# Patient Record
Sex: FEMALE | Race: WHITE | Employment: OTHER | URBAN - METROPOLITAN AREA
[De-identification: names, ages, dates, MRNs, and addresses within clinical notes are randomized per-mention and may not be internally consistent; named-entity substitution may affect disease eponyms.]

---

## 2020-09-13 ENCOUNTER — HOSPITAL ENCOUNTER (EMERGENCY)
Age: 70
Discharge: HOME | End: 2020-09-13
Attending: EMERGENCY MEDICINE
Payer: MEDICARE

## 2020-09-13 VITALS
HEART RATE: 79 BPM | WEIGHT: 115.3 LBS | HEIGHT: 69 IN | TEMPERATURE: 98.4 F | SYSTOLIC BLOOD PRESSURE: 126 MMHG | DIASTOLIC BLOOD PRESSURE: 92 MMHG | BODY MASS INDEX: 17.08 KG/M2 | OXYGEN SATURATION: 98 % | RESPIRATION RATE: 14 BRPM

## 2020-09-13 PROCEDURE — 96372 THER/PROPH/DIAG INJ SC/IM: CPT

## 2020-09-13 PROCEDURE — 6360000002 HC RX W HCPCS: Performed by: EMERGENCY MEDICINE

## 2020-09-13 PROCEDURE — 99282 EMERGENCY DEPT VISIT SF MDM: CPT

## 2020-09-13 RX ORDER — ASPIRIN 81 MG/1
81 TABLET, CHEWABLE ORAL DAILY
Status: ON HOLD | COMMUNITY
End: 2022-11-02 | Stop reason: SDUPTHER

## 2020-09-13 RX ORDER — SIMVASTATIN 40 MG
40 TABLET ORAL EVERY MORNING
Status: ON HOLD | COMMUNITY

## 2020-09-13 RX ORDER — METHYLPREDNISOLONE SODIUM SUCCINATE 125 MG/2ML
125 INJECTION, POWDER, LYOPHILIZED, FOR SOLUTION INTRAMUSCULAR; INTRAVENOUS ONCE
Status: COMPLETED | OUTPATIENT
Start: 2020-09-13 | End: 2020-09-13

## 2020-09-13 RX ORDER — ZOLPIDEM TARTRATE 5 MG/1
5 TABLET ORAL NIGHTLY PRN
COMMUNITY
End: 2022-10-31

## 2020-09-13 RX ORDER — LORAZEPAM 2 MG/1
2 TABLET ORAL EVERY 6 HOURS PRN
Status: ON HOLD | COMMUNITY

## 2020-09-13 RX ORDER — METHYLPREDNISOLONE 4 MG/1
TABLET ORAL
Qty: 1 KIT | Refills: 0 | Status: SHIPPED | OUTPATIENT
Start: 2020-09-13 | End: 2020-11-09

## 2020-09-13 RX ADMIN — METHYLPREDNISOLONE SODIUM SUCCINATE 125 MG: 125 INJECTION, POWDER, FOR SOLUTION INTRAMUSCULAR; INTRAVENOUS at 10:24

## 2020-09-13 SDOH — HEALTH STABILITY: MENTAL HEALTH: HOW OFTEN DO YOU HAVE A DRINK CONTAINING ALCOHOL?: NEVER

## 2020-09-13 ASSESSMENT — PAIN - FUNCTIONAL ASSESSMENT
PAIN_FUNCTIONAL_ASSESSMENT: PREVENTS OR INTERFERES WITH MANY ACTIVE NOT PASSIVE ACTIVITIES
PAIN_FUNCTIONAL_ASSESSMENT: 0-10

## 2020-09-13 ASSESSMENT — PAIN DESCRIPTION - PAIN TYPE: TYPE: CHRONIC PAIN

## 2020-09-13 ASSESSMENT — PAIN SCALES - GENERAL
PAINLEVEL_OUTOF10: 7
PAINLEVEL_OUTOF10: 8

## 2020-09-13 ASSESSMENT — PAIN DESCRIPTION - LOCATION: LOCATION: BACK

## 2020-09-13 ASSESSMENT — PAIN DESCRIPTION - FREQUENCY: FREQUENCY: CONTINUOUS

## 2020-09-13 ASSESSMENT — PAIN DESCRIPTION - ORIENTATION: ORIENTATION: LOWER;LEFT

## 2020-09-13 ASSESSMENT — PAIN DESCRIPTION - DESCRIPTORS: DESCRIPTORS: ACHING;CONSTANT

## 2020-09-13 NOTE — ED NOTES
Discharge and education instructions reviewed. Patient verbalized understanding, teach back successful. Patient denied questions at this time. Instructed to follow up with PCP and or return to ED if symptoms worsen.    Ambulatory to exit with a limp, daughter driving pain is subsiding 7/10     David Aggarwal RN  09/13/20 4386

## 2020-09-13 NOTE — ED PROVIDER NOTES
eMERGENCY dEPARTMENT eNCOUnter      Pt Name: Porfirio Jones  MRN: [id number]  [place] 1950  Date of evaluation: 9/13/2020  Provider: Mortimer Snow, MD     [address] [place] [address]       Chief Complaint   Patient presents with    Back Pain     h/o chronic back pain, with several discs surgeries. Now diana left low back pain radiating to left hip and leg, no known injury, denies urinary sx. onset last Friday         HISTORY OF PRESENT ILLNESS   (Location/Symptom, Timing/Onset,Context/Setting, Quality, Duration, Modifying Factors, Severity) Note limiting factors. HPI    Porfirio Jones is a 68 y.o. female who presents to the emergency department with low back pain for about 3 days. There was no injury. Patient states she has chronic back pain and multiple surgery for several discs. Patient states that pain is going down her back and down her legs. Denies any urinary complaints. It has been going on for 3 days getting worse. Patient requesting a steroid injection. Patient does not have a family doctor at this time. Has been no fever. Patient is ambulatory and drove herself here. Nursing Notes were reviewed. REVIEW OFSYSTEMS    (2+ for level 4; 10+ for level 5)   Review of Systems    General: No fevers, chills or night sweats, No weight loss    Head:  No Sore throat,  No Ear Pain    Chest:  Nontender. No Cough, No SOB,  Chest Pain    GI: No abdominal pain or vomiting    : No dysuria or hematuria    Musculoskeletal: No unrelenting pain or night pain    Neurologic: No bowel or bladder incontinence, No saddle anesthesia, No leg weakness    All other systems reviewed and are negative.         PAST MEDICAL HISTORY     Past Medical History:   Diagnosis Date    Arthritis     Hyperlipidemia        SURGICAL HISTORY       Past Surgical History:   Procedure Laterality Date    BACK SURGERY      x 5 disc surgeries    TUBAL LIGATION         CURRENT MEDICATIONS       Previous Medications    ASPIRIN 81 MG CHEWABLE TABLET    Take 81 mg by mouth daily    LORAZEPAM (ATIVAN) 2 MG TABLET    Take 2 mg by mouth every 6 hours as needed for Anxiety. SIMVASTATIN (ZOCOR) 40 MG TABLET    Take 40 mg by mouth nightly    ZOLPIDEM (AMBIEN) 5 MG TABLET    Take 5 mg by mouth nightly as needed for Sleep. ALLERGIES     Patient has no known allergies. FAMILY HISTORY     History reviewed. No pertinent family history. SOCIAL HISTORY       Social History     Socioeconomic History    Marital status:      Spouse name: None    Number of children: None    Years of education: None    Highest education level: None   Occupational History    None   Social Needs    Financial resource strain: None    Food insecurity     Worry: None     Inability: None    Transportation needs     Medical: None     Non-medical: None   Tobacco Use    Smoking status: Current Some Day Smoker     Packs/day: 0.50     Types: Cigarettes    Smokeless tobacco: Never Used   Substance and Sexual Activity    Alcohol use: Never     Frequency: Never    Drug use: Never    Sexual activity: None   Lifestyle    Physical activity     Days per week: None     Minutes per session: None    Stress: None   Relationships    Social connections     Talks on phone: None     Gets together: None     Attends Confucianism service: None     Active member of club or organization: None     Attends meetings of clubs or organizations: None     Relationship status: None    Intimate partner violence     Fear of current or ex partner: None     Emotionally abused: None     Physically abused: None     Forced sexual activity: None   Other Topics Concern    None   Social History Narrative    None       SCREENINGS           PHYSICAL EXAM    (up to 7 for level 4, 8 or more for level 5)     ED Triage Vitals   BP Temp Temp src Pulse Resp SpO2 Height Weight   -- -- -- -- -- -- -- --       Physical Exam    General: Alert and awake ×3. Nontoxic appearance.   Well-developed well-nourished elderly 68-year-old some kyphosis in no acute distress  HEENT: Normocephalic atraumatic. Neck is supple. Airway intact. No adenopathy  Cardiac: Regular rate and rhythm with no murmurs rubs or gallops  Pulmonary: Lungs are clear in all lung fields. No wheezing. No Rales. Abdomen: Soft and nontender. Negative hepatosplenomegaly. Bowel sounds are active  Extremities: Moving all extremities. No calf tenderness. Peripheral pulses all intact. No back tenderness. Midline surgical scar noted. .  Lumbar tenderness. Skin: No skin lesions. No rashes  Neurologic: Cranial nerves II through XII was grossly intact. Nonfocal neurological exam  Psychiatric: Patient is pleasant. Mood is appropriate. DIAGNOSTIC RESULTS     EKG (Per Emergency Physician):       RADIOLOGY (Per Emergency Physician): Interpretation per the Radiologist below, if available at the time of this note:  No results found. ED BEDSIDE ULTRASOUND:   Performed by ED Physician - none    LABS:  Labs Reviewed - No data to display     All other labs were within normal range or not returned as of this dictation. Procedures      EMERGENCY DEPARTMENT COURSE and DIFFERENTIAL DIAGNOSIS/MDM:   Vitals:    Vitals:    09/13/20 1003   BP: (!) 126/92   Pulse: 79   Resp: 14   Temp: 98.4 °F (36.9 °C)   TempSrc: Oral   SpO2: 98%   Weight: 115 lb 4.8 oz (52.3 kg)   Height: 5' 9\" (1.753 m)       Medications   methylPREDNISolone sodium (SOLU-MEDROL) injection 125 mg (has no administration in time range)       MDM. Patient was given Solu-Medrol 125 mg IM. Recommend Medrol Dosepak at home. Referral to a family physician at [facility]. Patient recommend bed rest continue her other medication discharged in good condition. REVAL:         CRITICAL CARE TIME   Total CriticalCare time was 0 minutes, excluding separately reportable procedures.   There was a high probability of clinically significant/life threatening deterioration in the patient's condition which required my urgent intervention. CONSULTS:  None    PROCEDURES:  Unless otherwise noted below, none     [unfilled]    FINAL IMPRESSION      1. Acute exacerbation of chronic low back pain          DISPOSITION/PLAN   DISPOSITION        PATIENT REFERRED TO:  [facility]) Pre-Services  [phone]          DISCHARGE MEDICATIONS:  New Prescriptions    METHYLPREDNISOLONE (MEDROL, MARLENI,) 4 MG TABLET    Take by mouth. (Please note:  Portions of this note were completed with a voice recognition program.Efforts were made to edit the dictations but occasionally words and phrases are mis-transcribed.)  Form v2016. J.5-cn    EMILY HAM MD (electronically signed)  Emergency Medicine Provider        Isacc Mesa MD  09/13/20 1126

## 2020-11-09 ENCOUNTER — HOSPITAL ENCOUNTER (EMERGENCY)
Age: 70
Discharge: HOME | End: 2020-11-09
Attending: EMERGENCY MEDICINE
Payer: MEDICARE

## 2020-11-09 VITALS
OXYGEN SATURATION: 98 % | HEART RATE: 75 BPM | TEMPERATURE: 97.7 F | SYSTOLIC BLOOD PRESSURE: 139 MMHG | HEIGHT: 70 IN | DIASTOLIC BLOOD PRESSURE: 97 MMHG | WEIGHT: 117.5 LBS | BODY MASS INDEX: 16.82 KG/M2 | RESPIRATION RATE: 16 BRPM

## 2020-11-09 PROCEDURE — 99283 EMERGENCY DEPT VISIT LOW MDM: CPT

## 2020-11-09 RX ORDER — METHOCARBAMOL 500 MG/1
500 TABLET, FILM COATED ORAL 2 TIMES DAILY
Qty: 40 TABLET | Refills: 0 | Status: SHIPPED | OUTPATIENT
Start: 2020-11-09 | End: 2020-11-19

## 2020-11-09 ASSESSMENT — PAIN SCALES - GENERAL: PAINLEVEL_OUTOF10: 0

## 2020-11-09 NOTE — ED PROVIDER NOTES
CHIEF COMPLAINT  Chief Complaint   Patient presents with    Medication Refill     pt. here for medication refills on Lorazepam 2mg every 6 hours, Ambien 5mg at night, Robaxin 500mg BID       HISTORY OF PRESENT ILLNESS  Zahra Vasquez is a 65 y.o. female who presents to the ED complaining of having her primary care physician retired and not having anyone to refill her chronic medications of Ativan, Ambien and Robaxin. Patient is not experiencing any current withdrawal symptoms but has complaints of chronic low back pain and restless leg syndrome. She is not having any symptoms of acute exacerbation. No other complaints, modifying factors or associated symptoms. Nursing notes reviewed. Past Medical History:   Diagnosis Date    Arthritis     Chronic back pain     Hyperlipidemia     Restless leg syndrome      Past Surgical History:   Procedure Laterality Date    BACK SURGERY      x 5 disc surgeries    TUBAL LIGATION       History reviewed. No pertinent family history.   Social History     Socioeconomic History    Marital status:      Spouse name: Not on file    Number of children: Not on file    Years of education: Not on file    Highest education level: Not on file   Occupational History    Not on file   Social Needs    Financial resource strain: Not on file    Food insecurity     Worry: Not on file     Inability: Not on file    Transportation needs     Medical: Not on file     Non-medical: Not on file   Tobacco Use    Smoking status: Current Some Day Smoker     Packs/day: 0.50     Types: Cigarettes    Smokeless tobacco: Never Used   Substance and Sexual Activity    Alcohol use: Never     Frequency: Never    Drug use: Never    Sexual activity: Not Currently   Lifestyle    Physical activity     Days per week: Not on file     Minutes per session: Not on file    Stress: Not on file   Relationships    Social connections     Talks on phone: Not on file     Gets together: Not on file     Attends Pentecostalism service: Not on file     Active member of club or organization: Not on file     Attends meetings of clubs or organizations: Not on file     Relationship status: Not on file    Intimate partner violence     Fear of current or ex partner: Not on file     Emotionally abused: Not on file     Physically abused: Not on file     Forced sexual activity: Not on file   Other Topics Concern    Not on file   Social History Narrative    Not on file     No current facility-administered medications for this encounter. Current Outpatient Medications   Medication Sig Dispense Refill    methocarbamol (ROBAXIN) 500 MG tablet Take 1 tablet by mouth 2 times daily for 10 days Use as needed for muscle pain or soreness. May take 2 at bedtime to aid sleep. 40 tablet 0    simvastatin (ZOCOR) 40 MG tablet Take 40 mg by mouth nightly      aspirin 81 MG chewable tablet Take 81 mg by mouth daily      LORazepam (ATIVAN) 2 MG tablet Take 2 mg by mouth every 6 hours as needed for Anxiety.  zolpidem (AMBIEN) 5 MG tablet Take 5 mg by mouth nightly as needed for Sleep. No Known Allergies    REVIEW OF SYSTEMS  Positives and pertinent negatives as per HPI. Six other systems were reviewed and are negative. Nursing notes pertaining to ROS were reviewed. PHYSICAL EXAM   BP (!) 139/97   Pulse 75   Temp 97.7 °F (36.5 °C) (Oral)   Resp 16   Ht 5' 10\" (1.778 m)   Wt 117 lb 8.1 oz (53.3 kg)   SpO2 98%   BMI 16.86 kg/m²   GENERAL APPEARANCE: Awake and alert. Cooperative. No acute distress. HEAD: Normocephalic. Atraumatic. EYES: PERRL. EOM's grossly intact. No scleral icterus, injection or exudate  ENT: Mucous membranes are moist.  NECK: Supple. Normal ROM. CHEST:  Regular rate and rhythm, no murmurs, rubs or gallops  LUNGS: Breathing is unlabored. Speaking comfortably in full sentences. Clear through auscultation bilaterally  ABDOMEN: Nondistended, nontender  EXTREMITIES: MAEE.  No acute deformities. SKIN: Warm and dry. NEUROLOGICAL: Alert and oriented. ED COURSE/MDM  Encounter for medication refill: Patient was counseled that her Ativan and Ambien could not be refilled as they are controlled substances that need to be managed in supervised by a single primary care physician. Patient is in the process of establishing with Dr. Mary Lorenzo but has not seen her yet and patient will be referred to our urgent PCP referral center. Patient will have her Robaxin refilled at this time. Patient is not currently having any symptoms of acute exacerbation of symptoms or withdrawal.    Hypertension:  Patient was hypertensive during the ER visit today. There are no signs of hypertensive emergency or evidence of end organ damage by history or physical exam.  These findings were discussed with the patient and advised to follow up with primary care physician to further assess and treat hypertension in the outpatient setting. The patient's blood pressure was found to be elevated according to CMS/Medicare and the Affordable Care Act/ObamaCare criteria. Elevated blood pressure could occur because of pain or anxiety or other reasons and does not mean that they need to have their blood pressure treated or medications otherwise adjusted. However, this could also be a sign that they will need to have their blood pressure treated or medications changed. The patient was instructed to take a list of recent blood pressure readings to their next visit with their personal physician. Patient was given scripts for the following medications. I counseled patient how to take these medications. New Prescriptions    METHOCARBAMOL (ROBAXIN) 500 MG TABLET    Take 1 tablet by mouth 2 times daily for 10 days Use as needed for muscle pain or soreness. May take 2 at bedtime to aid sleep. CLINICAL IMPRESSION  1.  Encounter for medication refill        Blood pressure (!) 139/97, pulse 75, temperature 97.7 °F (36.5 °C), temperature source Oral, resp. rate 16, height 5' 10\" (1.778 m), weight 117 lb 8.1 oz (53.3 kg), SpO2 98 %.       Follow-up with:  [facility]              Ivan Montoya MD  11/09/20 9136

## 2021-08-17 ENCOUNTER — HOSPITAL ENCOUNTER (OUTPATIENT)
Age: 71
Discharge: HOME | End: 2021-08-17
Payer: MEDICARE

## 2021-08-17 ENCOUNTER — HOSPITAL ENCOUNTER (OUTPATIENT)
Dept: GENERAL RADIOLOGY | Age: 71
Discharge: HOME | End: 2021-08-17
Payer: MEDICARE

## 2021-08-17 DIAGNOSIS — R03.0 ELEVATED BLOOD PRESSURE READING WITHOUT DIAGNOSIS OF HYPERTENSION: ICD-10-CM

## 2021-08-17 DIAGNOSIS — Z87.891 PERSONAL HISTORY OF NICOTINE DEPENDENCE: ICD-10-CM

## 2021-08-17 PROCEDURE — 71046 X-RAY EXAM CHEST 2 VIEWS: CPT

## 2022-10-31 ENCOUNTER — APPOINTMENT (OUTPATIENT)
Dept: GENERAL RADIOLOGY | Age: 72
DRG: 522 | End: 2022-10-31
Payer: MEDICARE

## 2022-10-31 ENCOUNTER — APPOINTMENT (OUTPATIENT)
Dept: CT IMAGING | Age: 72
DRG: 522 | End: 2022-10-31
Payer: MEDICARE

## 2022-10-31 ENCOUNTER — HOSPITAL ENCOUNTER (INPATIENT)
Age: 72
LOS: 2 days | Discharge: NURSING, REHAB OR LONG-TERM CARE | DRG: 522 | End: 2022-11-02
Attending: INTERNAL MEDICINE | Admitting: INTERNAL MEDICINE
Payer: MEDICARE

## 2022-10-31 DIAGNOSIS — S72.001A CLOSED RIGHT HIP FRACTURE, INITIAL ENCOUNTER (HCC): ICD-10-CM
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
## 2022-10-31 DIAGNOSIS — S72.001A CLOSED FRACTURE OF RIGHT HIP, INITIAL ENCOUNTER (HCC): Primary | ICD-10-CM

## 2022-10-31 LAB
A/G RATIO: 1.5 (ref 1.1–2.2)
ABO/RH: NORMAL
ALBUMIN SERPL-MCNC: 3.9 G/DL (ref 3.4–5)
ALP BLD-CCNC: 114 U/L (ref 40–129)
ALT SERPL-CCNC: 21 U/L (ref 10–40)
ANION GAP SERPL CALCULATED.3IONS-SCNC: 12 MMOL/L (ref 3–16)
ANTIBODY SCREEN: NORMAL
AST SERPL-CCNC: 40 U/L (ref 15–37)
BASOPHILS ABSOLUTE: 0 K/UL (ref 0–0.2)
BASOPHILS RELATIVE PERCENT: 0.3 %
BILIRUB SERPL-MCNC: 0.9 MG/DL (ref 0–1)
BUN BLDV-MCNC: 13 MG/DL (ref 7–20)
CALCIUM SERPL-MCNC: 8.8 MG/DL (ref 8.3–10.6)
CHLORIDE BLD-SCNC: 100 MMOL/L (ref 99–110)
CO2: 28 MMOL/L (ref 21–32)
CREAT SERPL-MCNC: 0.5 MG/DL (ref 0.6–1.2)
EOSINOPHILS ABSOLUTE: 0 K/UL (ref 0–0.6)
EOSINOPHILS RELATIVE PERCENT: 0 %
GFR SERPL CREATININE-BSD FRML MDRD: >60 ML/MIN/{1.73_M2}
GLUCOSE BLD-MCNC: 139 MG/DL (ref 70–99)
HCT VFR BLD CALC: 40.5 % (ref 36–48)
HEMOGLOBIN: 13.5 G/DL (ref 12–16)
INR BLD: 1.12 (ref 0.87–1.14)
LYMPHOCYTES ABSOLUTE: 0.5 K/UL (ref 1–5.1)
LYMPHOCYTES RELATIVE PERCENT: 4.7 %
MCH RBC QN AUTO: 32.2 PG (ref 26–34)
MCHC RBC AUTO-ENTMCNC: 33.3 G/DL (ref 31–36)
MCV RBC AUTO: 96.8 FL (ref 80–100)
MONOCYTES ABSOLUTE: 0.9 K/UL (ref 0–1.3)
MONOCYTES RELATIVE PERCENT: 7.9 %
NEUTROPHILS ABSOLUTE: 10 K/UL (ref 1.7–7.7)
NEUTROPHILS RELATIVE PERCENT: 87.1 %
PDW BLD-RTO: 13.6 % (ref 12.4–15.4)
PLATELET # BLD: 189 K/UL (ref 135–450)
PMV BLD AUTO: 9 FL (ref 5–10.5)
POTASSIUM SERPL-SCNC: 3.9 MMOL/L (ref 3.5–5.1)
PROTHROMBIN TIME: 14.3 SEC (ref 11.7–14.5)
RBC # BLD: 4.19 M/UL (ref 4–5.2)
SODIUM BLD-SCNC: 140 MMOL/L (ref 136–145)
TOTAL PROTEIN: 6.5 G/DL (ref 6.4–8.2)
WBC # BLD: 11.5 K/UL (ref 4–11)

## 2022-10-31 PROCEDURE — 86901 BLOOD TYPING SEROLOGIC RH(D): CPT

## 2022-10-31 PROCEDURE — 96375 TX/PRO/DX INJ NEW DRUG ADDON: CPT

## 2022-10-31 PROCEDURE — 86900 BLOOD TYPING SEROLOGIC ABO: CPT

## 2022-10-31 PROCEDURE — 96374 THER/PROPH/DIAG INJ IV PUSH: CPT

## 2022-10-31 PROCEDURE — 85025 COMPLETE CBC W/AUTO DIFF WBC: CPT

## 2022-10-31 PROCEDURE — 51702 INSERT TEMP BLADDER CATH: CPT

## 2022-10-31 PROCEDURE — 86850 RBC ANTIBODY SCREEN: CPT

## 2022-10-31 PROCEDURE — 73502 X-RAY EXAM HIP UNI 2-3 VIEWS: CPT

## 2022-10-31 PROCEDURE — 6360000002 HC RX W HCPCS: Performed by: PHYSICIAN ASSISTANT

## 2022-10-31 PROCEDURE — 2580000003 HC RX 258: Performed by: INTERNAL MEDICINE

## 2022-10-31 PROCEDURE — 80053 COMPREHEN METABOLIC PANEL: CPT

## 2022-10-31 PROCEDURE — 99285 EMERGENCY DEPT VISIT HI MDM: CPT

## 2022-10-31 PROCEDURE — 71045 X-RAY EXAM CHEST 1 VIEW: CPT

## 2022-10-31 PROCEDURE — 6360000002 HC RX W HCPCS: Performed by: INTERNAL MEDICINE

## 2022-10-31 PROCEDURE — 70450 CT HEAD/BRAIN W/O DYE: CPT

## 2022-10-31 PROCEDURE — 6370000000 HC RX 637 (ALT 250 FOR IP): Performed by: INTERNAL MEDICINE

## 2022-10-31 PROCEDURE — 99221 1ST HOSP IP/OBS SF/LOW 40: CPT | Performed by: NURSE PRACTITIONER

## 2022-10-31 PROCEDURE — 1200000000 HC SEMI PRIVATE

## 2022-10-31 PROCEDURE — 85610 PROTHROMBIN TIME: CPT

## 2022-10-31 RX ORDER — ATORVASTATIN CALCIUM 10 MG/1
10 TABLET, FILM COATED ORAL DAILY
Status: DISCONTINUED | OUTPATIENT
Start: 2022-11-01 | End: 2022-11-02 | Stop reason: HOSPADM

## 2022-10-31 RX ORDER — LORAZEPAM 1 MG/1
2 TABLET ORAL EVERY 6 HOURS PRN
Status: DISCONTINUED | OUTPATIENT
Start: 2022-10-31 | End: 2022-11-02 | Stop reason: HOSPADM

## 2022-10-31 RX ORDER — SODIUM CHLORIDE 9 MG/ML
INJECTION, SOLUTION INTRAVENOUS PRN
Status: DISCONTINUED | OUTPATIENT
Start: 2022-10-31 | End: 2022-11-02 | Stop reason: HOSPADM

## 2022-10-31 RX ORDER — POLYETHYLENE GLYCOL 3350 17 G/17G
17 POWDER, FOR SOLUTION ORAL DAILY PRN
Status: DISCONTINUED | OUTPATIENT
Start: 2022-10-31 | End: 2022-11-02 | Stop reason: HOSPADM

## 2022-10-31 RX ORDER — ONDANSETRON 4 MG/1
4 TABLET, ORALLY DISINTEGRATING ORAL EVERY 8 HOURS PRN
Status: DISCONTINUED | OUTPATIENT
Start: 2022-10-31 | End: 2022-11-02 | Stop reason: HOSPADM

## 2022-10-31 RX ORDER — ENOXAPARIN SODIUM 100 MG/ML
40 INJECTION SUBCUTANEOUS DAILY
Status: DISCONTINUED | OUTPATIENT
Start: 2022-11-01 | End: 2022-11-01

## 2022-10-31 RX ORDER — SODIUM CHLORIDE 0.9 % (FLUSH) 0.9 %
5-40 SYRINGE (ML) INJECTION PRN
Status: DISCONTINUED | OUTPATIENT
Start: 2022-10-31 | End: 2022-11-02 | Stop reason: HOSPADM

## 2022-10-31 RX ORDER — ONDANSETRON 2 MG/ML
4 INJECTION INTRAMUSCULAR; INTRAVENOUS EVERY 6 HOURS PRN
Status: DISCONTINUED | OUTPATIENT
Start: 2022-10-31 | End: 2022-11-02 | Stop reason: HOSPADM

## 2022-10-31 RX ORDER — SODIUM CHLORIDE 0.9 % (FLUSH) 0.9 %
5-40 SYRINGE (ML) INJECTION EVERY 12 HOURS SCHEDULED
Status: DISCONTINUED | OUTPATIENT
Start: 2022-10-31 | End: 2022-11-02 | Stop reason: HOSPADM

## 2022-10-31 RX ORDER — ASPIRIN 81 MG/1
81 TABLET, CHEWABLE ORAL DAILY
Status: DISCONTINUED | OUTPATIENT
Start: 2022-11-01 | End: 2022-11-02 | Stop reason: DRUGHIGH

## 2022-10-31 RX ORDER — SODIUM CHLORIDE, SODIUM LACTATE, POTASSIUM CHLORIDE, CALCIUM CHLORIDE 600; 310; 30; 20 MG/100ML; MG/100ML; MG/100ML; MG/100ML
INJECTION, SOLUTION INTRAVENOUS CONTINUOUS
Status: DISCONTINUED | OUTPATIENT
Start: 2022-10-31 | End: 2022-11-02 | Stop reason: HOSPADM

## 2022-10-31 RX ORDER — MORPHINE SULFATE 4 MG/ML
4 INJECTION, SOLUTION INTRAMUSCULAR; INTRAVENOUS ONCE
Status: COMPLETED | OUTPATIENT
Start: 2022-10-31 | End: 2022-10-31

## 2022-10-31 RX ORDER — ONDANSETRON 2 MG/ML
4 INJECTION INTRAMUSCULAR; INTRAVENOUS ONCE
Status: COMPLETED | OUTPATIENT
Start: 2022-10-31 | End: 2022-10-31

## 2022-10-31 RX ORDER — METHOCARBAMOL 500 MG/1
500 TABLET, FILM COATED ORAL 2 TIMES DAILY
Status: ON HOLD | COMMUNITY
End: 2022-11-08 | Stop reason: HOSPADM

## 2022-10-31 RX ADMIN — SODIUM CHLORIDE, POTASSIUM CHLORIDE, SODIUM LACTATE AND CALCIUM CHLORIDE: 600; 310; 30; 20 INJECTION, SOLUTION INTRAVENOUS at 21:45

## 2022-10-31 RX ADMIN — HYDROMORPHONE HYDROCHLORIDE 0.25 MG: 1 INJECTION, SOLUTION INTRAMUSCULAR; INTRAVENOUS; SUBCUTANEOUS at 14:21

## 2022-10-31 RX ADMIN — LORAZEPAM 2 MG: 1 TABLET ORAL at 18:53

## 2022-10-31 RX ADMIN — ONDANSETRON 4 MG: 2 INJECTION INTRAMUSCULAR; INTRAVENOUS at 13:02

## 2022-10-31 RX ADMIN — HYDROMORPHONE HYDROCHLORIDE 0.5 MG: 1 INJECTION, SOLUTION INTRAMUSCULAR; INTRAVENOUS; SUBCUTANEOUS at 17:01

## 2022-10-31 RX ADMIN — HYDROMORPHONE HYDROCHLORIDE 0.5 MG: 1 INJECTION, SOLUTION INTRAMUSCULAR; INTRAVENOUS; SUBCUTANEOUS at 21:40

## 2022-10-31 RX ADMIN — SODIUM CHLORIDE, PRESERVATIVE FREE 10 ML: 5 INJECTION INTRAVENOUS at 21:45

## 2022-10-31 RX ADMIN — MORPHINE SULFATE 4 MG: 4 INJECTION, SOLUTION INTRAMUSCULAR; INTRAVENOUS at 13:02

## 2022-10-31 ASSESSMENT — PAIN - FUNCTIONAL ASSESSMENT
PAIN_FUNCTIONAL_ASSESSMENT: PREVENTS OR INTERFERES WITH ALL ACTIVE AND SOME PASSIVE ACTIVITIES
PAIN_FUNCTIONAL_ASSESSMENT: 0-10
PAIN_FUNCTIONAL_ASSESSMENT: PREVENTS OR INTERFERES WITH ALL ACTIVE AND SOME PASSIVE ACTIVITIES

## 2022-10-31 ASSESSMENT — PAIN SCALES - GENERAL
PAINLEVEL_OUTOF10: 7
PAINLEVEL_OUTOF10: 7
PAINLEVEL_OUTOF10: 10
PAINLEVEL_OUTOF10: 7
PAINLEVEL_OUTOF10: 6

## 2022-10-31 ASSESSMENT — PAIN DESCRIPTION - ORIENTATION
ORIENTATION: RIGHT

## 2022-10-31 ASSESSMENT — PAIN DESCRIPTION - LOCATION
LOCATION: HIP

## 2022-10-31 ASSESSMENT — PAIN DESCRIPTION - DESCRIPTORS
DESCRIPTORS: ACHING
DESCRIPTORS: DISCOMFORT;ACHING

## 2022-10-31 ASSESSMENT — PAIN SCALES - WONG BAKER: WONGBAKER_NUMERICALRESPONSE: 2

## 2022-10-31 ASSESSMENT — ENCOUNTER SYMPTOMS
SHORTNESS OF BREATH: 0
VOMITING: 0
ABDOMINAL PAIN: 0

## 2022-10-31 ASSESSMENT — PAIN DESCRIPTION - PAIN TYPE
TYPE: ACUTE PAIN
TYPE: ACUTE PAIN

## 2022-10-31 NOTE — ED PROVIDER NOTES
[facility]      Pt Name: Sasha Rodríguez  MRN: [id number]  [place] 1950  Date of evaluation: 10/31/2022  Provider: RU Suarez    This patient was not seen and evaluated by the attending physician No att. providers found. CHIEF COMPLAINT       Chief Complaint   Patient presents with    Fall     Patient fell yesterday walking from bathroom. She admits to a few shots of vodka but denies being drunk. Family assisted patient to bed. Woke with continued right hip pain and unable to get up. Rotation is present. Received 100mcg of fentanyl and 4mg zofran both IV by ems. CRITICAL CARE TIME   I performed a total Critical Care time of 31 minutes, excluding separately reportable procedures. There was a high probability of clinically significant/life threatening deterioration in the patient's condition which required my urgent intervention. Not limited to multiple reexaminations, discussions with attending physician and consultants. HISTORY OF PRESENT ILLNESS  (Location/Symptom, Timing/Onset, Context/Setting, Quality, Duration, Modifying Factors, Severity.)   Sasha Rodríguez is a 74 y.o. female who presents to the emergency department after a fall. Patient presents via EMS from home. She tells me that she had some vodka drink last night but that she slipped in the bathroom and went down. Denies loss of consciousness or hitting her head. She had some right hip pain but her son-in-law \"picked her up and got her in bed. \"  She has not been able to get out of bed. She states she is urinated on herself and has not eaten anything since yesterday. Pain in hip is 7 out of 10 despite getting 100 MCG of fentanyl from EMS. She takes a baby aspirin no other blood thinners. History of chronic back pain hyperlipidemia and restless leg syndrome. She takes Ativan nightly. Nursing Notes were reviewed and I agree.     REVIEW OF SYSTEMS    (2-9 systems for level 4, 10 or more for level 5)     Review of Systems   Constitutional:  Negative for fever. Respiratory:  Negative for shortness of breath. Cardiovascular:  Negative for chest pain. Gastrointestinal:  Negative for abdominal pain and vomiting. Musculoskeletal:  Positive for arthralgias and gait problem. Skin:  Negative for wound. Neurological:  Negative for weakness and numbness. Psychiatric/Behavioral:  Negative for agitation, behavioral problems and confusion. Except as noted above the remainder of the review of systems was reviewed and negative. PAST MEDICAL HISTORY         Diagnosis Date    Arthritis     Chronic back pain     Hyperlipidemia     Restless leg syndrome        SURGICAL HISTORY           Procedure Laterality Date    BACK SURGERY      x 5 disc surgeries    TUBAL LIGATION         CURRENT MEDICATIONS       Previous Medications    ASPIRIN 81 MG CHEWABLE TABLET    Take 81 mg by mouth daily    LORAZEPAM (ATIVAN) 2 MG TABLET    Take 2 mg by mouth every 6 hours as needed for Anxiety. SIMVASTATIN (ZOCOR) 40 MG TABLET    Take 40 mg by mouth nightly    ZOLPIDEM (AMBIEN) 5 MG TABLET    Take 5 mg by mouth nightly as needed for Sleep. ALLERGIES     Patient has no known allergies. FAMILY HISTORY     History reviewed. No pertinent family history. No family status information on file. SOCIAL HISTORY      reports that she has been smoking cigarettes. She has been smoking an average of .5 packs per day. She has never used smokeless tobacco. She reports current alcohol use. She reports that she does not use drugs. PHYSICAL EXAM    (up to 7 for level 4, 8 or more for level 5)     ED Triage Vitals [10/31/22 1135]   BP Temp Temp Source Heart Rate Resp SpO2 Height Weight   (!) 150/88 98 °F (36.7 °C) Oral 88 16 94 % -- 138 lb 3.7 oz (62.7 kg)       Physical Exam  Vitals and nursing note reviewed.    Constitutional:       Appearance: Normal appearance. HENT:      Head: Normocephalic and atraumatic. Mouth/Throat:      Mouth: Mucous membranes are moist.   Eyes:      Pupils: Pupils are equal, round, and reactive to light. Cardiovascular:      Rate and Rhythm: Normal rate. Pulses: Normal pulses. Pulmonary:      Effort: Pulmonary effort is normal. No respiratory distress. Abdominal:      Tenderness: There is no abdominal tenderness. Musculoskeletal:      Cervical back: Normal range of motion. Right hip: Deformity and bony tenderness present. Decreased range of motion. Decreased strength. Skin:     General: Skin is warm. Neurological:      General: No focal deficit present. Mental Status: She is alert and oriented to person, place, and time. Psychiatric:         Mood and Affect: Mood normal.         Behavior: Behavior normal.       DIAGNOSTIC RESULTS     RADIOLOGY:   Non-plain film images such as CT, Ultrasound and MRI are read by the radiologist. Plain radiographic images are visualized and preliminarily interpreted by RU Garcia with the below findings:    Reviewed radiologist's interpretation. Interpretation per the Radiologist below, if available at the time of this note:    XR CHEST 1 VIEW   Final Result   No acute cardiopulmonary findings         XR HIP 2-3 VW W PELVIS RIGHT   Final Result   Angulated and displaced right femoral neck fracture         CT HEAD WO CONTRAST   Preliminary Result   No evidence of acute intracranial abnormality.                LABS:  Labs Reviewed   CBC WITH AUTO DIFFERENTIAL - Abnormal; Notable for the following components:       Result Value    WBC 11.5 (*)     Neutrophils Absolute 10.0 (*)     Lymphocytes Absolute 0.5 (*)     All other components within normal limits   COMPREHENSIVE METABOLIC PANEL - Abnormal; Notable for the following components:    Glucose 139 (*)     Creatinine 0.5 (*)     AST 40 (*)     All other components within normal limits   PROTIME-INR   URINALYSIS WITH REFLEX TO CULTURE   TYPE AND SCREEN       All other labs were within normal range or not returned as of this dictation. EMERGENCY DEPARTMENT COURSE and DIFFERENTIAL DIAGNOSIS/MDM:   Vitals:    Vitals:    10/31/22 1135   BP: (!) 150/88   Pulse: 88   Resp: 16   Temp: 98 °F (36.7 °C)   TempSrc: Oral   SpO2: 94%   Weight: 138 lb 3.7 oz (62.7 kg)     Afebrile not tachycardic not hypoxic. Has a right hip fracture. Orthopedics reviewed the case. Recommended surgery tomorrow afternoon. Pulses are intact in the foot. Evaluated by the hospitalist.  Will admit. CONSULTS:  [address] [place] [address] CONSULT TO HOSPITALIST    PROCEDURES:  Procedures      FINAL IMPRESSION      1. Closed fracture of right hip, initial encounter ([facility] [address]    2. Fall from slip, trip, or stumble, initial encounter          DISPOSITION/PLAN   DISPOSITION Decision To Admit 10/31/2022 01:10:14 PM      PATIENT REFERRED TO:  No follow-up provider specified.     DISCHARGE MEDICATIONS:  New Prescriptions    No medications on file       (Please note that portions of this note were completed with a voice recognition program.  Efforts were made to edit the dictations but occasionally words are mis-transcribed.)    Lucrecia Obando [place]  10/31/22 1125

## 2022-10-31 NOTE — PROGRESS NOTES
Medication Reconciliation    List of medications patient is currently taking is complete. Source of information: 1. Conversation with patient                                       2. EPIC records                                       3. Conversation with Tae Michael     Allergies  Patient has no known allergies.      Urszula Donohue [facility], PharmD, BCPS  10/31/2022 2:01 PM

## 2022-10-31 NOTE — H&P
smoking cigarettes. She has been smoking an average of .5 packs per day. She has never used smokeless tobacco.  ETOH:   reports current alcohol use. E-cigarette/Vaping       Questions Responses    E-cigarette/Vaping Use     Start Date     Passive Exposure     Quit Date     Counseling Given     Comments               Family History:       Reviewed and negative in regards to presenting illness/complaint. History reviewed. No pertinent family history. REVIEW OF SYSTEMS COMPLETED:   Pertinent positives as noted in the HPI. All other systems reviewed and negative. PHYSICAL EXAM PERFORMED:    BP (!) 150/88   Pulse 88   Temp 98 °F (36.7 °C) (Oral)   Resp 16   Wt 138 lb 3.7 oz (62.7 kg)   SpO2 94%   BMI 19.83 kg/m²     General appearance:  No apparent distress, appears stated age and cooperative. HEENT:  Normal cephalic, atraumatic without obvious deformity. Pupils equal, round, and reactive to light. Extra ocular muscles intact. Conjunctivae/corneas clear. Neck: Supple, with full range of motion. No jugular venous distention. Trachea midline. Respiratory:  Normal respiratory effort. Clear to auscultation, bilaterally without Rales/Wheezes/Rhonchi. Cardiovascular:  Regular rate and rhythm with normal S1/S2 without murmurs, rubs or gallops. Abdomen: Soft, non-tender, non-distended with normal bowel sounds. Musculoskeletal:  No clubbing, cyanosis or edema bilaterally. Full range of motion without deformity. Skin: Skin color, texture, turgor normal.  No rashes or lesions. Neurologic:  Neurovascularly intact without any focal sensory/motor deficits.  Cranial nerves: II-XII intact, grossly non-focal.  Psychiatric:  Alert and oriented, thought content appropriate, normal insight  Capillary Refill: Brisk,3 seconds, normal  Peripheral Pulses: +2 palpable, equal bilaterally       Labs:     Recent Labs     10/31/22  1202   WBC 11.5*   HGB 13.5   HCT 40.5        Recent Labs     10/31/22  1202    K 3.9      CO2 28   BUN 13   CREATININE 0.5*   CALCIUM 8.8     Recent Labs     10/31/22  1202   AST 40*   ALT 21   BILITOT 0.9   ALKPHOS 114     Recent Labs     10/31/22  1202   INR 1.12     No results for input(s): Carlee Del Rio in the last 72 hours. Urinalysis:    No results found for: Vicki Passer, [address], BACTERIA, RBCUA, BLOODU, [address], [place] [address]    Radiology:         XR CHEST 1 VIEW   Final Result   No acute cardiopulmonary findings         XR HIP 2-3 VW W PELVIS RIGHT   Final Result   Angulated and displaced right femoral neck fracture         CT HEAD WO CONTRAST   Preliminary Result   No evidence of acute intracranial abnormality. Consults:    IP CONSULT TO ORTHOPEDIC SURGERY  IP CONSULT TO HOSPITALIST    ASSESSMENT:    Active Hospital Problems    Diagnosis Date Noted    Closed fracture of right hip ([facility] [address] [S72.001A] 10/31/2022     Priority: Medium    Closed right hip fracture, initial encounter ([facility] [address] [S72.001A] 10/31/2022     Priority: Medium         PLAN:    Angulated right neck femoral fracture  Okay to proceed with surgery; revised cardiac index 1, class II  N.p.o. after midnight  Dilaudid as needed  Orthopedic surgery notified from ED, plan for surgery tomorrow in a.m. Back pain  Chronic benzodiazepine use  Patient takes lorazepam 2 mg every 6 hours, which is concerning due to high incidence of falls and adverse reactions     DVT Prophylaxis: +  Diet: ADULT DIET; Regular  Code Status: No Order    PT/OT Eval Status: +    Dispo - admit       Mitra Sloan MD    Thank you Brady Green MD for the opportunity to be involved in this patient's care. If you have any questions or concerns please feel free to contact me at [phone].

## 2022-10-31 NOTE — ED NOTES
Patient cleaned from urine and stool. purewick placed until urinary catheter can be inserted.       Shay Maloney RN  10/31/22 [id number]

## 2022-10-31 NOTE — CONSULTS
[facility] Orthopedic Surgery  Consult Note    This patient is seen in consultation at the request of A Chantale VENCES    Reason for Consult:  right hip pain    CHIEF COMPLAINT:  right hip pain    History Obtained From:  patient, electronic medical record    HISTORY OF PRESENT ILLNESS:    The patient is a 74 y.o. female who presents with right hip pain after a fall in  her home yesterday. She allowed family to move her from the floor. Pain persisted and she is unable to walk so she came to ER today. Pain is described in right groin and with the intensity of moderate to severe. Pain is described as aching, throbbing. Discomfort is constant. Pain is worse with movement right leg and better at rest. She is alert and oriented. Appears in distress with complaint of pain and requesting pain medication. No other complaints. She states she had not eaten since yesterday    Past Medical History:        Diagnosis Date    Arthritis     Chronic back pain     Hyperlipidemia     Restless leg syndrome        Past Surgical History:        Procedure Laterality Date    BACK SURGERY      x 5 disc surgeries    TUBAL LIGATION         Social History     Tobacco Use    Smoking status: Every Day     Packs/day: 0.50     Types: Cigarettes    Smokeless tobacco: Never   Substance Use Topics    Alcohol use: Yes     Comment: occasional       History reviewed. No pertinent family history. Current Medications:   Current Facility-Administered Medications: morphine (PF) injection 4 mg, 4 mg, IntraVENous, Once  ondansetron (ZOFRAN) injection 4 mg, 4 mg, IntraVENous, Once  Allergies:  ALLERGY@    REVIEW OF SYSTEMS:    CONSTITUTIONAL:  negative for  fevers, chills, and fatigue  MUSCULOSKELETAL:  positive for  myalgias, arthralgias, and pain  All other ROS reviewed in chart or with patient or family and are grossly negative.          PHYSICAL EXAM:    VITALS:  BP (!) 150/88   Pulse 88   Temp 98 °F (36.7 °C) (Oral)   Resp 16   Wt 138 lb 3.7 oz (62.7 kg) SpO2 94%   BMI 19.83 kg/m²     MUSCULOSKELETAL:  right foot NVI. Wiggles toes to command. Able to plantarflex and dorsiflex ankle Pedal pulses are palpable. Right leg externally rotated. Tender right groin. Nontender left groin or bilateral knee or ankle. Nontender bilateral shoulder elbow and wrist. Right wrist and hand with gross bruising, no swelling. Able to flex and extend wrist and hand without pain or limitation. Small bruise base of left thumb, FROM intact without pain or limitations. NEUROLOGIC:   Sensory:    Touch:                     Right Upper Extremity:  normal                   Left Upper Extremity:  normal                  Right Lower Extremity:  normal                  Left Lower Extremity:  normal  Skin warm and dry  Resp deep and easy  Abdomen soft and round  Pulse is with regular rate and rhythm    DATA:    CBC:   Lab Results   Component Value Date/Time    WBC 11.5 10/31/2022 12:02 PM    RBC 4.19 10/31/2022 12:02 PM    HGB 13.5 10/31/2022 12:02 PM    HCT 40.5 10/31/2022 12:02 PM    MCV 96.8 10/31/2022 12:02 PM    MCH 32.2 10/31/2022 12:02 PM    MCHC 33.3 10/31/2022 12:02 PM    RDW 13.6 10/31/2022 12:02 PM     10/31/2022 12:02 PM    MPV 9.0 10/31/2022 12:02 PM     WBC:    Lab Results   Component Value Date/Time    WBC 11.5 10/31/2022 12:02 PM     PT/INR:    Lab Results   Component Value Date/Time    PROTIME 14.3 10/31/2022 12:02 PM    INR 1.12 10/31/2022 12:02 PM     PTT:  No results found for: APTT[APTT  Radiology Review:    Xray pelvis pending      IMPRESSION/RECOMMENDATIONS:  Fall at home  Right hip pain  Right hip xray pending  Will discuss with Dr Gali Herrera. Pt has not eaten today, if surgery required may be able to address today. Keep NPO for now.        Marcia Cadena, JES - JONE  10/31/2022  12:42 PM

## 2022-10-31 NOTE — PROGRESS NOTES
Updated Dr Sree Ken and pt. Plan for right hip surgery tomorrow afternoon. NPO after MN. [place] traction 5# for comfort.

## 2022-11-01 ENCOUNTER — ANESTHESIA (OUTPATIENT)
Dept: OPERATING ROOM | Age: 72
DRG: 522 | End: 2022-11-01
Payer: MEDICARE

## 2022-11-01 ENCOUNTER — APPOINTMENT (OUTPATIENT)
Dept: GENERAL RADIOLOGY | Age: 72
DRG: 522 | End: 2022-11-01
Payer: MEDICARE

## 2022-11-01 ENCOUNTER — ANESTHESIA EVENT (OUTPATIENT)
Dept: OPERATING ROOM | Age: 72
DRG: 522 | End: 2022-11-01
Payer: MEDICARE

## 2022-11-01 LAB
ANION GAP SERPL CALCULATED.3IONS-SCNC: 13 MMOL/L (ref 3–16)
BASOPHILS ABSOLUTE: 0.1 K/UL (ref 0–0.2)
BASOPHILS RELATIVE PERCENT: 0.6 %
BUN BLDV-MCNC: 19 MG/DL (ref 7–20)
CALCIUM SERPL-MCNC: 8.9 MG/DL (ref 8.3–10.6)
CHLORIDE BLD-SCNC: 101 MMOL/L (ref 99–110)
CO2: 27 MMOL/L (ref 21–32)
CREAT SERPL-MCNC: 0.7 MG/DL (ref 0.6–1.2)
EOSINOPHILS ABSOLUTE: 0.1 K/UL (ref 0–0.6)
EOSINOPHILS RELATIVE PERCENT: 0.5 %
GFR SERPL CREATININE-BSD FRML MDRD: >60 ML/MIN/{1.73_M2}
GLUCOSE BLD-MCNC: 110 MG/DL (ref 70–99)
GLUCOSE BLD-MCNC: 116 MG/DL (ref 70–99)
HCT VFR BLD CALC: 38.8 % (ref 36–48)
HEMOGLOBIN: 13.2 G/DL (ref 12–16)
LYMPHOCYTES ABSOLUTE: 1.4 K/UL (ref 1–5.1)
LYMPHOCYTES RELATIVE PERCENT: 13.6 %
MCH RBC QN AUTO: 32.3 PG (ref 26–34)
MCHC RBC AUTO-ENTMCNC: 34 G/DL (ref 31–36)
MCV RBC AUTO: 95 FL (ref 80–100)
MONOCYTES ABSOLUTE: 0.9 K/UL (ref 0–1.3)
MONOCYTES RELATIVE PERCENT: 8.6 %
NEUTROPHILS ABSOLUTE: 7.9 K/UL (ref 1.7–7.7)
NEUTROPHILS RELATIVE PERCENT: 76.7 %
PDW BLD-RTO: 13.8 % (ref 12.4–15.4)
PERFORMED ON: ABNORMAL
PLATELET # BLD: 176 K/UL (ref 135–450)
PMV BLD AUTO: 9.7 FL (ref 5–10.5)
POTASSIUM REFLEX MAGNESIUM: 4 MMOL/L (ref 3.5–5.1)
RBC # BLD: 4.09 M/UL (ref 4–5.2)
SODIUM BLD-SCNC: 141 MMOL/L (ref 136–145)
WBC # BLD: 10.4 K/UL (ref 4–11)

## 2022-11-01 PROCEDURE — 2580000003 HC RX 258: Performed by: INTERNAL MEDICINE

## 2022-11-01 PROCEDURE — 7100000000 HC PACU RECOVERY - FIRST 15 MIN: Performed by: ORTHOPAEDIC SURGERY

## 2022-11-01 PROCEDURE — C1713 ANCHOR/SCREW BN/BN,TIS/BN: HCPCS | Performed by: ORTHOPAEDIC SURGERY

## 2022-11-01 PROCEDURE — 2580000003 HC RX 258: Performed by: ORTHOPAEDIC SURGERY

## 2022-11-01 PROCEDURE — 3600000015 HC SURGERY LEVEL 5 ADDTL 15MIN: Performed by: ORTHOPAEDIC SURGERY

## 2022-11-01 PROCEDURE — 2500000003 HC RX 250 WO HCPCS: Performed by: NURSE ANESTHETIST, CERTIFIED REGISTERED

## 2022-11-01 PROCEDURE — 7100000001 HC PACU RECOVERY - ADDTL 15 MIN: Performed by: ORTHOPAEDIC SURGERY

## 2022-11-01 PROCEDURE — 6360000002 HC RX W HCPCS: Performed by: ORTHOPAEDIC SURGERY

## 2022-11-01 PROCEDURE — 2580000003 HC RX 258: Performed by: NURSE PRACTITIONER

## 2022-11-01 PROCEDURE — 80048 BASIC METABOLIC PNL TOTAL CA: CPT

## 2022-11-01 PROCEDURE — 6370000000 HC RX 637 (ALT 250 FOR IP): Performed by: ORTHOPAEDIC SURGERY

## 2022-11-01 PROCEDURE — 2709999900 HC NON-CHARGEABLE SUPPLY: Performed by: ORTHOPAEDIC SURGERY

## 2022-11-01 PROCEDURE — 73502 X-RAY EXAM HIP UNI 2-3 VIEWS: CPT | Performed by: ORTHOPAEDIC SURGERY

## 2022-11-01 PROCEDURE — 2060000000 HC ICU INTERMEDIATE R&B

## 2022-11-01 PROCEDURE — 0SR90JZ REPLACEMENT OF RIGHT HIP JOINT WITH SYNTHETIC SUBSTITUTE, OPEN APPROACH: ICD-10-PCS | Performed by: ORTHOPAEDIC SURGERY

## 2022-11-01 PROCEDURE — 94760 N-INVAS EAR/PLS OXIMETRY 1: CPT

## 2022-11-01 PROCEDURE — 73502 X-RAY EXAM HIP UNI 2-3 VIEWS: CPT

## 2022-11-01 PROCEDURE — 85025 COMPLETE CBC W/AUTO DIFF WBC: CPT

## 2022-11-01 PROCEDURE — 3209999900 FLUORO FOR SURGICAL PROCEDURES

## 2022-11-01 PROCEDURE — 3600000005 HC SURGERY LEVEL 5 BASE: Performed by: ORTHOPAEDIC SURGERY

## 2022-11-01 PROCEDURE — C1776 JOINT DEVICE (IMPLANTABLE): HCPCS | Performed by: ORTHOPAEDIC SURGERY

## 2022-11-01 PROCEDURE — 6360000002 HC RX W HCPCS: Performed by: INTERNAL MEDICINE

## 2022-11-01 PROCEDURE — 3700000001 HC ADD 15 MINUTES (ANESTHESIA): Performed by: ORTHOPAEDIC SURGERY

## 2022-11-01 PROCEDURE — 36415 COLL VENOUS BLD VENIPUNCTURE: CPT

## 2022-11-01 PROCEDURE — 6360000002 HC RX W HCPCS: Performed by: NURSE ANESTHETIST, CERTIFIED REGISTERED

## 2022-11-01 PROCEDURE — A4217 STERILE WATER/SALINE, 500 ML: HCPCS | Performed by: ORTHOPAEDIC SURGERY

## 2022-11-01 PROCEDURE — 3700000000 HC ANESTHESIA ATTENDED CARE: Performed by: ORTHOPAEDIC SURGERY

## 2022-11-01 PROCEDURE — 27130 TOTAL HIP ARTHROPLASTY: CPT | Performed by: ORTHOPAEDIC SURGERY

## 2022-11-01 PROCEDURE — 2720000010 HC SURG SUPPLY STERILE: Performed by: ORTHOPAEDIC SURGERY

## 2022-11-01 PROCEDURE — 6360000002 HC RX W HCPCS: Performed by: HOSPITALIST

## 2022-11-01 PROCEDURE — 6360000002 HC RX W HCPCS: Performed by: NURSE PRACTITIONER

## 2022-11-01 DEVICE — BONE SCREW 6.5X40 SELF-TAP: Type: IMPLANTABLE DEVICE | Site: HIP | Status: FUNCTIONAL

## 2022-11-01 DEVICE — VIVACIT-E DM BEARING 28X40MM: Type: IMPLANTABLE DEVICE | Site: HIP | Status: FUNCTIONAL

## 2022-11-01 DEVICE — AVENIR COMPLETE STANDARD COLLARED SIZE 5: Type: IMPLANTABLE DEVICE | Site: HIP | Status: FUNCTIONAL

## 2022-11-01 DEVICE — BIOLOX® DELTA, CERAMIC FEMORAL HEAD, L, Ø 28/+3.5, TAPER 12/14
Type: IMPLANTABLE DEVICE | Site: HIP | Status: FUNCTIONAL
Brand: BIOLOX® DELTA

## 2022-11-01 DEVICE — BONE SCREW 6.5X35 SELF-TAP: Type: IMPLANTABLE DEVICE | Site: HIP | Status: FUNCTIONAL

## 2022-11-01 DEVICE — G7 DUAL MOBILITY LINER 40MM D: Type: IMPLANTABLE DEVICE | Site: HIP | Status: FUNCTIONAL

## 2022-11-01 DEVICE — SHELL ACET SZ D DIA50MM 3 H OSSEOTI LIMIT H 2 MOBILITY G7: Type: IMPLANTABLE DEVICE | Site: HIP | Status: FUNCTIONAL

## 2022-11-01 RX ORDER — ONDANSETRON 2 MG/ML
INJECTION INTRAMUSCULAR; INTRAVENOUS PRN
Status: DISCONTINUED | OUTPATIENT
Start: 2022-11-01 | End: 2022-11-01 | Stop reason: SDUPTHER

## 2022-11-01 RX ORDER — SODIUM CHLORIDE 0.9 % (FLUSH) 0.9 %
5-40 SYRINGE (ML) INJECTION EVERY 12 HOURS SCHEDULED
Status: DISCONTINUED | OUTPATIENT
Start: 2022-11-01 | End: 2022-11-01 | Stop reason: HOSPADM

## 2022-11-01 RX ORDER — DEXAMETHASONE SODIUM PHOSPHATE 4 MG/ML
INJECTION, SOLUTION INTRA-ARTICULAR; INTRALESIONAL; INTRAMUSCULAR; INTRAVENOUS; SOFT TISSUE PRN
Status: DISCONTINUED | OUTPATIENT
Start: 2022-11-01 | End: 2022-11-01 | Stop reason: SDUPTHER

## 2022-11-01 RX ORDER — PROPOFOL 10 MG/ML
INJECTION, EMULSION INTRAVENOUS PRN
Status: DISCONTINUED | OUTPATIENT
Start: 2022-11-01 | End: 2022-11-01 | Stop reason: SDUPTHER

## 2022-11-01 RX ORDER — FENTANYL CITRATE 50 UG/ML
INJECTION, SOLUTION INTRAMUSCULAR; INTRAVENOUS PRN
Status: DISCONTINUED | OUTPATIENT
Start: 2022-11-01 | End: 2022-11-01 | Stop reason: SDUPTHER

## 2022-11-01 RX ORDER — ONDANSETRON 2 MG/ML
4 INJECTION INTRAMUSCULAR; INTRAVENOUS
Status: DISCONTINUED | OUTPATIENT
Start: 2022-11-01 | End: 2022-11-01 | Stop reason: HOSPADM

## 2022-11-01 RX ORDER — SODIUM CHLORIDE 9 MG/ML
INJECTION, SOLUTION INTRAVENOUS PRN
Status: DISCONTINUED | OUTPATIENT
Start: 2022-11-01 | End: 2022-11-01 | Stop reason: HOSPADM

## 2022-11-01 RX ORDER — SODIUM CHLORIDE 0.9 % (FLUSH) 0.9 %
5-40 SYRINGE (ML) INJECTION PRN
Status: DISCONTINUED | OUTPATIENT
Start: 2022-11-01 | End: 2022-11-01 | Stop reason: HOSPADM

## 2022-11-01 RX ORDER — SUCCINYLCHOLINE/SOD CL,ISO/PF 200MG/10ML
SYRINGE (ML) INTRAVENOUS PRN
Status: DISCONTINUED | OUTPATIENT
Start: 2022-11-01 | End: 2022-11-01 | Stop reason: SDUPTHER

## 2022-11-01 RX ORDER — GLYCOPYRROLATE 0.2 MG/ML
INJECTION INTRAMUSCULAR; INTRAVENOUS PRN
Status: DISCONTINUED | OUTPATIENT
Start: 2022-11-01 | End: 2022-11-01 | Stop reason: SDUPTHER

## 2022-11-01 RX ORDER — MAGNESIUM HYDROXIDE 1200 MG/15ML
LIQUID ORAL CONTINUOUS PRN
Status: DISCONTINUED | OUTPATIENT
Start: 2022-11-01 | End: 2022-11-01 | Stop reason: HOSPADM

## 2022-11-01 RX ORDER — VANCOMYCIN HYDROCHLORIDE 1 G/20ML
INJECTION, POWDER, LYOPHILIZED, FOR SOLUTION INTRAVENOUS
Status: COMPLETED | OUTPATIENT
Start: 2022-11-01 | End: 2022-11-01

## 2022-11-01 RX ORDER — LIDOCAINE HYDROCHLORIDE 20 MG/ML
INJECTION, SOLUTION EPIDURAL; INFILTRATION; INTRACAUDAL; PERINEURAL PRN
Status: DISCONTINUED | OUTPATIENT
Start: 2022-11-01 | End: 2022-11-01 | Stop reason: SDUPTHER

## 2022-11-01 RX ORDER — ROCURONIUM BROMIDE 10 MG/ML
INJECTION, SOLUTION INTRAVENOUS PRN
Status: DISCONTINUED | OUTPATIENT
Start: 2022-11-01 | End: 2022-11-01 | Stop reason: SDUPTHER

## 2022-11-01 RX ORDER — FENTANYL CITRATE 50 UG/ML
25 INJECTION, SOLUTION INTRAMUSCULAR; INTRAVENOUS EVERY 5 MIN PRN
Status: DISCONTINUED | OUTPATIENT
Start: 2022-11-01 | End: 2022-11-01 | Stop reason: HOSPADM

## 2022-11-01 RX ORDER — EPHEDRINE SULFATE/0.9% NACL/PF 50 MG/5 ML
SYRINGE (ML) INTRAVENOUS PRN
Status: DISCONTINUED | OUTPATIENT
Start: 2022-11-01 | End: 2022-11-01 | Stop reason: SDUPTHER

## 2022-11-01 RX ORDER — PHENYLEPHRINE HCL IN 0.9% NACL 1 MG/10 ML
SYRINGE (ML) INTRAVENOUS PRN
Status: DISCONTINUED | OUTPATIENT
Start: 2022-11-01 | End: 2022-11-01 | Stop reason: SDUPTHER

## 2022-11-01 RX ORDER — OXYCODONE HYDROCHLORIDE 5 MG/1
5 TABLET ORAL
Status: DISCONTINUED | OUTPATIENT
Start: 2022-11-01 | End: 2022-11-01 | Stop reason: HOSPADM

## 2022-11-01 RX ORDER — MIDAZOLAM HYDROCHLORIDE 1 MG/ML
INJECTION INTRAMUSCULAR; INTRAVENOUS PRN
Status: DISCONTINUED | OUTPATIENT
Start: 2022-11-01 | End: 2022-11-01 | Stop reason: SDUPTHER

## 2022-11-01 RX ADMIN — HYDROMORPHONE HYDROCHLORIDE 0.25 MG: 1 INJECTION, SOLUTION INTRAMUSCULAR; INTRAVENOUS; SUBCUTANEOUS at 04:01

## 2022-11-01 RX ADMIN — CEFAZOLIN 2000 MG: 2 INJECTION, POWDER, FOR SOLUTION INTRAMUSCULAR; INTRAVENOUS at 15:07

## 2022-11-01 RX ADMIN — CEFAZOLIN 2000 MG: 2 INJECTION, POWDER, FOR SOLUTION INTRAMUSCULAR; INTRAVENOUS at 20:09

## 2022-11-01 RX ADMIN — DEXAMETHASONE SODIUM PHOSPHATE 8 MG: 4 INJECTION, SOLUTION INTRAMUSCULAR; INTRAVENOUS at 15:19

## 2022-11-01 RX ADMIN — LORAZEPAM 2 MG: 1 TABLET ORAL at 20:06

## 2022-11-01 RX ADMIN — HYDROMORPHONE HYDROCHLORIDE 0.5 MG: 1 INJECTION, SOLUTION INTRAMUSCULAR; INTRAVENOUS; SUBCUTANEOUS at 16:21

## 2022-11-01 RX ADMIN — PROPOFOL 100 MG: 10 INJECTION, EMULSION INTRAVENOUS at 15:09

## 2022-11-01 RX ADMIN — Medication 100 MCG: at 16:46

## 2022-11-01 RX ADMIN — Medication 100 MCG: at 16:27

## 2022-11-01 RX ADMIN — ROCURONIUM BROMIDE 20 MG: 10 INJECTION INTRAVENOUS at 15:29

## 2022-11-01 RX ADMIN — SODIUM CHLORIDE: 9 INJECTION, SOLUTION INTRAVENOUS at 15:06

## 2022-11-01 RX ADMIN — HYDROMORPHONE HYDROCHLORIDE 0.25 MG: 1 INJECTION, SOLUTION INTRAMUSCULAR; INTRAVENOUS; SUBCUTANEOUS at 15:43

## 2022-11-01 RX ADMIN — SODIUM CHLORIDE, PRESERVATIVE FREE 10 ML: 5 INJECTION INTRAVENOUS at 20:10

## 2022-11-01 RX ADMIN — HYDROMORPHONE HYDROCHLORIDE 1 MG: 1 INJECTION, SOLUTION INTRAMUSCULAR; INTRAVENOUS; SUBCUTANEOUS at 18:32

## 2022-11-01 RX ADMIN — LIDOCAINE HYDROCHLORIDE 80 MG: 20 INJECTION, SOLUTION EPIDURAL; INFILTRATION; INTRACAUDAL; PERINEURAL at 15:09

## 2022-11-01 RX ADMIN — Medication 100 MCG: at 16:04

## 2022-11-01 RX ADMIN — MIDAZOLAM 2 MG: 1 INJECTION INTRAMUSCULAR; INTRAVENOUS at 15:06

## 2022-11-01 RX ADMIN — ONDANSETRON 4 MG: 2 INJECTION INTRAMUSCULAR; INTRAVENOUS at 18:32

## 2022-11-01 RX ADMIN — ROCURONIUM BROMIDE 5 MG: 10 INJECTION INTRAVENOUS at 15:09

## 2022-11-01 RX ADMIN — Medication 1000 MG: at 15:19

## 2022-11-01 RX ADMIN — HYDROMORPHONE HYDROCHLORIDE 1 MG: 1 INJECTION, SOLUTION INTRAMUSCULAR; INTRAVENOUS; SUBCUTANEOUS at 11:04

## 2022-11-01 RX ADMIN — HYDROMORPHONE HYDROCHLORIDE 1 MG: 1 INJECTION, SOLUTION INTRAMUSCULAR; INTRAVENOUS; SUBCUTANEOUS at 22:20

## 2022-11-01 RX ADMIN — GLYCOPYRROLATE 0.2 MG: 0.2 INJECTION, SOLUTION INTRAMUSCULAR; INTRAVENOUS at 17:05

## 2022-11-01 RX ADMIN — ONDANSETRON 4 MG: 2 INJECTION INTRAMUSCULAR; INTRAVENOUS at 15:19

## 2022-11-01 RX ADMIN — Medication 100 MG: at 15:09

## 2022-11-01 RX ADMIN — SUGAMMADEX 200 MG: 100 INJECTION, SOLUTION INTRAVENOUS at 17:19

## 2022-11-01 RX ADMIN — SODIUM CHLORIDE, POTASSIUM CHLORIDE, SODIUM LACTATE AND CALCIUM CHLORIDE: 600; 310; 30; 20 INJECTION, SOLUTION INTRAVENOUS at 11:11

## 2022-11-01 RX ADMIN — Medication 100 MCG: at 16:05

## 2022-11-01 RX ADMIN — ROCURONIUM BROMIDE 25 MG: 10 INJECTION INTRAVENOUS at 15:19

## 2022-11-01 RX ADMIN — Medication 20 MG: at 17:10

## 2022-11-01 RX ADMIN — HYDROMORPHONE HYDROCHLORIDE 0.25 MG: 1 INJECTION, SOLUTION INTRAMUSCULAR; INTRAVENOUS; SUBCUTANEOUS at 00:40

## 2022-11-01 RX ADMIN — Medication 200 MCG: at 16:12

## 2022-11-01 RX ADMIN — FENTANYL CITRATE 50 MCG: 50 INJECTION INTRAMUSCULAR; INTRAVENOUS at 15:09

## 2022-11-01 RX ADMIN — HYDROMORPHONE HYDROCHLORIDE 0.25 MG: 1 INJECTION, SOLUTION INTRAMUSCULAR; INTRAVENOUS; SUBCUTANEOUS at 15:55

## 2022-11-01 RX ADMIN — HYDROMORPHONE HYDROCHLORIDE 0.5 MG: 1 INJECTION, SOLUTION INTRAMUSCULAR; INTRAVENOUS; SUBCUTANEOUS at 07:48

## 2022-11-01 RX ADMIN — Medication 100 MCG: at 16:58

## 2022-11-01 RX ADMIN — FENTANYL CITRATE 50 MCG: 50 INJECTION INTRAMUSCULAR; INTRAVENOUS at 15:29

## 2022-11-01 RX ADMIN — SODIUM CHLORIDE: 9 INJECTION, SOLUTION INTRAVENOUS at 16:51

## 2022-11-01 ASSESSMENT — PAIN SCALES - WONG BAKER
WONGBAKER_NUMERICALRESPONSE: 2
WONGBAKER_NUMERICALRESPONSE: 2
WONGBAKER_NUMERICALRESPONSE: 0
WONGBAKER_NUMERICALRESPONSE: 2
WONGBAKER_NUMERICALRESPONSE: 0
WONGBAKER_NUMERICALRESPONSE: 2

## 2022-11-01 ASSESSMENT — PAIN DESCRIPTION - ONSET
ONSET: ON-GOING

## 2022-11-01 ASSESSMENT — PAIN SCALES - GENERAL
PAINLEVEL_OUTOF10: 10
PAINLEVEL_OUTOF10: 0
PAINLEVEL_OUTOF10: 5
PAINLEVEL_OUTOF10: 10
PAINLEVEL_OUTOF10: 7
PAINLEVEL_OUTOF10: 7
PAINLEVEL_OUTOF10: 9
PAINLEVEL_OUTOF10: 7
PAINLEVEL_OUTOF10: 9

## 2022-11-01 ASSESSMENT — PAIN - FUNCTIONAL ASSESSMENT
PAIN_FUNCTIONAL_ASSESSMENT: PREVENTS OR INTERFERES SOME ACTIVE ACTIVITIES AND ADLS
PAIN_FUNCTIONAL_ASSESSMENT: PREVENTS OR INTERFERES WITH ALL ACTIVE AND SOME PASSIVE ACTIVITIES
PAIN_FUNCTIONAL_ASSESSMENT: PREVENTS OR INTERFERES SOME ACTIVE ACTIVITIES AND ADLS

## 2022-11-01 ASSESSMENT — PAIN DESCRIPTION - FREQUENCY
FREQUENCY: CONTINUOUS

## 2022-11-01 ASSESSMENT — PAIN DESCRIPTION - DESCRIPTORS
DESCRIPTORS: ACHING
DESCRIPTORS: ACHING;SHARP
DESCRIPTORS: STABBING
DESCRIPTORS: ACHING;SHARP
DESCRIPTORS: ACHING;DISCOMFORT
DESCRIPTORS: ACHING;SHARP
DESCRIPTORS: ACHING

## 2022-11-01 ASSESSMENT — PAIN DESCRIPTION - ORIENTATION
ORIENTATION: RIGHT

## 2022-11-01 ASSESSMENT — PAIN DESCRIPTION - LOCATION
LOCATION: HIP

## 2022-11-01 ASSESSMENT — PAIN DESCRIPTION - PAIN TYPE
TYPE: ACUTE PAIN
TYPE: SURGICAL PAIN
TYPE: ACUTE PAIN
TYPE: SURGICAL PAIN

## 2022-11-01 NOTE — INTERVAL H&P NOTE
Update History & Physical    The patient's History and Physical of November 31, 2022 was reviewed with the patient and I examined the patient. There was no change. The surgical site was confirmed by the patient and me. Plan: The risks, benefits, expected outcome, and alternative to the recommended procedure have been discussed with the patient. Patient understands and wants to proceed with the procedure.      Electronically signed by Sascha Hernandez MD on 11/1/2022 at 3:03 PM

## 2022-11-01 NOTE — PROGRESS NOTES
[id number] [facility] Orthopedic Surgery   Progress Note      S/P :  SUBJECTIVE  in bed. Alert and oriented. , Feeling nervous. States she does this before surgery. Pain is   described in right hip and with the intensity of moderate. Pain is described as aching. OBJECTIVE              Physical                      VITALS:  /76   Pulse 81   Temp 97.3 °F (36.3 °C) (Oral)   Resp 18   Wt 138 lb 3.7 oz (62.7 kg)   SpO2 92%   BMI 19.83 kg/m²                     MUSCULOSKELETAL:  right foot NVI. Wiggles toes to command. Able to plantarflex and dorsiflex ankle Pedal pulses are palpable.                     NEUROLOGIC:                                  Sensory:  Touch:  Right Lower Extremity:  normal              Data       CBC:   Lab Results   Component Value Date/Time    WBC 10.4 11/01/2022 04:52 AM    RBC 4.09 11/01/2022 04:52 AM    HGB 13.2 11/01/2022 04:52 AM    HCT 38.8 11/01/2022 04:52 AM    MCV 95.0 11/01/2022 04:52 AM    MCH 32.3 11/01/2022 04:52 AM    MCHC 34.0 11/01/2022 04:52 AM    RDW 13.8 11/01/2022 04:52 AM     11/01/2022 04:52 AM    MPV 9.7 11/01/2022 04:52 AM        WBC:    Lab Results   Component Value Date/Time    WBC 10.4 11/01/2022 04:52 AM        Hemoglobin/Hematocrit:    Lab Results   Component Value Date/Time    HGB 13.2 11/01/2022 04:52 AM    HCT 38.8 11/01/2022 04:52 AM        PT/INR:    Lab Results   Component Value Date/Time    PROTIME 14.3 10/31/2022 12:02 PM    INR 1.12 10/31/2022 12:02 PM              Current Inpatient Medications             Current Facility-Administered Medications: HYDROmorphone (DILAUDID) injection 0.5 mg, 0.5 mg, IntraVENous, Q3H PRN **OR** HYDROmorphone (DILAUDID) injection 1 mg, 1 mg, IntraVENous, Q3H PRN  aspirin chewable tablet 81 mg, 81 mg, Oral, Daily  LORazepam (ATIVAN) tablet 2 mg, 2 mg, Oral, Q6H PRN  atorvastatin (LIPITOR) tablet 10 mg, 10 mg, Oral, Daily  sodium chloride flush 0.9 % injection 5-40 mL, 5-40 mL, IntraVENous, 2 times per day  sodium chloride flush 0.9 % injection 5-40 mL, 5-40 mL, IntraVENous, PRN  0.9 % sodium chloride infusion, , IntraVENous, PRN  ondansetron (ZOFRAN-ODT) disintegrating tablet 4 mg, 4 mg, Oral, Q8H PRN **OR** ondansetron (ZOFRAN) injection 4 mg, 4 mg, IntraVENous, Q6H PRN  polyethylene glycol (GLYCOLAX) packet 17 g, 17 g, Oral, Daily PRN  lactated ringers infusion, , IntraVENous, Continuous  enoxaparin (LOVENOX) injection 40 mg, 40 mg, SubCUTAneous, Daily    ASSESSMENT AND PLAN    Fall at home  Right hip pain  Right hip xray pending    Keep NPO for surgery for right hip surgery today    I saw and evaluated the patient and reviewed the APPs note. I agree with the APPs findings and plan. 69-year-old female with a displaced right femoral neck fracture. Of note she is a community ambulator lives at home with family does not use assistive devices. Denies any antecedent hip pain. Given her history I do believe she would be a candidate for a total hip arthroplasty, posterior hemiarthroplasty I did discuss this with her and the patient. We discussed the diagnosis and treatment options in detail with the patient. I explained the nature of her right hip fracture and poor outcomes with nonoperative treatment and fixation  We discussed the surgical procedure, recovery period, and protocol for pain management, expected post-operative course in detail. We discussed the potential benefits of the surgery including pain relief and improved range of motion as well as the inherent risks including but are not limited to incomplete resolution of symptoms, infection, dislocation, leg length inequality requiring shoe lift, fracture, implant loosening, hardware failure, nerve or vascular injury, need for further surgery, deep vein thrombus, pulmonary embolism. The patient has verbalized understanding of these risks and wishes to proceed.     Mary Montilla MD  Orthopedic Surgery, Adult Reconstruction

## 2022-11-01 NOTE — ANESTHESIA PRE PROCEDURE
[facility] Department of Anesthesiology  Pre-Anesthesia Evaluation/Consultation       Name:  Rachell Cook  : 1950  Age:  74 y.o. MRN:  [id number]  Date: 2022           Surgeon: Surgeon(s):  Shameka Gallardo MD    Procedure: Procedure(s):  RIGHT ANTERIOR TOTAL HIP REPLACEMENT     No Known Allergies  Patient Active Problem List   Diagnosis    Closed fracture of right hip ([address]    Closed right hip fracture, initial encounter [facility])     Past Medical History:   Diagnosis Date    Arthritis     Chronic back pain     Hyperlipidemia     Restless leg syndrome      Past Surgical History:   Procedure Laterality Date    BACK SURGERY      x 5 disc surgeries    TUBAL LIGATION       Social History     Tobacco Use    Smoking status: Every Day     Packs/day: 0.50     Types: Cigarettes    Smokeless tobacco: Never   Substance Use Topics    Alcohol use: Yes     Comment: occasional    Drug use: Never     Medications  No current facility-administered medications on file prior to encounter. Current Outpatient Medications on File Prior to Encounter   Medication Sig Dispense Refill    methocarbamol (ROBAXIN) 500 MG tablet Take 500 mg by mouth in the morning and at bedtime      simvastatin (ZOCOR) 40 MG tablet Take 40 mg by mouth every morning      aspirin 81 MG chewable tablet Take 81 mg by mouth daily      LORazepam (ATIVAN) 2 MG tablet Take 2 mg by mouth every 6 hours as needed for Anxiety.        Current Facility-Administered Medications   Medication Dose Route Frequency Provider Last Rate Last Admin    HYDROmorphone (DILAUDID) injection 0.5 mg  0.5 mg IntraVENous Q3H PRN Mark Christian MD        Or    HYDROmorphone (DILAUDID) injection 1 mg  1 mg IntraVENous Q3H PRN Mark Christian MD   1 mg at 22 1104    aspirin chewable tablet 81 mg  81 mg Oral Daily Baron Jamshid MD        LORazepam (ATIVAN) tablet 2 mg  2 mg Oral Q6H PRN Atif Murrieta MD   2 mg at 10/31/22 1853    atorvastatin (LIPITOR) tablet 10 mg  10 mg Oral Daily Atif Murrieta MD        sodium chloride flush 0.9 % injection 5-40 mL  5-40 mL IntraVENous 2 times per day Atif Murrieta MD   10 mL at 10/31/22 2145    sodium chloride flush 0.9 % injection 5-40 mL  5-40 mL IntraVENous PRN Atif Murrieta MD        0.9 % sodium chloride infusion   IntraVENous PRN Atif Murrieta MD        ondansetron (ZOFRAN-ODT) disintegrating tablet 4 mg  4 mg Oral Q8H PRN Atif Murrieta MD        Or    [facility]dansetron [facility]) injection 4 mg  4 mg IntraVENous Q6H PRN Atif Murrieta MD        polyethylene glycol [facility]) packet 17 g  17 g Oral Daily PRN Atif Murrieta MD        lactated ringers infusion   IntraVENous Continuous Atif Murrieta MD 75 mL/hr at 22 1111 New Bag at 22 1111    enoxaparin (LOVENOX) injection 40 mg  40 mg SubCUTAneous Daily Atif Murrieta MD         Vital Signs (Current)   Vitals:    22 1401   BP: 113/71   Pulse: 78   Resp: 20   Temp: 99 °F (37.2 °C)   SpO2: 93%     Vital Signs Statistics (for past 48 hrs)     Temp  Av °F (36.7 °C)  Min: 97.3 °F (36.3 °C)   Min taken time: 22 0708  Max: 99 °F (37.2 °C)   Max taken time: 22 1401  Pulse  Av.4  Min: 66   Min taken time: 22 1401  Max: 101   Max taken time: 10/31/22 1430  Resp  Av.4  Min: 13   Min taken time: 10/31/22 1415  Max: 20   Max taken time: 22 1401  BP  Min: 113/71   Min taken time: 22 1401  Max: 150/84   Max taken time: 10/31/22 1415  MAP (mmHg)  Av  Min: 80   Min taken time: 22 0708  Max: 106   Max taken time: 10/31/22 1914  SpO2  Av.8 %  Min: 90 %   Min taken time: 10/31/22 1914  Max: 94 %   Max taken time: 10/31/22 1135    BP Readings from Last 3 Encounters:   22 113/71   20 (!) 139/97   20 (!) 126/92     BMI  Body mass index is 19.83 kg/m². Estimated body mass index is 19.83 kg/m² as calculated from the following:    Height as of 11/9/20: 5' 10\" (1.778 m). Weight as of this encounter: 138 lb 3.7 oz (62.7 kg).     CBC   Lab Results   Component Value Date/Time    WBC 10.4 11/01/2022 04:52 AM    RBC 4.09 11/01/2022 04:52 AM    HGB 13.2 11/01/2022 04:52 AM    HCT 38.8 11/01/2022 04:52 AM    MCV 95.0 11/01/2022 04:52 AM    RDW 13.8 11/01/2022 04:52 AM     11/01/2022 04:52 AM     CMP    Lab Results   Component Value Date/Time     11/01/2022 04:52 AM    K 4.0 11/01/2022 04:52 AM     11/01/2022 04:52 AM    CO2 27 11/01/2022 04:52 AM    BUN 19 11/01/2022 04:52 AM    CREATININE 0.7 11/01/2022 04:52 AM    AGRATIO 1.5 10/31/2022 12:02 PM    LABGLOM >60 11/01/2022 04:52 AM    GLUCOSE 110 11/01/2022 04:52 AM    PROT 6.5 10/31/2022 12:02 PM    CALCIUM 8.9 11/01/2022 04:52 AM    BILITOT 0.9 10/31/2022 12:02 PM    ALKPHOS 114 10/31/2022 12:02 PM    AST 40 10/31/2022 12:02 PM    ALT 21 10/31/2022 12:02 PM     BMP    Lab Results   Component Value Date/Time     11/01/2022 04:52 AM    K 4.0 11/01/2022 04:52 AM     11/01/2022 04:52 AM    CO2 27 11/01/2022 04:52 AM    BUN 19 11/01/2022 04:52 AM    CREATININE 0.7 11/01/2022 04:52 AM    CALCIUM 8.9 11/01/2022 04:52 AM    LABGLOM >60 11/01/2022 04:52 AM    GLUCOSE 110 11/01/2022 04:52 AM     POCGlucose  Recent Labs     10/31/22  1202 11/01/22  0452   GLUCOSE 139* 110*      Coags    Lab Results   Component Value Date/Time    PROTIME 14.3 10/31/2022 12:02 PM    INR 1.12 10/31/2022 12:02 PM     HCG (If Applicable) No results found for: PREGTESTUR, PREGSERUM, HCG, HCGQUANT   ABGs No results found for: PHART, PO2ART, LPQ2FUF, UEB5LFY, BEART, M0GDPTNA   Type & Screen (If Applicable)  No results found for: LABABO, LABRH                         BMI: Wt Readings from Last 3 Encounters:       NPO Status:   Date of last liquid consumption: 10/31/22       Date of last solid food consumption: 10/31/22              Anesthesia Evaluation  Patient summary reviewed no history of anesthetic complications:   Airway: Mallampati: III  TM distance: >3 FB   Neck ROM: full  Mouth opening: > = 3 FB   Dental:    (+) partials      Pulmonary:Negative Pulmonary ROS and normal exam                               Cardiovascular:  Exercise tolerance: good (>4 METS),   (+) hyperlipidemia        Rhythm: regular  Rate: normal           Beta Blocker:  Not on Beta Blocker         Neuro/Psych:   (+) neuromuscular disease (Chronic LBP):,             GI/Hepatic/Renal: Neg GI/Hepatic/Renal ROS       (-) GERD       Endo/Other:                      ROS comment: Chronic benzo use Abdominal:             Vascular: negative vascular ROS. Other Findings:           Anesthesia Plan      general     ASA 3       Induction: intravenous. MIPS: Postoperative opioids intended and Prophylactic antiemetics administered. Anesthetic plan and risks discussed with patient. Use of blood products discussed with patient whom consented to blood products. Plan discussed with CRNA. This pre-anesthesia assessment may be used as a history and physical.    DOS STAFF ADDENDUM:    Pt seen and examined, chart reviewed (including anesthesia, drug and allergy history). No interval changes to history and physical examination. Anesthetic plan, risks, benefits, alternatives, and personnel involved discussed with patient. Questions and concerns addressed. Patient(family) verbalized an understanding and agrees to proceed.       Robyn Tavares MD  November 1, 2022  2:09 PM

## 2022-11-01 NOTE — PROGRESS NOTES
Pt returned to room 3119 from PACU. She c/o R hip pain rated 7/10, but denied having any numbness or tingling. Pulses palpable, skin warm to touch. She has strong flexion in BLE. Dressing to R hip is CDI. Fall precautions in place, belongings within reach. Will continue to monitor and assess.

## 2022-11-01 NOTE — INTERVAL H&P NOTE
Update History & Physical    The patient's History and Physical of October 31, 2022 was reviewed with the patient and I examined the patient. There was no change. The surgical site was confirmed by the patient and me. Plan: The risks, benefits, expected outcome, and alternative to the recommended procedure have been discussed with the patient. Patient understands and wants to proceed with the procedure.      Electronically signed by Mohan Byrne MD on 11/1/2022 at 3:04 PM

## 2022-11-01 NOTE — PROGRESS NOTES
Pt resting in bed at beginning of shift. She c/o severe pain in R hip, rated 10/10. Pulses palpable, skin cool to touch. She denies having any numbness or tingling. Pt with weak flexion in LLE. [place] in place draining clear yellow urine. She stated she has not spoken yet with MD about surgery and wants to wait to sign consent until she is able to speak with him. Fall precautions in place, belongings within reach. Will continue to monitor and assess.

## 2022-11-01 NOTE — INTERVAL H&P NOTE
Update History & Physical    The patient's History and Physical of November 1, 2022 was reviewed with the patient and I examined the patient. There was no change. The surgical site was confirmed by the patient and me. Plan: The risks, benefits, expected outcome, and alternative to the recommended procedure have been discussed with the patient. Patient understands and wants to proceed with the procedure.      Electronically signed by Darrell Park MD on 11/1/2022 at 2:58 PM

## 2022-11-01 NOTE — PROGRESS NOTES
[address] to PACU. VSS. On 4L O2. Patent IV. Dressing clean, dry and intact. [address] PACU recovery complete. On room air. IV patent. Tolerating PO. Dressing clean, dry and intact.

## 2022-11-01 NOTE — PLAN OF CARE
Problem: Discharge Planning  Goal: Discharge to home or other facility with appropriate resources  Outcome: Progressing     Problem: Pain  Goal: Verbalizes/displays adequate comfort level or baseline comfort level  Outcome: Progressing  Flowsheets (Taken 11/1/2022 1303)  Verbalizes/displays adequate comfort level or baseline comfort level:   Encourage patient to monitor pain and request assistance   Administer analgesics based on type and severity of pain and evaluate response   Assess pain using appropriate pain scale   Implement non-pharmacological measures as appropriate and evaluate response     Problem: Musculoskeletal - Adult  Goal: Return mobility to safest level of function  Outcome: Progressing  Flowsheets (Taken 11/1/2022 1303)  Return Mobility to Safest Level of Function:   Assess patient stability and activity tolerance for standing, transferring and ambulating with or without assistive devices   Assist with transfers and ambulation using safe patient handling equipment as needed   Ensure adequate protection for wounds/incisions during mobilization   Obtain physical therapy/occupational therapy consults as needed   Instruct patient/family in ordered activity level  Goal: Maintain proper alignment of affected body part  Outcome: Progressing  Flowsheets (Taken 11/1/2022 1303)  Maintain proper alignment of affected body part:   Support and protect limb and body alignment per provider's orders   Instruct and reinforce with patient and family use of appropriate assistive device and precautions (e.g. spinal or hip dislocation precautions)  Goal: Return ADL status to a safe level of function  Outcome: Progressing  Flowsheets (Taken 11/1/2022 1303)  Return ADL Status to a Safe Level of Function:   Administer medication as ordered   Obtain physical therapy/occupational therapy consults as needed   Assess activities of daily living deficits and provide assistive devices as needed   Assist and instruct patient to increase activity and self care as tolerated     Problem: Genitourinary - Adult  Goal: Urinary catheter remains patent  Outcome: Progressing  Flowsheets (Taken 11/1/2022 1303)  Urinary catheter remains patent: Assess patency of urinary catheter     Problem: Infection - Adult  Goal: Absence of infection during hospitalization  Outcome: Progressing  Flowsheets (Taken 11/1/2022 1303)  Absence of infection during hospitalization:   Assess and monitor for signs and symptoms of infection   Monitor lab/diagnostic results   Administer medications as ordered

## 2022-11-01 NOTE — BRIEF OP NOTE
Brief Postoperative Note      Patient: Jc Casey  YOB: 1950  MRN: [id number]    Date of Procedure: 11/1/2022    Pre-Op Diagnosis: RIGHT HIP FRACTURE    Post-Op Diagnosis: Same       Procedure(s):  RIGHT ANTERIOR TOTAL HIP REPLACEMENT    Surgeon(s):  Mohan Byrne MD    Assistant:  Surgical Assistant: Jodi Yanes    Anesthesia: General    Estimated Blood Loss (mL): 497     Complications: None    Specimens:   * No specimens in log *    Implants:  Implant Name Type Inv.  Item Serial No.  Lot No. LRB No. Used Action   SHELL ACET SZ D [id number] 3 H OSSEOTI LIMIT H 2 MOBILITY G7 - [id number]  SHELL ACET SZ D [id number] 3 H OSSEOTI LIMIT H 2 MOBILITY G7  [employer]- [id number] Right 1 Implanted   BONE SCREW 6.5X40 SELF-TAP - [id number]  BONE SCREW 6.5X40 SELF-TAP  [employer]-WD [id number] Right 1 Implanted   BONE SCREW 6.5X35 SELF-TAP - [id number]  BONE SCREW 6.5X35 SELF-TAP  [employer]WD [id number] Right 1 Implanted   G7 DUAL MOBILITY LINER 40MM D - [id number]  G7 DUAL MOBILITY LINER 40MM D  [employer]-WD [id number] Right 1 Implanted   AVENIR COMPLETE STANDARD COLLARED SIZE 5 - [id number]  Avenir Complete Standard Collared Size 5  [employer]-WD [id number] Right 1 Implanted   HEAD FEM 3.5+ MM 12/14 28 MM HIP TAPR BIOLOX DELT - [id number]  HEAD FEM 3.5+ MM 12/14 28 MM HIP TAPR BIOLOX DELT  [employer]-WD [id number] Right 1 Implanted   VIVACIT-E DM BEARING [id number] - [id number]  VIVACIT-E DM BEARING [id number]  [employer]- [id number] Right 1 Implanted         Drains:   Urinary Catheter 10/31/22 [place]vall (Active)   Catheter Indications Prolonged immobilization (e.g. unstable thoracic or lumbar spine, multiple traumatic injuries such as pelvic fractures) 11/01/22 0837   Site Assessment No urethral drainage 11/01/22 0837   Urine Color Yellow 11/01/22 0837   Urine Appearance Clear 11/01/22 0837   Collection Container Standard 11/01/22 2100   Securement Method Securing device (Describe) 10/31/22 1448   Catheter Best Practices  Drainage tube clipped to bed; Bag below bladder;Bag not on floor; Lack of dependent loop in tubing;Drainage bag less than half full 11/01/22 0837   Status Patent 11/01/22 0837       Findings: see operative report    Plan of care:   Weight bearing status- as tolerated on operative extremity   Precautions- none   Pain control- Tylenol 1000mg TID, toradol x 48 hrs, oxycodone PRN   Duvall- remove POD 1  Drain- none   Wound care- monocryl, compressive  Antibiotics- perioperative  ancef, will continue 7 days keflex/duricef due to smoking status  DVT ppx- ASA BID when mobile, hold ppx until 24 hours postop   Dispo- home pending ptot   Follow up- 2 weeks for wound check

## 2022-11-01 NOTE — OP NOTE
Patient: Jamie Quick                    : 1950     MRN: [id number]     Date: 22     SURGEON: Mohan Byrne MD           PREOPERATIVE DIAGNOSIS: RIGHT hip femoral neck fracture with displacement        POSTOPERATIVE DIAGNOSIS: same    PROCEDURE: RIGHT total hip arthroplasty. CPT: MACI 83701, Intraoperative fluoro with interpretation- 40691-86    ANESTHESIA: General    COMPLICATIONS:  None. ESTIMATED BLOOD LOSS: 200 mL. SPECIMENS:  none    DRAINS:  None     IMPLANTS:   Implant Name Type Inv. Item Serial No.  Lot No. LRB No. Used Action   SHELL ACET SZ D [id number] 3 H OSSEOTI LIMIT H 2 MOBILITY G7 - [id number]  SHELL ACET SZ D [id number] 3 H OSSEOTI LIMIT H 2 MOBILITY G7  [employer]WD [id number] Right 1 Implanted   BONE SCREW 6.5X40 SELF-TAP - [id number]  BONE SCREW 6.5X40 SELF-TAP  [employer]WD [id number] Right 1 Implanted   BONE SCREW 6.5X35 SELF-TAP - [id number]  BONE SCREW 6.5X35 SELF-TAP  [employer]WD [id number] Right 1 Implanted   G7 DUAL MOBILITY LINER 40MM D - [id number]  G7 DUAL MOBILITY LINER 40MM D  [employer]WD [id number] Right 1 Implanted   AVENIR COMPLETE STANDARD COLLARED SIZE 5 - [id number]  Avenir Complete Standard Collared Size 5  [employer]WD [id number] Right 1 Implanted   HEAD FEM 3.5+ MM 12/14 28 MM HIP TAPR BIOLOX DELT - [id number]  HEAD FEM 3.5+ MM 12/14 28 MM HIP TAPR BIOLOX DELT  [employer]WD [id number] Right 1 Implanted   VIVACIT-E DM BEARING [id number] - [id number]  VIVACIT-E DM BEARING [id number]  [employer]-WD [id number] Right 1 Implanted        INDICATIONS FOR OPERATION: Jc Casey is a 74 y.o. female who fell sustaining a displaced femoral neck fracture. Due to her ambulatory status and functional level a decision was made to proceed with total hip arthroplasty.  In the hospital, the risks and benefits of surgery were discussed with the patient, as well as the alternatives to surgery and the expected post-operative course. Informed consent was obtained. DESCRIPTION OF OPERATION: She was met in the preoperative holding area where the informed consent was reviewed and the operative site was marked. Subjective and clinical leg lengths were assessed. She was then taken back to the Operating Room. After induction of spinal anesthesia, she was then placed into the supine position with a pelvic bump. All bony prominences were well padded. The operative extremity was prepped and draped in the usual sterile fashion. A safety timeout was performed where the correct patient, planned operative procedure, and correct operative site was reviewed and agreed upon by all Operating Room staff. It was also confirmed that the patient had received a dose of intravenous prophylactic antibiotics. The planned incision was marked along the anterior aspect of the hip, slightly lateral and distal to the ASIS. After the incision, the subcutaneous adipose tissue was dissected with electrocautery down to the level of the fascia. A Ennis elevator was used to better visualize the fascial layer directly over the TFL. A scalpel was used to incise the fascia in line with the overlying incision. The fascia was elevated medially identifying the interval between the TFL and rectus femoris. A ascending circumflex vessels were identified and carefully ligated with electrocautery. Next, the deep TFL fascia was incised exposing the capsule overlying the femoral neck. Retractors were placed and the capsule was opened in line with the femoral neck. The superior and inferior capsular leaflets were tagged with suture for later repair. The femoral neck was then cut in a napkin ring  technique and removed piecewise. The femoral head showed significant signs of degenerative disease. The acetabulum likewise had evidence of degenerative changes.  Next, using electrocautery and a kocher, the labrum and periacetabular tissue was removed to further expose the acetabulum. Retractors were then positioned to expose the acetabulum. After  adequate visualization of the acetabulum was obtained, the acetabulum was sequentially reamed in 1mm increments up to 1 mm under-reaming . The acetabular shell was placed into the acetabulum with rim-fit, paying close attention to its orientation. The shell was impacted into place until it was well seated. Two bone screws were then placed through the holes in the acetabular shell into the ilium. The acetabular shell was thoroughly irrigated and a dual mobility  liner was placed into the shell. The liner was impacted and it was confirmed that the liner was securely in place. An osteotome and rongeur was used to remove osteophytes from the rim of the acetabulum to help minimize the risk of impingement. Attention was then addressed to the femur. The pirformis fossa was cleaned of any remaining soft tissue and a rongeur was used to remove the remnant bony at the posterior lateral corner of the femoral neck. A box osteotome was then used to create a bony canal at the lateral aspect of the base of the femoral neck. The canal finder was placed down the femoral canal.  The femoral canal was then prepared with a series of sequential broaches paying close attention to maintaining consistent and appropriate anteversion of the stem. A trial neck and femoral head was placed atop the broach and the hip was reduced. Leg lengths were checked and range of motion and stability were tested. With the hip in full extension there was no anterior dislocation with  90 degrees of external rotation. The hip was then dislocated and the trial implants were removed from the femur. The final stem was then placed into the prepared canal and gentle impacted until it was fully seated.   A trial femoral head was placed atop the femoral stem and after reduction of the hip, ROM and stability were again verified. The hip was dislocated the trial head was removed. The meeks taper was thoroughly cleaned and a dual mobility femoral head was impacted onto the taper. It was confirmed that the head was securely fit onto the stem. The wound was thoroughly irrigated and the hip was reduced. Hip range of motion and stability was again checked in the positions described above and found to be satisfactory. The wound was infiltrated with a multimodal anesthetic. The fascial layer was closed with a #1 Vicryl in a running fashion. The subcutaneous adipose tissue was reapproximated with 0 Vicryl in a running fashion. The subcuticular layer was closed with a 2-0 Vicryl in a running fashion. The skin was closed with monocryl with dermabond. A sterile dressing was applied. The drapes were taken down and the patient was transferred to the hospital bed. The patient was transported to the Recovery Room in comfortable and stable condition. All counts were correct at the completion of the case. ATTESTATION: Dr. Kwabena Crain was present and scrubbed for the entire procedure.

## 2022-11-01 NOTE — DISCHARGE INSTRUCTIONS
If you use a cpap for sleep apnea, please wear when sleeping while on narcotics. Do not drink alcohol while taking your blood thinner or narcotic pain medication. Do not take any sleep aids while on narcotics. Wear adilia hoses (compression stockings) for 2 weeks and to only remove when showering or at bedtime. Please wear Teds to follow up appointment with orthopedic surgeon. Use your Incentive Spirometer 4 times a day for 1 week after surgery to prevent pneumonia. Use ice packs as needed for swelling and pain. DO NOT apply directly to your skin. Use a clean towel or pillow case as a barrier between your skin and the ice pack. Pain pills and activity changes may lead to constipation. You may need to use a laxative such as Dulcolax, Senokot, Miralax, or Milk of Magnesia. If you do not have a bowel movement daily then we recommend to take a laxative that same evening. When to clean your hands: For patients  In the hospital or in your home, you can come in contact with many harmful germs. To help prevent infection, wash your hands with soap and water often, especially:  Before and After touching or changing a dressing or bandage  After using the bathroom  Before and after eating  After coughing or sneezing  After using a tissue  After touching any object or surface that may be contaminated  After touching an animal during a pet therapy session (hospital)  After touching an animal, cleaning up after a pet, or preparing food for pets (home)    Please contact the Orthopedic office with any questions or concerns after your discharge. If you have any issues or concerns after hours please call the Orthopedic Office to reach the Surgeon on call first at  [phone]. If you need a pain medication refill please contact your surgeon's office.      [facility]:    Monday-Friday 8am- 5pm      [phone]    After Hours Clinic Walk in [address] [place] [address] [address]    Monday-Friday 5pm-9pm  &  Saturday 9am-1pm          [phone]      If you have a medical emergency please call 911 or seek immediate medical help. [address]   [phone]    Call 911 anytime you think you may need emergency care. For example, call if:  You passed out (lost consciousness). You have severe trouble breathing. You have sudden chest pain and shortness of breath, or you cough up blood. Call your doctor now or seek immediate medical care if:    Your leg or foot turns cold or changes color. You have symptoms of a blood clot in your leg (called a deep vein thrombosis), such as:  Pain in your calf, back of the knee, thigh, or groin. Redness and swelling in your leg or groin. You have symptoms of infection, such as: Increased pain, swelling, warmth, or redness. Red streaks leading from the wound. Pus draining from the wound. A fever. easy bruising, unusual bleeding (nose, mouth, vagina, or rectum), bleeding from wounds or needle injections, any bleeding that will not stop;  heavy menstrual periods;  urine that looks red, pink, or brown; or  black or bloody stools, coughing up blood or vomit that looks like coffee grounds.

## 2022-11-01 NOTE — PROGRESS NOTES
Hospitalist Progress Note  11/1/2022 10:42 AM    PCP: Petros Yu MD    [id number]     Date of Admission: 10/31/2022                                                                                                                     HOSPITAL COURSE    Patient demographics:  The patient  Tram Fink is a 74 y.o. female     Significant past medical history:   Patient Active Problem List   Diagnosis    Closed fracture of right hip ([facility] [address]    Closed right hip fracture, initial encounter [facility])         Presenting symptoms:      Diagnostic workup:      CONSULTS DURING ADMISSION :   IP CONSULT TO HOSPITALIST      Patient was diagnosed with:        Treatment while inpatient:  74years old female with medical history significant for chronic back pain multiple lower back surgeries who presented to the emergency room after a fall. Patient was diagnosed with right hip fracture. Orthopedic surgery was consulted and patient underwent ORIF on 11/1.                                                                                       ----------------------------------------------------------      SUBJECTIVE COMPLAINTS-right hip pain    Diet: Diet NPO Exceptions are: Sips of Water with Meds      OBJECTIVE:   Patient Active Problem List   Diagnosis    Closed fracture of right hip ([facility] [address]    Closed right hip fracture, initial encounter ([facility]       Allergies  Patient has no known allergies.     Medications    Scheduled Meds:   aspirin  81 mg Oral Daily    atorvastatin  10 mg Oral Daily    sodium chloride flush  5-40 mL IntraVENous 2 times per day    enoxaparin  40 mg SubCUTAneous Daily     Continuous Infusions:   sodium chloride      lactated ringers 75 mL/hr at 10/31/22 2145     PRN Meds:  LORazepam, sodium chloride flush, sodium chloride, ondansetron **OR** ondansetron, polyethylene glycol, HYDROmorphone **OR** HYDROmorphone    Vitals   Vitals /wt Patient Vitals for the past 8 hrs:   BP Temp Temp src Pulse Resp SpO2 11/01/22 0748 -- -- -- -- 18 --   11/01/22 0740 -- -- -- -- -- 92 %   11/01/22 0708 125/76 97.3 °F (36.3 °C) Oral 81 16 92 %   11/01/22 0401 -- -- -- -- 17 --        72HR INTAKE/OUTPUT:  No intake or output data in the 24 hours ending 11/01/22 1042    Exam:    Gen:   Alert and oriented ×3    Eyes: PERRL. No sclera icterus. No conjunctival injection. ENT: No discharge. Pharynx clear. External appearance of ears and nose normal.  Neck: Trachea midline. No obvious mass. Resp: No accessory muscle use. No crackles. No wheezes. No rhonchi. CV: Regular rate. Regular rhythm. No murmur or rub. No edema. GI: Non-tender. Non-distended. No hernia. Skin: Warm, dry, normal texture and turgor. Lymph: No cervical LAD. No supraclavicular LAD. M/S: / Ext. Right lower extremity in external rotation  Neuro: CN 2-12 are intact,  no neurologic deficits noted. PT/INR:   Recent Labs     10/31/22  1202   PROTIME 14.3   INR 1.12     APTT: No results for input(s): APTT in the last 72 hours. CBC:   Recent Labs     10/31/22  1202 11/01/22  0452   WBC 11.5* 10.4   HGB 13.5 13.2   HCT 40.5 38.8   MCV 96.8 95.0    176       BMP:   Recent Labs     10/31/22  1202 11/01/22  0452    141   K 3.9 4.0    101   CO2 28 27   BUN 13 19   CREATININE 0.5* 0.7       LIVER PROFILE:   Recent Labs     10/31/22  1202   ALKPHOS 114   AST 40*   ALT 21   BILITOT 0.9     No results for input(s): AMYLASE in the last 72 hours. No results for input(s): LIPASE in the last 72 hours. UA:No results for input(s): NITRITE, LABCAST, WBCUA, RBCUA, MUCUS in the last 72 hours. TROPONIN: No results for input(s): Ally Smack in the last 72 hours. No results found for: URRFLXCULT    No results for input(s): TSHREFLEX in the last 72 hours. No components found for: QSM2107  POC GLUCOSE:  No results for input(s): POCGLU in the last 72 hours. No results for input(s): LABA1C in the last 72 hours.  No results found for: LABA1C      ASSESSMENT AND PLAN    Angulated right neck femoral fracture  revised cardiac index 1, class II  Orthopedic surgery is consulted and plan is for surgery today      Pain management  R52  Continue with the IV and oral pain medication     Back pain  Chronic benzodiazepine use  Patient takes lorazepam 2 mg every 6 hours,   which is concerning due to high incidence of falls and adverse reactions         Code Status: Full Code        Dispo - cc      The patient and / or the family were informed of the results of any tests, a time was given to answer questions, a plan was proposed and they agreed with plan. Leonela Jennings MD    This note was transcribed using [id number] [employer]. Please disregard any translational errors.

## 2022-11-02 ENCOUNTER — HOSPITAL ENCOUNTER (INPATIENT)
Age: 72
LOS: 7 days | Discharge: HOME HEALTH | DRG: 561 | End: 2022-11-09
Attending: PHYSICAL MEDICINE & REHABILITATION | Admitting: PHYSICAL MEDICINE & REHABILITATION
Payer: MEDICARE

## 2022-11-02 VITALS
SYSTOLIC BLOOD PRESSURE: 115 MMHG | TEMPERATURE: 97.7 F | HEART RATE: 74 BPM | DIASTOLIC BLOOD PRESSURE: 66 MMHG | OXYGEN SATURATION: 93 % | RESPIRATION RATE: 17 BRPM | BODY MASS INDEX: 19.83 KG/M2 | WEIGHT: 138.23 LBS

## 2022-11-02 DIAGNOSIS — S72.001S CLOSED RIGHT HIP FRACTURE, SEQUELA: Primary | ICD-10-CM

## 2022-11-02 DIAGNOSIS — S72.001A CLOSED RIGHT HIP FRACTURE, INITIAL ENCOUNTER (HCC): ICD-10-CM

## 2022-11-02 LAB
ANION GAP SERPL CALCULATED.3IONS-SCNC: 13 MMOL/L (ref 3–16)
BASOPHILS ABSOLUTE: 0 K/UL (ref 0–0.2)
BASOPHILS RELATIVE PERCENT: 0.1 %
BUN BLDV-MCNC: 19 MG/DL (ref 7–20)
CALCIUM SERPL-MCNC: 8.1 MG/DL (ref 8.3–10.6)
CHLORIDE BLD-SCNC: 106 MMOL/L (ref 99–110)
CO2: 24 MMOL/L (ref 21–32)
CREAT SERPL-MCNC: 0.6 MG/DL (ref 0.6–1.2)
EOSINOPHILS ABSOLUTE: 0 K/UL (ref 0–0.6)
EOSINOPHILS RELATIVE PERCENT: 0 %
GFR SERPL CREATININE-BSD FRML MDRD: >60 ML/MIN/{1.73_M2}
GLUCOSE BLD-MCNC: 140 MG/DL (ref 70–99)
HCT VFR BLD CALC: 33.5 % (ref 36–48)
HEMOGLOBIN: 11.4 G/DL (ref 12–16)
LYMPHOCYTES ABSOLUTE: 0.4 K/UL (ref 1–5.1)
LYMPHOCYTES RELATIVE PERCENT: 3.6 %
MCH RBC QN AUTO: 32.6 PG (ref 26–34)
MCHC RBC AUTO-ENTMCNC: 34.1 G/DL (ref 31–36)
MCV RBC AUTO: 95.7 FL (ref 80–100)
MONOCYTES ABSOLUTE: 0.5 K/UL (ref 0–1.3)
MONOCYTES RELATIVE PERCENT: 3.9 %
NEUTROPHILS ABSOLUTE: 10.8 K/UL (ref 1.7–7.7)
NEUTROPHILS RELATIVE PERCENT: 92.4 %
PDW BLD-RTO: 13.6 % (ref 12.4–15.4)
PLATELET # BLD: 151 K/UL (ref 135–450)
PMV BLD AUTO: 9.4 FL (ref 5–10.5)
POTASSIUM REFLEX MAGNESIUM: 4.4 MMOL/L (ref 3.5–5.1)
RBC # BLD: 3.5 M/UL (ref 4–5.2)
SODIUM BLD-SCNC: 143 MMOL/L (ref 136–145)
WBC # BLD: 11.6 K/UL (ref 4–11)

## 2022-11-02 PROCEDURE — 6370000000 HC RX 637 (ALT 250 FOR IP): Performed by: PHYSICAL MEDICINE & REHABILITATION

## 2022-11-02 PROCEDURE — 6360000002 HC RX W HCPCS: Performed by: ORTHOPAEDIC SURGERY

## 2022-11-02 PROCEDURE — 6370000000 HC RX 637 (ALT 250 FOR IP): Performed by: NURSE PRACTITIONER

## 2022-11-02 PROCEDURE — 80048 BASIC METABOLIC PNL TOTAL CA: CPT

## 2022-11-02 PROCEDURE — 97116 GAIT TRAINING THERAPY: CPT

## 2022-11-02 PROCEDURE — 97162 PT EVAL MOD COMPLEX 30 MIN: CPT

## 2022-11-02 PROCEDURE — 6360000002 HC RX W HCPCS: Performed by: PHYSICAL MEDICINE & REHABILITATION

## 2022-11-02 PROCEDURE — 2580000003 HC RX 258: Performed by: ORTHOPAEDIC SURGERY

## 2022-11-02 PROCEDURE — 97530 THERAPEUTIC ACTIVITIES: CPT

## 2022-11-02 PROCEDURE — 97166 OT EVAL MOD COMPLEX 45 MIN: CPT

## 2022-11-02 PROCEDURE — 6370000000 HC RX 637 (ALT 250 FOR IP): Performed by: ORTHOPAEDIC SURGERY

## 2022-11-02 PROCEDURE — 36415 COLL VENOUS BLD VENIPUNCTURE: CPT

## 2022-11-02 PROCEDURE — 85025 COMPLETE CBC W/AUTO DIFF WBC: CPT

## 2022-11-02 PROCEDURE — 1280000000 HC REHAB R&B

## 2022-11-02 PROCEDURE — 94760 N-INVAS EAR/PLS OXIMETRY 1: CPT

## 2022-11-02 PROCEDURE — 97535 SELF CARE MNGMENT TRAINING: CPT

## 2022-11-02 RX ORDER — ACETAMINOPHEN 325 MG/1
650 TABLET ORAL EVERY 4 HOURS PRN
Status: DISCONTINUED | OUTPATIENT
Start: 2022-11-02 | End: 2022-11-05

## 2022-11-02 RX ORDER — ONDANSETRON 4 MG/1
4 TABLET, ORALLY DISINTEGRATING ORAL EVERY 8 HOURS PRN
Status: DISCONTINUED | OUTPATIENT
Start: 2022-11-02 | End: 2022-11-09 | Stop reason: HOSPADM

## 2022-11-02 RX ORDER — BISACODYL 10 MG
10 SUPPOSITORY, RECTAL RECTAL DAILY PRN
Status: CANCELLED | OUTPATIENT
Start: 2022-11-02

## 2022-11-02 RX ORDER — SENNA AND DOCUSATE SODIUM 50; 8.6 MG/1; MG/1
2 TABLET, FILM COATED ORAL 2 TIMES DAILY
Status: CANCELLED | OUTPATIENT
Start: 2022-11-02

## 2022-11-02 RX ORDER — ENOXAPARIN SODIUM 100 MG/ML
40 INJECTION SUBCUTANEOUS DAILY
Status: CANCELLED | OUTPATIENT
Start: 2022-11-02

## 2022-11-02 RX ORDER — OXYCODONE HYDROCHLORIDE 5 MG/1
5 TABLET ORAL EVERY 4 HOURS PRN
Status: DISCONTINUED | OUTPATIENT
Start: 2022-11-02 | End: 2022-11-02 | Stop reason: HOSPADM

## 2022-11-02 RX ORDER — ATORVASTATIN CALCIUM 10 MG/1
10 TABLET, FILM COATED ORAL DAILY
Status: DISCONTINUED | OUTPATIENT
Start: 2022-11-03 | End: 2022-11-09 | Stop reason: HOSPADM

## 2022-11-02 RX ORDER — ASPIRIN 81 MG/1
81 TABLET, CHEWABLE ORAL 2 TIMES DAILY
Qty: 60 TABLET | Refills: 0 | Status: SHIPPED | OUTPATIENT
Start: 2022-11-02 | End: 2022-11-02 | Stop reason: SDUPTHER

## 2022-11-02 RX ORDER — OXYCODONE HYDROCHLORIDE 5 MG/1
5 TABLET ORAL EVERY 4 HOURS PRN
Status: DISCONTINUED | OUTPATIENT
Start: 2022-11-02 | End: 2022-11-09 | Stop reason: HOSPADM

## 2022-11-02 RX ORDER — LORAZEPAM 1 MG/1
2 TABLET ORAL EVERY 6 HOURS PRN
Status: DISCONTINUED | OUTPATIENT
Start: 2022-11-02 | End: 2022-11-09 | Stop reason: HOSPADM

## 2022-11-02 RX ORDER — BISACODYL 10 MG
10 SUPPOSITORY, RECTAL RECTAL DAILY PRN
Status: DISCONTINUED | OUTPATIENT
Start: 2022-11-02 | End: 2022-11-09 | Stop reason: HOSPADM

## 2022-11-02 RX ORDER — ONDANSETRON 4 MG/1
4 TABLET, ORALLY DISINTEGRATING ORAL EVERY 8 HOURS PRN
Status: CANCELLED | OUTPATIENT
Start: 2022-11-02

## 2022-11-02 RX ORDER — POLYETHYLENE GLYCOL 3350 17 G/17G
17 POWDER, FOR SOLUTION ORAL DAILY
Status: DISCONTINUED | OUTPATIENT
Start: 2022-11-02 | End: 2022-11-09 | Stop reason: HOSPADM

## 2022-11-02 RX ORDER — OXYCODONE HYDROCHLORIDE 5 MG/1
5 TABLET ORAL EVERY 4 HOURS PRN
Status: CANCELLED | OUTPATIENT
Start: 2022-11-02

## 2022-11-02 RX ORDER — OXYCODONE HYDROCHLORIDE 5 MG/1
5 TABLET ORAL EVERY 4 HOURS PRN
Qty: 30 TABLET | Refills: 0 | Status: ON HOLD | OUTPATIENT
Start: 2022-11-02 | End: 2022-11-08 | Stop reason: HOSPADM

## 2022-11-02 RX ORDER — ASPIRIN 81 MG/1
81 TABLET, CHEWABLE ORAL 2 TIMES DAILY
Status: DISCONTINUED | OUTPATIENT
Start: 2022-11-02 | End: 2022-11-09 | Stop reason: HOSPADM

## 2022-11-02 RX ORDER — LORAZEPAM 1 MG/1
2 TABLET ORAL EVERY 6 HOURS PRN
Status: CANCELLED | OUTPATIENT
Start: 2022-11-02

## 2022-11-02 RX ORDER — SODIUM CHLORIDE 0.9 % (FLUSH) 0.9 %
5-40 SYRINGE (ML) INJECTION PRN
Status: CANCELLED | OUTPATIENT
Start: 2022-11-02

## 2022-11-02 RX ORDER — ENOXAPARIN SODIUM 100 MG/ML
40 INJECTION SUBCUTANEOUS EVERY EVENING
Status: DISCONTINUED | OUTPATIENT
Start: 2022-11-02 | End: 2022-11-09 | Stop reason: HOSPADM

## 2022-11-02 RX ORDER — ACETAMINOPHEN 325 MG/1
650 TABLET ORAL EVERY 4 HOURS PRN
Status: CANCELLED | OUTPATIENT
Start: 2022-11-02

## 2022-11-02 RX ORDER — ONDANSETRON 2 MG/ML
4 INJECTION INTRAMUSCULAR; INTRAVENOUS EVERY 6 HOURS PRN
Status: DISCONTINUED | OUTPATIENT
Start: 2022-11-02 | End: 2022-11-09 | Stop reason: HOSPADM

## 2022-11-02 RX ORDER — ASPIRIN 81 MG/1
81 TABLET, CHEWABLE ORAL 2 TIMES DAILY
Qty: 60 TABLET | Refills: 0 | Status: SHIPPED | OUTPATIENT
Start: 2022-11-02 | End: 2022-12-02

## 2022-11-02 RX ORDER — SODIUM CHLORIDE 0.9 % (FLUSH) 0.9 %
5-40 SYRINGE (ML) INJECTION PRN
Status: DISCONTINUED | OUTPATIENT
Start: 2022-11-02 | End: 2022-11-09 | Stop reason: HOSPADM

## 2022-11-02 RX ORDER — SODIUM CHLORIDE 0.9 % (FLUSH) 0.9 %
5-40 SYRINGE (ML) INJECTION EVERY 12 HOURS SCHEDULED
Status: DISCONTINUED | OUTPATIENT
Start: 2022-11-02 | End: 2022-11-05

## 2022-11-02 RX ORDER — OXYCODONE HYDROCHLORIDE 10 MG/1
10 TABLET ORAL EVERY 4 HOURS PRN
Status: DISCONTINUED | OUTPATIENT
Start: 2022-11-02 | End: 2022-11-09 | Stop reason: HOSPADM

## 2022-11-02 RX ORDER — SODIUM CHLORIDE 0.9 % (FLUSH) 0.9 %
5-40 SYRINGE (ML) INJECTION EVERY 12 HOURS SCHEDULED
Status: CANCELLED | OUTPATIENT
Start: 2022-11-02

## 2022-11-02 RX ORDER — OXYCODONE HYDROCHLORIDE 10 MG/1
10 TABLET ORAL EVERY 4 HOURS PRN
Status: CANCELLED | OUTPATIENT
Start: 2022-11-02

## 2022-11-02 RX ORDER — ASPIRIN 81 MG/1
81 TABLET, CHEWABLE ORAL 2 TIMES DAILY
Status: CANCELLED | OUTPATIENT
Start: 2022-11-02

## 2022-11-02 RX ORDER — ONDANSETRON 2 MG/ML
4 INJECTION INTRAMUSCULAR; INTRAVENOUS EVERY 6 HOURS PRN
Status: CANCELLED | OUTPATIENT
Start: 2022-11-02

## 2022-11-02 RX ORDER — ATORVASTATIN CALCIUM 10 MG/1
10 TABLET, FILM COATED ORAL DAILY
Status: CANCELLED | OUTPATIENT
Start: 2022-11-03

## 2022-11-02 RX ORDER — POLYETHYLENE GLYCOL 3350 17 G/17G
17 POWDER, FOR SOLUTION ORAL DAILY
Status: CANCELLED | OUTPATIENT
Start: 2022-11-02

## 2022-11-02 RX ORDER — ASPIRIN 81 MG/1
81 TABLET, CHEWABLE ORAL 2 TIMES DAILY
Status: DISCONTINUED | OUTPATIENT
Start: 2022-11-02 | End: 2022-11-02 | Stop reason: HOSPADM

## 2022-11-02 RX ORDER — SENNA AND DOCUSATE SODIUM 50; 8.6 MG/1; MG/1
2 TABLET, FILM COATED ORAL 2 TIMES DAILY
Status: DISCONTINUED | OUTPATIENT
Start: 2022-11-02 | End: 2022-11-09 | Stop reason: HOSPADM

## 2022-11-02 RX ADMIN — OXYCODONE 5 MG: 5 TABLET ORAL at 07:54

## 2022-11-02 RX ADMIN — LORAZEPAM 2 MG: 1 TABLET ORAL at 17:14

## 2022-11-02 RX ADMIN — OXYCODONE 5 MG: 5 TABLET ORAL at 11:42

## 2022-11-02 RX ADMIN — ENOXAPARIN SODIUM 40 MG: 100 INJECTION SUBCUTANEOUS at 17:15

## 2022-11-02 RX ADMIN — ASPIRIN 81 MG 81 MG: 81 TABLET ORAL at 07:54

## 2022-11-02 RX ADMIN — LORAZEPAM 2 MG: 1 TABLET ORAL at 10:12

## 2022-11-02 RX ADMIN — OXYCODONE HYDROCHLORIDE 10 MG: 10 TABLET ORAL at 21:22

## 2022-11-02 RX ADMIN — HYDROMORPHONE HYDROCHLORIDE 1 MG: 1 INJECTION, SOLUTION INTRAMUSCULAR; INTRAVENOUS; SUBCUTANEOUS at 05:37

## 2022-11-02 RX ADMIN — CEFAZOLIN 2000 MG: 2 INJECTION, POWDER, FOR SOLUTION INTRAMUSCULAR; INTRAVENOUS at 05:39

## 2022-11-02 RX ADMIN — OXYCODONE HYDROCHLORIDE 10 MG: 10 TABLET ORAL at 17:15

## 2022-11-02 RX ADMIN — HYDROMORPHONE HYDROCHLORIDE 1 MG: 1 INJECTION, SOLUTION INTRAMUSCULAR; INTRAVENOUS; SUBCUTANEOUS at 14:59

## 2022-11-02 RX ADMIN — ATORVASTATIN CALCIUM 10 MG: 10 TABLET, FILM COATED ORAL at 07:54

## 2022-11-02 RX ADMIN — HYDROMORPHONE HYDROCHLORIDE 1 MG: 1 INJECTION, SOLUTION INTRAMUSCULAR; INTRAVENOUS; SUBCUTANEOUS at 01:20

## 2022-11-02 RX ADMIN — ASPIRIN 81 MG 81 MG: 81 TABLET ORAL at 21:22

## 2022-11-02 ASSESSMENT — PAIN DESCRIPTION - PAIN TYPE
TYPE: SURGICAL PAIN

## 2022-11-02 ASSESSMENT — PAIN DESCRIPTION - DESCRIPTORS
DESCRIPTORS: ACHING
DESCRIPTORS: ACHING;DISCOMFORT;SORE
DESCRIPTORS: ACHING;DISCOMFORT
DESCRIPTORS: ACHING
DESCRIPTORS: THROBBING
DESCRIPTORS: ACHING
DESCRIPTORS: ACHING

## 2022-11-02 ASSESSMENT — PAIN DESCRIPTION - FREQUENCY
FREQUENCY: CONTINUOUS

## 2022-11-02 ASSESSMENT — PAIN DESCRIPTION - ONSET
ONSET: ON-GOING

## 2022-11-02 ASSESSMENT — PAIN - FUNCTIONAL ASSESSMENT
PAIN_FUNCTIONAL_ASSESSMENT: PREVENTS OR INTERFERES SOME ACTIVE ACTIVITIES AND ADLS
PAIN_FUNCTIONAL_ASSESSMENT: ACTIVITIES ARE NOT PREVENTED
PAIN_FUNCTIONAL_ASSESSMENT: ACTIVITIES ARE NOT PREVENTED
PAIN_FUNCTIONAL_ASSESSMENT: PREVENTS OR INTERFERES SOME ACTIVE ACTIVITIES AND ADLS

## 2022-11-02 ASSESSMENT — PAIN DESCRIPTION - ORIENTATION
ORIENTATION: RIGHT

## 2022-11-02 ASSESSMENT — PAIN SCALES - GENERAL
PAINLEVEL_OUTOF10: 6
PAINLEVEL_OUTOF10: 7
PAINLEVEL_OUTOF10: 4
PAINLEVEL_OUTOF10: 6
PAINLEVEL_OUTOF10: 6
PAINLEVEL_OUTOF10: 7
PAINLEVEL_OUTOF10: 7
PAINLEVEL_OUTOF10: 5
PAINLEVEL_OUTOF10: 9
PAINLEVEL_OUTOF10: 8
PAINLEVEL_OUTOF10: 6
PAINLEVEL_OUTOF10: 6
PAINLEVEL_OUTOF10: 7
PAINLEVEL_OUTOF10: 5

## 2022-11-02 ASSESSMENT — PAIN DESCRIPTION - LOCATION
LOCATION: HIP

## 2022-11-02 NOTE — CONSULTS
Department of [facility] Progress Note  11/2/2022  11:41 AM    Patient Name:   Hellen Pratt  YOB: 1950    Diagnosis: Closed right hip fracture, initial encounter [facility])        Subjective:  Rehab consult received. Chart reviewed. Patient seen. Patient discussed with our rehab unit admission nurse. 75 yo female admitted for a fall resulting in RIGHT hip femoral neck fracture with displacement. Patient is now  s/p 11/1 R MACI now WBAT. Per Dr. Dat Flores no precautions. PMH: Chronic back pain, Restless leg syndrome. Patient lives with family and fully IND no use of AD. Patient started therapies, but is limited by pain and is agreeable to rehab unit treatment before discharge to home. Patient has regular Medicare insurance.     /66   Pulse 74   Temp 97.7 °F (36.5 °C) (Oral)   Resp 17   Wt 138 lb 3.7 oz (62.7 kg)   SpO2 93%   BMI 19.83 kg/m²     Last 24 hour lab  Recent Results (from the past 24 hour(s))   POCT Glucose    Collection Time: 11/01/22  5:26 PM   Result Value Ref Range    POC Glucose 116 (H) 70 - 99 mg/dl    Performed on ACCU-CHEK    Basic Metabolic Panel w/ Reflex to MG    Collection Time: 11/02/22  4:30 AM   Result Value Ref Range    Sodium 143 136 - 145 mmol/L    Potassium reflex Magnesium 4.4 3.5 - 5.1 mmol/L    Chloride 106 99 - 110 mmol/L    CO2 24 21 - 32 mmol/L    Anion Gap 13 3 - 16    Glucose 140 (H) 70 - 99 mg/dL    BUN 19 7 - 20 mg/dL    Creatinine 0.6 0.6 - 1.2 mg/dL    Est, Glom Filt Rate >60 >60    Calcium 8.1 (L) 8.3 - 10.6 mg/dL   CBC with Auto Differential    Collection Time: 11/02/22  4:30 AM   Result Value Ref Range    WBC 11.6 (H) 4.0 - 11.0 K/uL    RBC 3.50 (L) 4.00 - 5.20 M/uL    Hemoglobin 11.4 (L) 12.0 - 16.0 g/dL    Hematocrit 33.5 (L) 36.0 - 48.0 %    MCV 95.7 80.0 - 100.0 fL    MCH 32.6 26.0 - 34.0 pg    MCHC 34.1 31.0 - 36.0 g/dL    RDW 13.6 12.4 - 15.4 %    Platelets 482 148 - 609 K/uL    MPV 9.4 5.0 - 10.5 fL Neutrophils % 92.4 %    Lymphocytes % 3.6 %    Monocytes % 3.9 %    Eosinophils % 0.0 %    Basophils % 0.1 %    Neutrophils Absolute 10.8 (H) 1.7 - 7.7 K/uL    Lymphocytes Absolute 0.4 (L) 1.0 - 5.1 K/uL    Monocytes Absolute 0.5 0.0 - 1.3 K/uL    Eosinophils Absolute 0.0 0.0 - 0.6 K/uL    Basophils Absolute 0.0 0.0 - 0.2 K/uL         Plan: We will plan to admit to our rehab unit today.       Dr. Ivette Norman

## 2022-11-02 NOTE — PROGRESS NOTES
Occupational Therapy  Facility/Department: [address] ORTHOPEDICS  Occupational Therapy Initial Assessment    Name: Vickie Ward  : 1950  MRN: [id number]  Date of Service: 2022    Discharge Recommendations:  5-7 sessions per week, Patient would benefit from continued therapy after discharge        Vickie Ward scored a 16/24 on the AM-PAC ADL Inpatient form. Current research shows that an AM-PAC score of 17 or less is typically not associated with a discharge to the patient's home setting. Based on the patient's AM-PAC score and their current ADL deficits, it is recommended that the patient have 5-7 sessions per week of Occupational Therapy at d/c to increase the patient's independence. At this time, this patient demonstrates complex nursing, medical, and rehabilitative needs, and would benefit from intensive rehabilitation services upon discharge from the Inpatient setting. This patient demonstrates the ability to participate in and benefit from an intensive therapy program. Please see assessment section for further patient specific details. If patient discharges prior to next session this note will serve as a discharge summary. Please see below for the latest assessment towards goals. Patient Diagnosis(es): The primary encounter diagnosis was Closed fracture of right hip, initial encounter ([facility] [address] Diagnoses of Fall from slip, trip, or stumble, initial encounter and Closed right hip fracture, initial encounter [facility]) were also pertinent to this visit. Past Medical History:  has a past medical history of Arthritis, Chronic back pain, Hyperlipidemia, and Restless leg syndrome. Past Surgical History:  has a past surgical history that includes back surgery and Tubal ligation. Treatment Diagnosis: impaired ADL/fxl mobility    Assessment   Performance deficits / Impairments: Decreased functional mobility ; Decreased endurance;Decreased ADL status; Decreased safe awareness;Decreased high-level IADLs;Decreased balance;Decreased cognition  Assessment: 73 yo female admitted for a fall resulting in RIGHT hip femoral neck fracture with displacement. s/p 11/1 R MACI now WBAT. Per Dr. Isacc Maxwell no precautions. PMH: Chronic back pain, Restless leg syndrome. PTA, pt lives with family and fully IND no use of AD. Today, pt functioning below baseline limited by R hip pain and decreased endurance and safety awareness. Pt anxious. Pt required Min A for bed mobility, fxl tx, and short fxl mobility with RW with mod cues to slow pace and RW management. Distance limited d/t R hip pain. Pt required Total A LB dressing and set up grooming. Anticipate Max A LB and SBA UB ADL based on balance, endurance, cognition, pain and PLOF. Cont acute OT to address above deficits. Pt is a fall risk with low AMPAC score demonstrating need for further OT at mod-high pace 5-7x/week. Treatment Diagnosis: impaired ADL/fxl mobility  Prognosis: Fair  Decision Making: Medium Complexity  REQUIRES OT FOLLOW-UP: Yes  Activity Tolerance  Activity Tolerance: Patient limited by pain        Plan   Occupational Therapy Plan  Times Per Week: 5  Current Treatment Recommendations: Strengthening, ROM, Balance training, Functional mobility training, Endurance training, Pain management, Safety education & training, Self-Care / ADL, Home management training, Modalities, Positioning, Patient/Caregiver education & training     Restrictions  Restrictions/Precautions  Restrictions/Precautions: Weight Bearing, Fall Risk  Lower Extremity Weight Bearing Restrictions  Right Lower Extremity Weight Bearing: Weight Bearing As Tolerated  Position Activity Restriction  Other position/activity restrictions: WBAT no precautions per Dr Hidalgo Amen: Yes  Patient assessed for rehabilitation services?: Yes  Additional Pertinent Hx: 73 yo female admitted for a fall resulting in RIGHT hip femoral neck fracture with displacement.  s/p 11/1 R MACI now WBAT. Per Dr. Orin Vazquez no precautions. PMH: Chronic back pain, Restless leg syndrome  Family / Caregiver Present: No  Referring Practitioner: Orin Vazquez  Diagnosis: Fall  Subjective  Subjective: Pt resting in bed upon arrival and agreeable to OT/PT eval. Pt with mild R hip pain at rest, significant with weight bearing. Pt anxious at times  General Comment  Comments: RN ok to see       Social/Functional History  Social/Functional History  Lives With: Daughter (son in law and grand children. family works during the day)  Type of Home: [place]: Two level, Bed/Bath upstairs  Bathroom Shower/Tub: Tub/Shower unit  Bathroom Toilet: Standard  Has the patient had two or more falls in the past year or any fall with injury in the past year?:  (fall brought in here)  ADL Assistance: Independent  Homemaking Assistance: Independent (shares)  Ambulation Assistance: Independent  Transfer Assistance: Independent  Active : Yes       Objective         Observation/Palpation  Observation: R lateral hip dressing - dry and intact  Safety Devices  Type of Devices: Call light within reach; Chair alarm in place;Gait belt;Patient at risk for falls;Nurse notified; Left in chair Brian Smith          AROM: Within functional limits  PROM: Within functional limits  Strength: Within functional limits  Coordination: Within functional limits  Tone: Normal    ADL  Grooming: Setup  Grooming Skilled Clinical Factors: seated face washing  LE Dressing: Dependent/Total  LE Dressing Skilled Clinical Factors: doffing/donning socks. donning adilia hose  Additional Comments: Anticipate Max A LB and SBA UB ADL based on balance, endurance, cognition, pain and PLOF       Activity Tolerance  Activity Tolerance: Patient tolerated treatment well;Patient limited by pain    Bed mobility  Supine to Sit: Minimal assistance  Scooting: Minimal assistance  Bed Mobility Comments: HOB elevated. in time.  to the Left                   Sitting balance: brief at EOB in prep for tx with SBA    Transfers  Sit to stand: Minimal assistance  Stand to sit: Minimal assistance  Transfer Comments: RW. cues hand placement                 Standing balance: Min A with BUE on RW PRN tx                     Fxl mobility: Min A with RW EOB>recliner short distance limited d/t significant R hip pain. cues for slowing pace and RW management    Vision  Vision: Impaired  Vision Exceptions: Wears glasses for reading  Hearing  Hearing: Within functional limits    Cognition  Overall Cognitive Status: Exceptions  Following Commands:  Follows multistep commands with increased time  Attention Span: Attends with cues to redirect  Safety Judgement: Decreased awareness of need for safety;Decreased awareness of need for assistance  Insights: Decreased awareness of deficits  Initiation: Requires cues for some  Sequencing: Requires cues for some  Orientation  Overall Orientation Status: Within Functional Limits                    Education Given To: Patient  Education Provided: Role of Therapy;Plan of Care;Transfer Training;Precautions  Education Method: Demonstration;Verbal  Barriers to Learning: None  Education Outcome: Demonstrated understanding;Verbalized understanding;Continued education needed                      AM-PAC Score        AM-[facility] Inpatient Daily Activity Raw Score: 16 (11/02/22 0841)  AM-PAC Inpatient ADL T-Scale Score : 35.96 (11/02/22 0841)  ADL Inpatient CMS 0-100% Score: 53.32 (11/02/22 0841)  ADL Inpatient CMS G-Code Modifier : CK (11/02/22 0841)    Goals  Short Term Goals  Time Frame for Short Term Goals: prior to d/c  Short Term Goal 1: toileting Min A  Short Term Goal 2: LB dressing with AE as needed Min A  Short Term Goal 3: UB bathing/dressing seated set up  Short Term Goal 4: tolerate 3 min fxl standing task CGA  Short Term Goal 5: fxl tx and mobility with RW household distances CGA  Patient Goals   Patient goals : improve hip pain and return home       Therapy Time   Individual Concurrent Group Co-treatment   Time In 0750         Time Out 0830         Minutes 40         Timed Code Treatment Minutes: 25 Minutes (15 eval. 10 ADL 15 TA)       KATHY Ahumada, OTR/L

## 2022-11-02 NOTE — DISCHARGE INSTR - COC
[facility]) Continuity of Care Form    Patient Name:  Morgan Lizama  : 1950    MRN:  [id number]    Admit date:  10/31/2022  Discharge date:  ***    Code Status Order: Full Code  Advance Directives: {YES OR NO:}    Admitting Physician: Elida Cortes MD  PCP: Mitch Brito MD    Discharging Nurse: [facility] Unit/Room#: M1A-4500/3119-01  Discharging Unit Phone Number: ***    Emergency Contact:        Past Surgical History:  Past Surgical History:   Procedure Laterality Date    BACK SURGERY      x 5 disc surgeries    TUBAL LIGATION         Immunization History:   Immunization History   Administered Date(s) Administered    COVID-19, PFIZER PURPLE top, DILUTE for use, (age 15 y+), 30mcg/0.3mL 2022       Active Problems:  Principal Problem:    Closed right hip fracture, initial encounter ([facility] [address]  Active Problems:    Closed fracture of right hip ([facility] [address]  Resolved Problems:    * No resolved hospital problems. *      Isolation/Infection:       Nurse Assessment:  Last Vital Signs:/69   Pulse 76   Temp 97.8 °F (36.6 °C) (Oral)   Resp 18   Wt 138 lb 3.7 oz (62.7 kg)   SpO2 94%   BMI 19.83 kg/m²   Last documented pain score (0-10 scale): Pain Level: 8  Last Weight:   Wt Readings from Last 1 Encounters:   10/31/22 138 lb 3.7 oz (62.7 kg)     Mental Status:  {IP PT MENTAL STATUS:}     IV Access:  { NARINDER IV ACCESS:949656676}    Nursing Mobility/ADLs:  Walking   {CHP DME GQRX:602403328}  Transfer  {CHP DME LVVI:569056639}  Bathing  {CHP DME QXUB:351065224}  Dressing  {CHP DME OVRQ:864668217}  Toileting  {CHP DME VZZW:689045749}  Feeding  {CHP DME SUMANTH:212627058}  Med Admin  {CHP DME BPPX:661862790}  Med Delivery   { NARINDER MED Delivery:545631329}    Wound Care Documentation and Therapy:  Keep glued Prineo dressing intact. DO NOT remove. This is waterproof for showering.  Doctor will evaluate wound at office visit 2 weeks after surgery to discuss removal. Elimination:  Urinary Catheter: {Urinary Catheter:601195081}   Colostomy/Ileostomy: {YES / KF:79139}  Continence: Bowel: {YES / SI:93639}  Bladder: {YES / VK:53165}  Date of Last BM: ***    Intake/Output Summary (Last 24 hours)     Intake/Output Summary (Last 24 hours) at 11/2/2022 0651  Last data filed at 11/2/2022 0631  Gross per 24 hour   Intake 1640 ml   Output 1000 ml   Net 640 ml     Safety Concerns:     [address] NARINDER Safety Concerns:436941984}    Impairments/Disabilities:      [address] NARINDER Impairments/Disabilities:998011431}    Nutrition Therapy:  Current Nutrition Therapy: ADULT DIET;  Regular  Routes of Feeding: {[facility] Other Feedings:985167909}  Liquids: {Slp liquid thickness:73938}  Daily Fluid Restriction: {CHP DME Yes amt example:110920791}  Last Modified Barium Swallow with Video (Video Swallowing Test): {Done Not Done CBJP:970246777}    Treatments at the Time of Hospital Discharge:   Respiratory Treatments: ***  Oxygen Therapy:  {Therapy; copd oxygen:37029}  Ventilator:    { CC Vent PAEC:978669257}    Lab orders for discharge:        Rehab Therapies: Physical Therapy, Occupational Therapy and nursing care  Weight Bearing Status/Restrictions: No weight bearing restrictions, anterior hip precautions, NO straight leg raises  Other Medical Equipment (for information only, NOT a DME order):  Rolling walker  Other Treatments: ASA 81mg twice at day for 30 days for DVT prophylaxis , bilateral knee high JOSIE hose for 2 weeks after surgery    Patient's personal belongings (please select all that are sent with patient):  {[facility] DME Belongings:867973638}    RN SIGNATURE:  {Esignature:068219090}    PHYSICIAN SECTION    Prognosis: Good    Condition at Discharge: Stable    Rehab Potential (if transferring to Rehab): Good    Physician Certification: I certify the above orders, information, and transfer of Vickie Ward is necessary for the continuing treatment of the diagnosis listed and that he requires Skilled Nursing Facility for less 30 days. Update Admission H&P: No change in H&P    PHYSICIAN SIGNATURE:  Electronically signed by JES Parker CNP on 11/2/22 at 6:52 AM EDT/Dr Maria D Calvert in office 2 weeks after surgery   [facility] and Sports Medicine, [address] [place] [address], [address] ,   [phone]    CASE MANAGEMENT/SOCIAL WORK SECTION    Inpatient Status Date: ***    [facility] Readmission Risk Assessment Score:    Discharging to Facility/ Agency   Name:   Address:  Phone:  Fax:    Dialysis Facility (if applicable)   Name:  Address:  Dialysis Schedule:  Phone:  Fax:    / signature: {Esignature:877935758}  Activity:  Elevate your leg if swelling occurs in your ankle. Use elastic wraps/hose until swelling decreases. Continue the exercise program as prescribed by physical therapists. Take frequent walks. Use walker, crutches, or cane with weight bearing instructions as indicated by the physical therapists. Take rest periods often. Elevate leg during rest period. Avoid sitting in low chairs or toilets without raised seats. Keep knees apart. Sleep with a pillow between your legs. Do not cross your legs, especially when putting on shoes and socks. Wound Care:  Cover the wound with a sterile gauze dressing and change daily as long as there is drainage. Do not scrub wound. Pat it dry with a soft towel. Dont apply any lotions or creams to your wound. Check the incision every day for redness, swelling, or increase in drainage. Diet:  You can resume your normal diet. There are no limits on your diet due to your surgery. Pain pills and activity changes may lead to constipation. To prevent this, use prune juice or bran cereals liberally. You may need to use a laxative such as Dulcolax, Senokot, or Milk of Magnesia. Drink plenty of fluids. Medications: Take pain pills if needed to maintain comfort.   Never drive while taking pain medicine. Avoid over the counter medications until checking with your doctor. Resume previous medications as instructed by your doctor. You will be on Aspirin or Eliquis  for 30 days only. Stay off other anti-inflammatory medications (except Celebrex) while on blood thinners  Call Your Doctor If:  You have increased pain not controlled by medications. Excessive swelling in your ankle. You develop numbness, tingling, or decreased movement. You have a fever greater than 100 degrees for a day or over 101 degrees at any one time. Your wound becomes more reddened, starts draining, or opens. If you fall. You have any questions about your recovery. Inform your family doctor/dentist or any other doctor who cares for you in the future that you have a joint replacement. You may need antibiotics for dental or surgical procedures if there is any evidence of infection present. If you have required the use of insulin to control your blood sugar after surgery, follow up with your family doctor. Call your surgeons office to schedule your appointment to be seen after surgery. Make your appointment to continue physical therapy per doctors orders. Smoking cessation assistance can be obtained from your family doctor or by [address] @ [phone]    _______________________________   _____   _______________________  ____                Patient Signature              Date      Witness                               Date          Anterior Approach  The main positions and movements to avoid after an anterior approach include bending the hip back, turning your hip and leg out, or spreading your leg outward. Don't stretch your hip back. Walk with short steps. Taking a longer step when leading with your nonoperated hip stretches the surgical hip back. Don't kneel only on one knee. Kneeling only on the surgical hip stretches the hip back. Use both knees when you must kneel down. Don't turn your foot out. Place a pillow next to your hip and leg to keep your leg from turning or rolling out while lying on your back in bed. Don't twist your body away from your operated hip. This means don't stand with your toes pointed out. Keep the toes of your affected leg pointed forward when you stand, sit, or walk. If you turn your body away from your surgical hip without pivoting your foot, your hip will be placed in an unsafe position. Remember to lift and turn your foot as you turn. Don't swing your leg outward away from your body. This means scooting to the side in bed by supporting your surgical leg. Don't put your leg in a straddling position, as though you are mounting a horse. This means preventing your leg from bending up and out when getting in or out of the bathtub.  Instead, hold your leg, and lift it straight up and over the edge of the tub

## 2022-11-02 NOTE — PROGRESS NOTES
4 Eyes Skin Assessment     NAME:  Jaclyn Velarde  YOB: 1950  MEDICAL RECORD NUMBER:  [id number]    The patient is being assess for  Admission    I agree that 2 RN's have performed a thorough Head to Toe Skin Assessment on the patient. ALL assessment sites listed below have been assessed. Areas assessed by both nurses:    Head, Face, Ears, Shoulders, Back, Chest, Arms, Elbows, Hands, Sacrum. Buttock, Coccyx, Ischium, and Legs. Feet and Heels        Does the Patient have a Wound? Yes wound(s) were present on assessment.  LDA wound assessment was Initiated and completed   Generalized bruising noted throughout and surgical incision to right hip       Cash Prevention initiated:  Yes   Wound Care Orders initiated:  Yes    Pressure Injury (Stage 3,4, Unstageable, DTI, NWPT, and Complex wounds) if present place referral/consult order under [de-identified] NA    New and Established Ostomies if present place consult order under : NA      Nurse 1 eSignature: Electronically signed by Matthew Cardenas RN on 11/2/22 at 5:37 PM EDT    **SHARE this note so that the co-signing nurse is able to place an eSignature**    Nurse 2 eSignature: Electronically signed by Alva Sprague RN on 11/2/22 at 5:46 PM EDT

## 2022-11-02 NOTE — PROGRESS NOTES
Ethnicity  \"Are you of , /a, or [place] citizen origin? \"  Check all that apply:  [x] A. No, not of , /a, or [place] Origin  [] B.  Yes, [place], [place] American, Chicano/a  [] C.  Yes, [address] [place] [address]  [] D.  Yes, [place]  [] E.  Yes, another , , or [place] citizen origin  [] X. Patient unable to respond    Race  \"What is your race? \"  Check all that apply:  [x] A. White  [] B. Black or   [] C. American [place] [facility] or [place] Native  [] D.  [place]  [] E. [place]  [] F. Albanian  [] G. [place]  [] Breann Forester  [] I. [place]  [] J.  Other   [] K.   [] L. Sudanese or [place]  [] M. Norwegian  [] N. Other [place]  [] X. Patient unable to respond    High Risk Drug Classes:  Use and Indication    Is taking: Check if the pt is taking any medications by pharmacological classification, not how it is used, in the following classes  Indication noted: If column 1 is checked, check if there is an indication noted for all meds in the drug class Is taking  (check all that apply) Indication noted (check all that apply)   Antipsychotic [] []   Anticoagulant [x] [x]   Antibiotic [] []   Opioid [x] [x]   Antiplatelet [] []   Hypoglycemic (including insulin) [] []   None of the above []     Special Treatments, Procedures, and Programs    Check all of the following treatments, procedures, and programs that apply on admission. On admission (check all that apply)   Cancer Treatments   A1. Chemotherapy []           A2. IV []           A3. Oral []           A10. Other []   B1. Radiation []   Respiratory Therapies   C1. Oxygen Therapy []           C2. Continuous []           C3. Intermittent []           C4. High-concentration []   D1. Suctioning []           D2. Scheduled []           D3. As needed []   E1. Tracheostomy Care []   F1.  Invasive Mechanical Ventilator (ventilator or respirator) []   G1. Non-invasive Mechanical Ventilator []           G2. BiPAP [] G3. CPAP []   Other   H1. IV Medications []           H2. Vasoactive medications []           H3. Antibiotics []           H4. Anticoagulation []           H10. Other []   I1. Transfusions []   J1. Dialysis []           J2. Hemodialysis []           J3. Peritoneal dialysis []   O1. IV access []           O2. Peripheral [x]           O3. Midline []           O4.  Central (PICC, tunneled, port) []      None of the above (select if no Cancer, Respiratory, or Other boxes are checked) []

## 2022-11-02 NOTE — ACP (ADVANCE CARE PLANNING)
Advance Care Planning     Advance Care Planning Activator (Inpatient)  Conversation Note      Date of ACP Conversation: 11/2/2022     Conversation Conducted with: Patient with Decision Making Capacity    ACP Activator: [address] Decision Maker:     Current Designated Health Care Decision Maker:     Primary Decision Maker: Kolby Fink Child - [phone]    Secondary Decision Maker: Semaj Jay Child - [phone]  Click here to complete Healthcare Decision Makers including section of the Healthcare Decision Maker Relationship (ie \"Primary\")      Care Preferences    Ventilation: \"If you were in your present state of health and suddenly became very ill and were unable to breathe on your own, what would your preference be about the use of a ventilator (breathing machine) if it were available to you? \"      Would the patient desire the use of ventilator (breathing machine)?: yes    \"If your health worsens and it becomes clear that your chance of recovery is unlikely, what would your preference be about the use of a ventilator (breathing machine) if it were available to you? \"     Would the patient desire the use of ventilator (breathing machine)?: No      Resuscitation  \"CPR works best to restart the heart when there is a sudden event, like a heart attack, in someone who is otherwise healthy. Unfortunately, CPR does not typically restart the heart for people who have serious health conditions or who are very sick. \"    \"In the event your heart stopped as a result of an underlying serious health condition, would you want attempts to be made to restart your heart (answer \"yes\" for attempt to resuscitate) or would you prefer a natural death (answer \"no\" for do not attempt to resuscitate)? \" yes       [] Yes   [] No   Educated Patient / Donnguyễn Pina regarding differences between Advance Directives and portable DNR orders.     Length of ACP Conversation in minutes:      Marino Garcia Outcomes:   A[x]CP discussion completed  [] Existing advance directive reviewed with patient; no changes to patient's previously recorded wishes  [] New Advance Directive completed  [] Portable Do Not Rescitate prepared for Provider review and signature  [] POLST/POST/MOLST/MOST prepared for Provider review and signature      Follow-up plan:    [] Schedule follow-up conversation to continue planning  [] Referred individual to Provider for additional questions/concerns   [] Advised patient/agent/surrogate to review completed ACP document and update if needed with changes in condition, patient preferences or care setting    [x] This note routed to one or more involved healthcare providers        Electronically signed by Derick Mishra on 11/2/2022 at 11:14 AM  #[phone]

## 2022-11-02 NOTE — PROGRESS NOTES
Discharge:    Discharge information, medications, and follow-up instructions reviewed with VENANCIO Davies at [facility]. Pt transferred to room 3273.

## 2022-11-02 NOTE — PROGRESS NOTES
[facility] Orthopedic Surgery   Progress Note      S/P :  SUBJECTIVE  in bed. Alert and oriented. Pain is   described in right hip and with the intensity of moderate. Pain is described as aching, tender. Pain in right anterior thigh as well, \"like a spasm\"      OBJECTIVE              Physical                      VITALS:  /69   Pulse 73   Temp 97.9 °F (36.6 °C) (Oral)   Resp 18   Wt 138 lb 3.7 oz (62.7 kg)   SpO2 95%   BMI 19.83 kg/m²                     MUSCULOSKELETAL:  right foot NVI. Wiggles toes to command. Able to plantarflex and dorsiflex ankle Pedal pulses are palpable. NEUROLOGIC:                                  Sensory:  Touch:  Right Lower Extremity:  normal                                                 Surgical wound appears clean and dry right hip with Prineo dressing Ice pack on .      Data       CBC:   Lab Results   Component Value Date/Time    WBC 11.6 11/02/2022 04:30 AM    RBC 3.50 11/02/2022 04:30 AM    HGB 11.4 11/02/2022 04:30 AM    HCT 33.5 11/02/2022 04:30 AM    MCV 95.7 11/02/2022 04:30 AM    MCH 32.6 11/02/2022 04:30 AM    MCHC 34.1 11/02/2022 04:30 AM    RDW 13.6 11/02/2022 04:30 AM     11/02/2022 04:30 AM    MPV 9.4 11/02/2022 04:30 AM        WBC:    Lab Results   Component Value Date/Time    WBC 11.6 11/02/2022 04:30 AM        Hemoglobin/Hematocrit:    Lab Results   Component Value Date/Time    HGB 11.4 11/02/2022 04:30 AM    HCT 33.5 11/02/2022 04:30 AM        PT/INR:    Lab Results   Component Value Date/Time    PROTIME 14.3 10/31/2022 12:02 PM    INR 1.12 10/31/2022 12:02 PM              Current Inpatient Medications             Current Facility-Administered Medications: aspirin chewable tablet 81 mg, 81 mg, Oral, BID  oxyCODONE (ROXICODONE) immediate release tablet 5 mg, 5 mg, Oral, Q4H PRN  HYDROmorphone (DILAUDID) injection 0.5 mg, 0.5 mg, IntraVENous, Q3H PRN **OR** HYDROmorphone (DILAUDID) injection 1 mg, 1 mg, IntraVENous, Q3H PRN  LORazepam (ATIVAN) tablet 2 mg, 2 mg, Oral, Q6H PRN  atorvastatin (LIPITOR) tablet 10 mg, 10 mg, Oral, Daily  sodium chloride flush 0.9 % injection 5-40 mL, 5-40 mL, IntraVENous, 2 times per day  sodium chloride flush 0.9 % injection 5-40 mL, 5-40 mL, IntraVENous, PRN  0.9 % sodium chloride infusion, , IntraVENous, PRN  ondansetron (ZOFRAN-ODT) disintegrating tablet 4 mg, 4 mg, Oral, Q8H PRN **OR** ondansetron (ZOFRAN) injection 4 mg, 4 mg, IntraVENous, Q6H PRN  polyethylene glycol (GLYCOLAX) packet 17 g, 17 g, Oral, Daily PRN  lactated ringers infusion, , IntraVENous, Continuous    ASSESSMENT AND PLAN      Post right MACI for femoral neck fx per DR Dozier Sides yesterday, Stable exam  Pt with right leg spasms. Asked RN to give pt her Ativan po. Pt reported to me on admission she takes her allowed daily ativan all at bedtime, 8mg. Currently med is ordered as directed in Epic , 2mg q6h. Will observe. DVT prophylaxis ordered, ASA 81mg twice at day for 30 days for DVT prophylaxis   PT OT for ADL's and ambulation as tolerated, WBAT NO hip precautions per Dr Mario report to me today  SS for DC planning, ? 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      JES Sanchez - CNP  11/2/2022  10:08 AM

## 2022-11-02 NOTE — ANESTHESIA POSTPROCEDURE EVALUATION
[facility] Department of Anesthesiology  Post-Anesthesia Note       Name:  Sosa Edmondson                                  Age:  74 y.o. MRN:  [id number]     Last Vitals & Oxygen Saturation: /77   Pulse 91   Temp 97.3 °F (36.3 °C) (Oral)   Resp 22   Wt 138 lb 3.7 oz (62.7 kg)   SpO2 90%   BMI 19.83 kg/m²   Patient Vitals for the past 4 hrs:   BP Temp Temp src Pulse Resp SpO2   11/01/22 1918 119/77 97.3 °F (36.3 °C) Oral 91 22 90 %   11/01/22 1825 125/66 97.4 °F (36.3 °C) Oral 78 23 93 %   11/01/22 1759 117/83 98.1 °F (36.7 °C) Temporal (!) 105 19 92 %   11/01/22 1745 (!) 148/80 -- -- 89 10 93 %   11/01/22 1740 -- -- -- 99 -- --   11/01/22 1735 (!) 129/107 -- -- (!) 104 19 --   11/01/22 1730 131/75 -- -- (!) 102 14 --   11/01/22 1725 (!) 140/74 -- -- 97 10 100 %   11/01/22 1724 138/73 98 °F (36.7 °C) Temporal (!) 103 -- 100 %       Level of consciousness:  Awake, alert to baseline    Respiratory: Respirations easy, no distress. Stable. Cardiovascular: Hemodynamically stable. Hydration: Adequate. PONV: Adequately managed. Post-op pain: Adequately controlled. Post-op assessment: Tolerated anesthetic well without complication. Complications:  None.     Luis F Hartman MD  November 1, 2022   8:26 PM

## 2022-11-02 NOTE — PLAN OF CARE
Problem: Discharge Planning  Goal: Discharge to home or other facility with appropriate resources  Outcome: Progressing  Flowsheets (Taken 11/2/2022 1100)  Discharge to home or other facility with appropriate resources:   Identify barriers to discharge with patient and caregiver   Identify discharge learning needs (meds, wound care, etc)   Refer to discharge planning if patient needs post-hospital services based on physician order or complex needs related to functional status, cognitive ability or social support system     Problem: Pain  Goal: Verbalizes/displays adequate comfort level or baseline comfort level  Outcome: Progressing  Flowsheets (Taken 11/2/2022 1100)  Verbalizes/displays adequate comfort level or baseline comfort level:   Encourage patient to monitor pain and request assistance   Administer analgesics based on type and severity of pain and evaluate response   Assess pain using appropriate pain scale   Implement non-pharmacological measures as appropriate and evaluate response     Problem: Musculoskeletal - Adult  Goal: Return mobility to safest level of function  Outcome: Progressing  Flowsheets (Taken 11/2/2022 1100)  Return Mobility to Safest Level of Function:   Assess patient stability and activity tolerance for standing, transferring and ambulating with or without assistive devices   Assist with transfers and ambulation using safe patient handling equipment as needed   Ensure adequate protection for wounds/incisions during mobilization   Obtain physical therapy/occupational therapy consults as needed   Instruct patient/family in ordered activity level  Goal: Maintain proper alignment of affected body part  Outcome: Progressing  Flowsheets (Taken 11/2/2022 1100)  Maintain proper alignment of affected body part:   Support and protect limb and body alignment per provider's orders   Instruct and reinforce with patient and family use of appropriate assistive device and precautions (e.g. spinal or hip dislocation precautions)  Goal: Return ADL status to a safe level of function  Outcome: Progressing  Flowsheets (Taken 11/2/2022 1100)  Return ADL Status to a Safe Level of Function:   Administer medication as ordered   Obtain physical therapy/occupational therapy consults as needed   Assess activities of daily living deficits and provide assistive devices as needed   Assist and instruct patient to increase activity and self care as tolerated     Problem: Infection - Adult  Goal: Absence of infection during hospitalization  Outcome: Progressing  Flowsheets (Taken 11/2/2022 1100)  Absence of infection during hospitalization:   Assess and monitor for signs and symptoms of infection   Monitor lab/diagnostic results   Administer medications as ordered  Goal: Absence of infection at discharge  Outcome: Progressing  Flowsheets (Taken 11/2/2022 1100)  Absence of infection at discharge:   Assess and monitor for signs and symptoms of infection   Monitor lab/diagnostic results   Administer medications as ordered     Problem: Safety - Adult  Goal: Free from fall injury  Outcome: Progressing  Flowsheets (Taken 11/2/2022 1100)  Free From Fall Injury: Instruct family/caregiver on patient safety     Problem: Chronic Conditions and Co-morbidities  Goal: Patient's chronic conditions and co-morbidity symptoms are monitored and maintained or improved  Outcome: Progressing  Flowsheets (Taken 11/2/2022 1100)  Care Plan - Patient's Chronic Conditions and Co-Morbidity Symptoms are Monitored and Maintained or Improved:   Monitor and assess patient's chronic conditions and comorbid symptoms for stability, deterioration, or improvement   Collaborate with multidisciplinary team to address chronic and comorbid conditions and prevent exacerbation or deterioration   Update acute care plan with appropriate goals if chronic or comorbid symptoms are exacerbated and prevent overall improvement and discharge     Problem: Neurosensory - Adult  Goal: Achieves stable or improved neurological status  Outcome: Progressing  Flowsheets (Taken 11/2/2022 1100)  Achieves stable or improved neurological status:   Assess for and report changes in neurological status   Monitor temperature, glucose, and sodium.  Initiate appropriate interventions as ordered     Problem: Cardiovascular - Adult  Goal: Maintains optimal cardiac output and hemodynamic stability  Outcome: Progressing  Flowsheets (Taken 11/2/2022 1100)  Maintains optimal cardiac output and hemodynamic stability:   Monitor blood pressure and heart rate   Monitor urine output and notify Licensed Independent Practitioner for values outside of normal range   Assess for signs of decreased cardiac output     Problem: Skin/Tissue Integrity - Adult  Goal: Skin integrity remains intact  Outcome: Progressing  Flowsheets (Taken 11/2/2022 1100)  Skin Integrity Remains Intact: Monitor for areas of redness and/or skin breakdown  Goal: Incisions, wounds, or drain sites healing without S/S of infection  Outcome: Progressing  Flowsheets (Taken 11/2/2022 1100)  Incisions, Wounds, or Drain Sites Healing Without Sign and Symptoms of Infection:   TWICE DAILY: Assess and document skin integrity   TWICE DAILY: Assess and document dressing/incision, wound bed, drain sites and surrounding tissue     Problem: Metabolic/Fluid and Electrolytes - Adult  Goal: Glucose maintained within prescribed range  Outcome: Progressing  Flowsheets (Taken 11/2/2022 1100)  Glucose maintained within prescribed range:   Monitor blood glucose as ordered   Administer ordered medications to maintain glucose within target range   Assess for signs and symptoms of hyperglycemia and hypoglycemia

## 2022-11-02 NOTE — PROGRESS NOTES
Physical Therapy  Facility/Department: [address] ORTHOPEDICS  Physical Therapy Initial Assessment    Name: Nancy Amos  : 1950  MRN: [id number]  Date of Service: 2022    Assessment / Discharge Recommendations:  -right Hip fracture >>>THR   -good start with initial efforts mobilizing from bed to ambulate a few steps to recliner  -anticipate need for continued PTOT and nursing care in a skilled setting prior to home with family assist   -anticipate able to tolerate and benefit from a high frequency of sessions  Nancy Amos scored a  on the AM-PAC short mobility form. Current research shows that an AM-PAC score of 17 or less is typically not associated with a discharge to the patient's home setting. Based on the patient's AM-PAC score and their current functional mobility deficits, it is recommended that the patient have 5-7 sessions per week of Physical Therapy at d/c to increase the patient's independence. At this time, this patient demonstrates complex nursing, medical, and rehabilitative needs, and would benefit from intensive rehabilitation services upon discharge from the Inpatient setting. Patient Diagnosis(es): The primary encounter diagnosis was Closed fracture of right hip, initial encounter ([facility] [address] Diagnoses of Fall from slip, trip, or stumble, initial encounter and Closed right hip fracture, initial encounter [facility]) were also pertinent to this visit. Past Medical History:  has a past medical history of Arthritis, Chronic back pain, Hyperlipidemia, and Restless leg syndrome. Past Surgical History:  has a past surgical history that includes back surgery and Tubal ligation. Body Structures, Functions, Activity Limitations Requiring Skilled Therapeutic Intervention: Decreased functional mobility ; Decreased ADL status; Increased pain;Decreased balance  Therapy Prognosis: Good  Decision Making: Medium Complexity  Requires PT Follow-Up: Yes  Activity Tolerance  Activity Tolerance: Patient tolerated treatment well;Patient limited by pain     Plan   Physcial Therapy Plan  Current Treatment Recommendations: Strengthening, ROM, Functional mobility training, Transfer training, ADL/Self-care training, Gait training, Positioning, Modalities, Therapeutic activities, Patient/Caregiver education & training  Additional Comments: qdx1-2 then 3-5 while on acute floor  Safety Devices  Type of Devices: Call light within reach, Chair alarm in place, Gait belt, Patient at risk for falls, Nurse notified, Left in chair Martine Cones)     Restrictions  Restrictions/Precautions  Restrictions/Precautions: Weight Bearing, Fall Risk  Lower Extremity Weight Bearing Restrictions  Right Lower Extremity Weight Bearing: Weight Bearing As Tolerated  Position Activity Restriction  Other position/activity restrictions: WBAT no precautions per Dr Buckner Genre: Yes  Patient assessed for rehabilitation services?: Yes  Additional Pertinent Hx: here due to fall in bathroom at home - found to have right hip fracture  Response To Previous Treatment: Not applicable  Family / Caregiver Present: No  Follows Commands: Within Functional Limits  Subjective  Subjective: arrived to room along with OT to patient resting in bed - nursing in to complete morning medications and remove Duvall catheter  - she is alert oriented and agreeable to PT OT assessments as long as she gets breakfast  - pain at rest is minimal  Social/Functional History  Social/Functional History  Lives With: Daughter (son in law and grand children.  family works during the day)  Type of Home: [employer] Layout: Two level, Bed/Bath upstairs  Bathroom Shower/Tub: Tub/Shower unit  Bathroom Toilet: Standard  Has the patient had two or more falls in the past year or any fall with injury in the past year?:  (fall brought in here)  ADL Assistance: Independent  Homemaking Assistance: Independent (shares)  Ambulation Assistance: Independent  Transfer Assistance: Independent  Active : Yes  Vision/Hearing  Vision  Vision: Impaired  Vision Exceptions: Wears glasses for reading  Hearing  Hearing: Within functional limits    Cognition   Orientation  Overall Orientation Status: Within Functional Limits     Objective   Observation/Palpation  Observation: R lateral hip dressing - dry and intact  Gross Assessment  Tone: Normal  Sensation:  (appears grossly nl)   Strength Other  Other: grossly wfl in non-surgical limbs  Bed mobility  Supine to Sit: Minimal assistance  Scooting: Minimal assistance  Transfers  Sit to Stand: Minimal Assistance  Stand to Sit: Minimal Assistance  Ambulation  Surface: Level tile  Device: Rolling Walker  Assistance: Minimal assistance  Quality of Gait: short step to pattern leading with the right LE  - good heel contact  Distance: 5 feet (bed to recliner)  More Ambulation?: No  Stairs/Curb  Stairs?: No  Balance  Comments: midline in sitting and static stance on the walker with min assist for safety   -dynamic is fair on rolling walker with min assist (limited observation)     AM-PAC Score  AM-PAC Inpatient Mobility Raw Score : 11 (11/02/22 0826)  AM-PAC Inpatient T-Scale Score : 33.86 (11/02/22 0826)  Mobility Inpatient CMS 0-100% Score: 72.57 (11/02/22 0826)  Mobility Inpatient CMS G-Code Modifier : CL (11/02/22 5387)    Goals  Short Term Goals  Time Frame for Short Term Goals: 1-2 days  Short Term Goal 1: bed mobility at min assist  Short Term Goal 2: transfers at min assist  Short Term Goal 3: ambulation at min assist wbat rolling walker for room distances  Short Term Goal 4: exercises at min assist  Patient Goals   Patient Goals : pain relief and get home       Education  Patient Education  Education Given To: Patient  Education Provided: Role of Therapy;Plan of Care  Education Method: Verbal  Barriers to Learning: None  Education Outcome: Verbalized understanding;Continued education needed      Therapy Time Individual Concurrent Group Co-treatment   Time In 0750         Time Out 0830         Minutes [address] [facility] Deneen Willoughby, PT

## 2022-11-02 NOTE — PROGRESS NOTES
Pt resting in bed at beginning of shift. She c/o R hip pain rated 7/10 with movement. PRN Oxy administered. She denies having any nausea, numbness, or tingling. Pulses palpable, skin cool to touch. She has strong flexion in BLE. Duvall catheter removed at 0800, per order. Pt tolerated well. PT/OT in room to work with pt. Will continue to monitor and assess.

## 2022-11-02 NOTE — PROGRESS NOTES
Patient admitted to room 3273 per recliner. Patient was oriented to the Call Light, Phone, TV, Thermostat, Bed Controls, Bathroom and Emergency Cord. Patient verbalized and demonstrated understanding of all. Patient was also given an over view of Unit Routines for Acute Rehab, including what to wear for therapy. The patient's role in goal setting was reviewed along with an explanation of the Interdisciplinary Team meeting, the 's role in coordinating services and the Discharge Planning/Continuum of Care process. Patient Rights and Responsibilities were reviewed. Meal times were explained, including how to order food. The white board (used for communication) was pointed out emphasizing  the 3 hours/day Therapy Schedule (posted most evenings), the number (and process) for reporting grievances, and the Doctor's, Nurse's, and PCA's names. It was recommended that any family that will be care givers or any care givers the patient has, take part in therapy. There are no set visiting hours, and it was suggested that non-caregiver friends and family visitors come after therapy (at 4 PM or later) to allow patient to rest in between sessions.  Electronically signed by Wilton Dsouza RN, [address] [place] [address] on 11/2/22 at 5:00 PM EDT

## 2022-11-02 NOTE — PROGRESS NOTES
A complete drug regimen review was completed for this patient.      [x]  No clinically significant medication issue was identified    []   Yes, a clinically significant medication issue was identified     []  Adverse Drug Event:      []  Allergy:      []  Side Effect:      []  Ineffective Therapy:      []  Drug Interaction:     []  Duplicated Therapy:     []  Untreated Indication:      []  Non-adherence:     []  Other:       Electronically signed by Rm Vizcaino RN, CRRN on 11/2/22 at 5:00 PM EDT

## 2022-11-03 LAB
ANION GAP SERPL CALCULATED.3IONS-SCNC: 8 MMOL/L (ref 3–16)
BASOPHILS ABSOLUTE: 0 K/UL (ref 0–0.2)
BASOPHILS RELATIVE PERCENT: 0.2 %
BUN BLDV-MCNC: 19 MG/DL (ref 7–20)
CALCIUM SERPL-MCNC: 8.2 MG/DL (ref 8.3–10.6)
CHLORIDE BLD-SCNC: 102 MMOL/L (ref 99–110)
CO2: 29 MMOL/L (ref 21–32)
CREAT SERPL-MCNC: <0.5 MG/DL (ref 0.6–1.2)
EOSINOPHILS ABSOLUTE: 0 K/UL (ref 0–0.6)
EOSINOPHILS RELATIVE PERCENT: 0.3 %
GFR SERPL CREATININE-BSD FRML MDRD: >60 ML/MIN/{1.73_M2}
GLUCOSE BLD-MCNC: 100 MG/DL (ref 70–99)
HCT VFR BLD CALC: 30.5 % (ref 36–48)
HEMOGLOBIN: 10.3 G/DL (ref 12–16)
LYMPHOCYTES ABSOLUTE: 1.6 K/UL (ref 1–5.1)
LYMPHOCYTES RELATIVE PERCENT: 15 %
MAGNESIUM: 1.8 MG/DL (ref 1.8–2.4)
MCH RBC QN AUTO: 32.9 PG (ref 26–34)
MCHC RBC AUTO-ENTMCNC: 33.9 G/DL (ref 31–36)
MCV RBC AUTO: 96.9 FL (ref 80–100)
MONOCYTES ABSOLUTE: 0.7 K/UL (ref 0–1.3)
MONOCYTES RELATIVE PERCENT: 6.6 %
NEUTROPHILS ABSOLUTE: 8.1 K/UL (ref 1.7–7.7)
NEUTROPHILS RELATIVE PERCENT: 77.9 %
PDW BLD-RTO: 13.6 % (ref 12.4–15.4)
PLATELET # BLD: 142 K/UL (ref 135–450)
PMV BLD AUTO: 9.2 FL (ref 5–10.5)
POTASSIUM REFLEX MAGNESIUM: 4 MMOL/L (ref 3.5–5.1)
RBC # BLD: 3.14 M/UL (ref 4–5.2)
SODIUM BLD-SCNC: 139 MMOL/L (ref 136–145)
WBC # BLD: 10.3 K/UL (ref 4–11)

## 2022-11-03 PROCEDURE — 97162 PT EVAL MOD COMPLEX 30 MIN: CPT

## 2022-11-03 PROCEDURE — 36415 COLL VENOUS BLD VENIPUNCTURE: CPT

## 2022-11-03 PROCEDURE — 97535 SELF CARE MNGMENT TRAINING: CPT

## 2022-11-03 PROCEDURE — 97110 THERAPEUTIC EXERCISES: CPT

## 2022-11-03 PROCEDURE — 97166 OT EVAL MOD COMPLEX 45 MIN: CPT

## 2022-11-03 PROCEDURE — 94760 N-INVAS EAR/PLS OXIMETRY 1: CPT

## 2022-11-03 PROCEDURE — 85025 COMPLETE CBC W/AUTO DIFF WBC: CPT

## 2022-11-03 PROCEDURE — 97530 THERAPEUTIC ACTIVITIES: CPT

## 2022-11-03 PROCEDURE — 6360000002 HC RX W HCPCS: Performed by: PHYSICAL MEDICINE & REHABILITATION

## 2022-11-03 PROCEDURE — 80048 BASIC METABOLIC PNL TOTAL CA: CPT

## 2022-11-03 PROCEDURE — 83735 ASSAY OF MAGNESIUM: CPT

## 2022-11-03 PROCEDURE — 97116 GAIT TRAINING THERAPY: CPT

## 2022-11-03 PROCEDURE — 6370000000 HC RX 637 (ALT 250 FOR IP): Performed by: PHYSICAL MEDICINE & REHABILITATION

## 2022-11-03 PROCEDURE — 1280000000 HC REHAB R&B

## 2022-11-03 RX ADMIN — LORAZEPAM 2 MG: 1 TABLET ORAL at 15:17

## 2022-11-03 RX ADMIN — OXYCODONE HYDROCHLORIDE 10 MG: 10 TABLET ORAL at 23:43

## 2022-11-03 RX ADMIN — LORAZEPAM 2 MG: 1 TABLET ORAL at 21:38

## 2022-11-03 RX ADMIN — OXYCODONE HYDROCHLORIDE 10 MG: 10 TABLET ORAL at 19:42

## 2022-11-03 RX ADMIN — ASPIRIN 81 MG 81 MG: 81 TABLET ORAL at 06:51

## 2022-11-03 RX ADMIN — OXYCODONE HYDROCHLORIDE 10 MG: 10 TABLET ORAL at 11:13

## 2022-11-03 RX ADMIN — ENOXAPARIN SODIUM 40 MG: 100 INJECTION SUBCUTANEOUS at 17:29

## 2022-11-03 RX ADMIN — OXYCODONE HYDROCHLORIDE 10 MG: 10 TABLET ORAL at 02:31

## 2022-11-03 RX ADMIN — LORAZEPAM 2 MG: 1 TABLET ORAL at 06:52

## 2022-11-03 RX ADMIN — POLYETHYLENE GLYCOL 3350 17 G: 17 POWDER, FOR SOLUTION ORAL at 08:57

## 2022-11-03 RX ADMIN — OXYCODONE HYDROCHLORIDE 10 MG: 10 TABLET ORAL at 06:51

## 2022-11-03 RX ADMIN — OXYCODONE HYDROCHLORIDE 10 MG: 10 TABLET ORAL at 15:16

## 2022-11-03 RX ADMIN — ASPIRIN 81 MG 81 MG: 81 TABLET ORAL at 19:42

## 2022-11-03 RX ADMIN — ATORVASTATIN CALCIUM 10 MG: 10 TABLET, FILM COATED ORAL at 08:57

## 2022-11-03 RX ADMIN — SENNOSIDES AND DOCUSATE SODIUM 2 TABLET: 50; 8.6 TABLET ORAL at 19:42

## 2022-11-03 ASSESSMENT — PAIN - FUNCTIONAL ASSESSMENT
PAIN_FUNCTIONAL_ASSESSMENT: ACTIVITIES ARE NOT PREVENTED

## 2022-11-03 ASSESSMENT — PAIN DESCRIPTION - LOCATION
LOCATION: HIP
LOCATION: LEG
LOCATION: HIP

## 2022-11-03 ASSESSMENT — PAIN DESCRIPTION - ORIENTATION
ORIENTATION: RIGHT
ORIENTATION: LEFT
ORIENTATION: RIGHT

## 2022-11-03 ASSESSMENT — PAIN SCALES - GENERAL
PAINLEVEL_OUTOF10: 7
PAINLEVEL_OUTOF10: 4
PAINLEVEL_OUTOF10: 5
PAINLEVEL_OUTOF10: 5
PAINLEVEL_OUTOF10: 7
PAINLEVEL_OUTOF10: 7
PAINLEVEL_OUTOF10: 6
PAINLEVEL_OUTOF10: 7
PAINLEVEL_OUTOF10: 7
PAINLEVEL_OUTOF10: 5

## 2022-11-03 ASSESSMENT — PAIN DESCRIPTION - PAIN TYPE
TYPE: SURGICAL PAIN

## 2022-11-03 ASSESSMENT — PAIN DESCRIPTION - ONSET
ONSET: ON-GOING

## 2022-11-03 ASSESSMENT — PAIN DESCRIPTION - FREQUENCY
FREQUENCY: CONTINUOUS

## 2022-11-03 ASSESSMENT — PAIN DESCRIPTION - DESCRIPTORS
DESCRIPTORS: ACHING;SHOOTING;SHARP
DESCRIPTORS: ACHING
DESCRIPTORS: ACHING;DISCOMFORT;SORE
DESCRIPTORS: ACHING;DISCOMFORT
DESCRIPTORS: ACHING;DISCOMFORT;SORE

## 2022-11-03 NOTE — CONSULTS
Nutrition Assessment     Type and Reason for Visit: Initial, Consult    Nutrition Recommendations/Plan:   Continue regular diet  Monitor intakes for possible need of ONS     Malnutrition Assessment:  Malnutrition Status: No malnutrition    Nutrition Assessment:  RD consult for new admission to rehab s/p fall with rt hip femoral neck fracture. No wt loss per EMR. On regular diet with intakes >50% over the past two days. Will monitor intakes while admitted for possible need of ONS. Nutrition Related Findings:   +BM 10/31. Trace RLE edema. Wound Type: None    Current Nutrition Therapies:    ADULT DIET; Regular    Anthropometric Measures:  Height: 5' 10\" (177.8 cm)  Current Body Wt: 147 lb (66.7 kg)   BMI: 21.1    Nutrition Diagnosis:   No nutrition diagnosis at this time    Nutrition Interventions:   Food and/or Nutrient Delivery: Continue Current Diet  Nutrition Education/Counseling: Education not indicated  Coordination of Nutrition Care: Continue to monitor while inpatient     Goals:  Goals: PO intake 50% or greater     Nutrition Monitoring and Evaluation:   Behavioral-Environmental Outcomes: None Identified  Food/Nutrient Intake Outcomes: Food and Nutrient Intake  Physical Signs/Symptoms Outcomes: Weight, GI Status, Fluid Status or Edema    Discharge Planning:     Too soon to determine     Ryan Harp, [address] [place] [address],   Contact: [phone]

## 2022-11-03 NOTE — CARE COORDINATION
Chart Reviewed. Attempted visit. She denied visit at this time and requested I not wake her up for visit. Offered to call a family member and she denied. Will stop another time.   [facility]     Case Management   [phone]    11/3/2022  5:07 PM

## 2022-11-03 NOTE — PLAN OF CARE
Problem: Discharge Planning  Goal: Discharge to home or other facility with appropriate resources  Outcome: Progressing  Flowsheets (Taken 11/3/2022 0900)  Discharge to home or other facility with appropriate resources: Identify barriers to discharge with patient and caregiver     Problem: Safety - Adult  Goal: Free from fall injury  Outcome: Progressing  Flowsheets (Taken 11/3/2022 1242)  Free From Fall Injury: Instruct family/caregiver on patient safety     Problem: ABCDS Injury Assessment  Goal: Absence of physical injury  Outcome: Progressing     Problem: Pain  Goal: Verbalizes/displays adequate comfort level or baseline comfort level  Outcome: Progressing     Problem: Discharge Planning  Goal: Discharge to home or other facility with appropriate resources  Outcome: Progressing  Flowsheets (Taken 11/3/2022 0900)  Discharge to home or other facility with appropriate resources: Identify barriers to discharge with patient and caregiver     Problem: Safety - Adult  Goal: Free from fall injury  Outcome: Progressing  Flowsheets (Taken 11/3/2022 1242)  Free From Fall Injury: Instruct family/caregiver on patient safety     Problem: ABCDS Injury Assessment  Goal: Absence of physical injury  Outcome: Progressing     Problem: Pain  Goal: Verbalizes/displays adequate comfort level or baseline comfort level  Outcome: Progressing     Problem: Discharge Planning  Goal: Discharge to home or other facility with appropriate resources  Outcome: Progressing  Flowsheets (Taken 11/3/2022 0900)  Discharge to home or other facility with appropriate resources: Identify barriers to discharge with patient and caregiver     Problem: Safety - Adult  Goal: Free from fall injury  Outcome: Progressing  Flowsheets (Taken 11/3/2022 1242)  Free From Fall Injury: Instruct family/caregiver on patient safety     Problem: ABCDS Injury Assessment  Goal: Absence of physical injury  Outcome: Progressing     Problem: Pain  Goal: Verbalizes/displays adequate comfort level or baseline comfort level  Outcome: Progressing     Problem: Discharge Planning  Goal: Discharge to home or other facility with appropriate resources  Outcome: Progressing  Flowsheets (Taken 11/3/2022 0900)  Discharge to home or other facility with appropriate resources: Identify barriers to discharge with patient and caregiver     Problem: Safety - Adult  Goal: Free from fall injury  Outcome: Progressing  Flowsheets (Taken 11/3/2022 1242)  Free From Fall Injury: Instruct family/caregiver on patient safety     Problem: ABCDS Injury Assessment  Goal: Absence of physical injury  Outcome: Progressing     Problem: Pain  Goal: Verbalizes/displays adequate comfort level or baseline comfort level  Outcome: Progressing

## 2022-11-03 NOTE — PLAN OF CARE
[address] [address]   [facility] 65  [phone]    Pawan Mcghee    : 1950  [id number]  MRN: [id number]   PHYSICIAN:  Bebeto Pearl MD  Primary Problem    Patient Active Problem List   Diagnosis    Closed fracture of right hip [facility])    Closed right hip fracture, initial encounter [facility])    Closed right hip fracture, sequela       Rehabilitation Diagnosis:     Closed right hip fracture, sequela [S72.001S]       ADMIT DATE:2022    Patient Goals: \"get rid of this pain so I can sleep, walk & function properly\"    Admitting Impairments: Angulated right neck femoral fracture-  S/P RIGHT total hip arthroplasty, , Dr. Reyes Maris, oxycodone    History of lumbar spine surgery, chronic low back pain-  lorazepam, Tylenol     HLD- Lipitor, ASA  Barriers: fatigue, pain, comorbidities,  Participation: good     CARE PLAN     NURSING:  Pawan Mcghee while on this unit will:     [] Be continent of bowel and bladder     [x] Have an adequate number of bowel movements  [x] Urinate with no urinary retention >300ml in bladder  [] Complete bladder protocol with phillips removal  [x] Maintain O2 SATs at _92%  [x] Have pain managed while on ARU       [] Be pain free by discharge   [x] Have no skin breakdown while on ARU  [] Have improved skin integrity via wound measurements  [x] Have no signs/symptoms of infection at the wound site  [x] Be free from injury during hospitalization   [x] Complete education with patient/family with understanding demonstrated for:hip fracture, smoking cessation  [x] Adjustment   [x] Other:smoking cessation,   Nursing interventions may include bowel/bladder training, education for medical assistive devices, medication education, O2 saturation management, energy conservation, stress management techniques, fall prevention, alarms protocol, seating and positioning, skin/wound care, pressure relief instruction,dressing changes,  infection protection, DVT prophylaxis, and/or assistance with in room safety with transfers to bed, toilet, wheelchair, shower as well as bathroom activities and hygiene. Patient/caregiver education for:   [x] Disease/sustained injury/management      [x] Medication Use   [] Surgical intervention   [x] Safety   [x] Body mechanics and or joint protection   [x] Health maintenance         PHYSICAL THERAPY:  Goals:                  Short Term Goals  Time Frame for Short Term Goals: 7-10 days  Short Term Goal 1: bed mobility modif I  Short Term Goal 2: all transfers modif I  Short Term Goal 3: amb >80' modif I with wh walker  Short Term Goal 4: 12 steps R rail modif I  Short Term Goal 5: HEP I               These goals were reviewed with this patient at the time of assessment and Kayleigh Thornton is in agreement. Plan of Care: Pt to be seen 90 mins per day for 5-6 day/week 7-10 days.                    Current Treatment Recommendations: Strengthening, ROM, Functional mobility training, Transfer training, ADL/Self-care training, Gait training, Positioning, Modalities, Therapeutic activities, Patient/Caregiver education & training      OCCUPATIONAL THERAPY:  Goals:             Short Term Goals  Time Frame for Short Term Goals: by 5 days pt will complete  Short Term Goal 1: toilet tasks w/ MI  Short Term Goal 2: household transfers w/ MI using RW  Short Term Goal 3: shower level bathing w/ set up using shower chair + GB  Short Term Goal 4: tolerate standing x 8-10 minutes during kitchen/IADL tasks w/ MI  Short Term Goal 5: UB & LB ADLs w/ MI using A/E prn  Additional Goals?: Yes :  Long Term Goals  Time Frame for Long Term Goals : by 5 days pt will complete  Long Term Goal 1: complete caregiver education & address DME needs prior to DC  Long Term Goal 2: provide UE HEP for strengthening to improve IND w/ sit<>stand & overall strength to 4+/5 :    These goals were reviewed with this dietician/nutritionist may monitor calorie count as well as intake and collaboratively work with SLP on dietary upgrades. Neuropsychology/Psychology may evaluate and provide necessary support. Medical issues being managed closely and that require 24 hour availability of a physician:   [] Swallowing Precautions  [] Bowel/Bladder Fx  [] Weight bearing precautions   [x] Wound Care    [x] Pain Mgmt   [x] Infection Protection   [x] DVT Prophylaxis   [x] Fall Precautions  [x] Fluid/Electrolyte/Nutrition Balance   [] Voice Protection   [] Respiratory  [] Other:    Medical Prognosis: [] Good  [x] Fair    [] Guarded   Total expected IRF days 10 days  Anticipated discharge destination:    [] Home Independently   [x] Home Modified Independent  [] Home with supervision    []SNF     [] Other                                           Physician anticipated functional outcomes:  Dion for functional mobility and ADLs     IPOC brief synthesis: 75 yo female admitted for a fall resulting in RIGHT hip femoral neck fracture with displacement. Patient is now  s/p 11/1 R MACI now WBAT. Per Dr. Sherryle Greenland no precautions. PMH: Chronic back pain, Restless leg syndrome. Patient lives with family and fully IND no use of AD.   Patient started therapies, but is limited by pain and is agreeable to rehab unit treatment before discharge to home           I have reviewed this initial plan of care and agree with its contents:    Title   Name    Date    Time    Physician: Dr. Andrea Sanchez, 11/3/22, 3 pm    Case Mgmt: Veronica Mcgee LSW,MSW    OT: Meli Lovelace OTR/L #[id number]     PT:Electronically signed by Alberto Murrell PT on 11/3/22 at 1:33 PM EDT:     ST:    ISAURA Colby RN CRRN 11/4/2022    Other:

## 2022-11-03 NOTE — PROGRESS NOTES
Physical Therapy  Facility/Department: [facility]  Rehabilitation Physical Therapy Initial Assessment    NAME: Kelli Cortes  : 1950 (40 y.o.)  MRN: [id number]  CODE STATUS: Full Code    Date of Service: 11/3/22      Past Medical History:   Diagnosis Date    Arthritis     Chronic back pain     Hyperlipidemia     Restless leg syndrome      Past Surgical History:   Procedure Laterality Date    BACK SURGERY      x 5 disc surgeries    TOTAL HIP ARTHROPLASTY Right 2022    RIGHT ANTERIOR TOTAL HIP REPLACEMENT performed by Bucky Jimenez MD at [address]         Chart Reviewed: Yes  Additional Pertinent Hx: here due to fall in bathroom at home - found to have right hip fracture, R MACI  Family / Caregiver Present: No  Referring Practitioner: Elie  Referral Date : 22  General Comment  Comments: pt is a little impulsive, neeeds cues to slow down    Restrictions:  Restrictions/Precautions: Weight Bearing; Fall Risk  Lower Extremity Weight Bearing Restrictions  Right Lower Extremity Weight Bearing: Weight Bearing As Tolerated  Position Activity Restriction  Other position/activity restrictions: WBAT no precautions per Dr Darius Moore       Prior Level of Function:  Social/Functional History  Lives With: Daughter (son in law and grand children.  family works during the day)  Type of Home: [facility] [address] [employer]: Two level, Bed/Bath upstairs  Home Access: Stairs to enter without rails  Entrance Stairs - Number of Steps: 1+1 in front, 2 RENITA in back, pt usually goes in from the front  Bathroom Shower/Tub: Tub/Shower unit  [employer]: Standard  ADL Assistance: Independent  Homemaking Assistance: Independent (shares)  Ambulation Assistance: Independent  Transfer Assistance: Independent  Active : Yes  Occupation: Retired  Type of Occupation:   Leisure & Hobbies: takes care of grandson with autism      OBJECTIVE  Vision  Vision: Impaired  Vision Exceptions: Wears glasses for reading    Hearing  Hearing: Within functional limits    Cognition  Overall Cognitive Status: Exceptions  Arousal/Alertness: Appropriate responses to stimuli  Following Commands:  Follows multistep commands consistently  Attention Span: Attends with cues to redirect  Safety Judgement: Decreased awareness of need for safety;Decreased awareness of need for assistance  Insights: Decreased awareness of deficits  Initiation: Does not require cues  Sequencing: Requires cues for some    ROM  AROM RLE (degrees)  RLE AROM: WFL  AROM LLE (degrees)  LLE AROM : WFL    Strength  Strength RLE  Comment: hip 3/5, knee 4/5, ankle 4+/5  Strength LLE  Strength LLE: WFL    Quality of Movement  Tone RLE  RLE Tone: Normotonic  Tone LLE  LLE Tone: Normotonic  Coordination  Rapid Alternating Movements: Normal  Knee proprioception : Normal  Ankle proprioception : Normal  Great toe proprioception : Normal    Sensation  Overall Sensation Status: WFL    Functional Mobility  Bed mobility  Rolling to Left: Stand by assistance  Supine to Sit: Stand by assistance  Sit to Supine: Stand by assistance  Scooting: Stand by assistance  Bed Mobility Comments: flat bed, no rail, no leg  used, pt did not want to roll onto R side due to pain  Transfers  Sit to Stand: Stand by assistance  Stand to Sit: Stand by assistance  Car Transfer: Stand by assistance  Balance  Posture: Fair  Sitting - Static: Good  Sitting - Dynamic: Good  Standing - Static: Good;-  Standing - Dynamic: Good;-    Environmental Mobility  Ambulation  Surface: Level tile  Device: Rolling Walker  Assistance: Stand by assistance;Contact guard assistance  Quality of Gait: short step to pattern leading with the right LE  - good heel contact  Distance: 68' x 2  Comments: pt anxious, needs cues for technique  PM session: pt amb 220' with 4 turns, 2 surface transitions and with wh walker, mildly stooped posture, wh walker appears a little high for pt but she does not want it adjusted. Pt mildly impulsive and needs cues for safety and steering wh walker. Stairs/Curb  Stairs?: Yes  Stairs  # Steps : 4  Stairs Height: 6\"  Rails: Bilateral  Curbs: 6\"  Device: Rolling walker  Assistance: Contact guard assistance  Comment: pt impulsive, went up reciprocally despite instructions before performing steps and cues on each step for technique, pt indicates pain when stepping up with R LE but did not slow down or follow firm instructions to step up only with the L  PM session: pt requests to use toilet before getting in bed for therex: toilet transfer CGA, toileting CGA with pt managing cleaning and most of clothing, needed assist only with pulling pants up from the floor to her knees where she could reach them comfortably. PT Exercises  Static Sitting Balance Exercises: sitting AP, TKE, add sets x 15  PM session: bed ex: AP, GS, QS, add sets, heel slides with sliding sheet, hip abduction with sliding sheet, TKE x 15. Issued HEP. ASSESSMENT       Activity Tolerance  Activity Tolerance: Patient tolerated treatment well;Patient limited by pain    Assessment  Assessment: Pt presents today with some impulsivity and anxiety. Pt performed all transfers with SBA to CGA. Pt amb household distances with a wh walker SBA to CGA. Pt began steps but was impulsive nad had poor technique despite multiple repeated cues for proper technique. Pt will need a wh walker for home use. Pt will benefit from continued therapy before d/c home, will benefit from home PT upon d/c. Therapy Prognosis: Good    CLINICAL IMPRESSION   Pt presents today with some impulsivity and anxiety. Pt performed all transfers with SBA to CGA. Pt amb household distances with a wh walker SBA to CGA. Pt began steps but was impulsive nad had poor technique despite multiple repeated cues for proper technique. Pt will need a wh walker for home use.   Pt will benefit from continued therapy before d/c home, will benefit from home PT upon d/c.    GOALS  Patient Goals   Patient Goals : pain relief and get home  Short Term Goals  Time Frame for Short Term Goals: 7-10 days  Short Term Goal 1: bed mobility modif I  Short Term Goal 2: all transfers modif I  Short Term Goal 3: amb >80' modif I with wh walker  Short Term Goal 4: 12 steps R rail modif I  Short Term Goal 5: HEP I    PLAN OF CARE  Frequency: 1-2 treatment sessions per day, 5-7 days per week  Physcial Therapy Plan  General Plan:  minutes of therapy at least 5 out of 7 days a week  Current Treatment Recommendations: Strengthening;ROM; Functional mobility training;Transfer training;ADL/Self-care training;Gait training;Positioning;Modalities; Therapeutic activities; Patient/Caregiver education & training  Safety Devices  Type of Devices: All fall risk precautions in place;Gait belt    EDUCATION  Education  Education Given To: Patient  Education Provided: Role of Therapy;Plan of Care  Education Provided Comments: educated on purpose of OT services  Education Method: Demonstration;Verbal  Barriers to Learning: Other (Comment)    ELOS: 7-10 days    Therapy Time   Individual Concurrent Group Co-treatment   Time In 0900         Time Out 1000         Minutes 60           Timed Code Treatment Minutes: [address] Matthew, PT, 11/03/22 at 12:53 PM   Second Session Therapy Time     Individual Co-treatment   Time In 9865     Time Out 3604     Minutes 30      Pt left in bed, alarm on, call light and needs in reach, RN aware pt wants pain meds and ativan when they are due.   Electronically signed by Pranav Fisher PT on 11/3/2022 at 3:02 PM

## 2022-11-03 NOTE — PROGRESS NOTES
Patient admitted to rehab with R total hip. A/Ox4. Transfers with RWx1. Mobility restrictions: WBAT. On reg diet, tolerating well. Medications taken whole with thins. On ASA for DVT prophylaxis. Skin: intact except for surgical incision R Hip, scattered bruising. Oxygen: RA. LDA: No IV. Has been continent  of bowel and continent of bladder. LBM 10/31 (per pt). Chair/bed alarms in use and call light in reach.

## 2022-11-03 NOTE — H&P
Department of [facility] History [facility] Physical      Patient Identification:  Erin Ratliff  : 1950  Admit date: 2022  Dictation date: 11/3/2022  Attending provider: Roxanna Gayle MD        Primary care provider: Enmanuel Messer MD       Chief Complaint:   Patient Active Problem List   Diagnosis    Closed fracture of right hip [facility])    Closed right hip fracture, initial encounter [facility])    Closed right hip fracture, sequela       History of Present Illness/Hospital Course:  75 yo female admitted for a fall resulting in RIGHT hip femoral neck fracture with displacement. Patient is now  s/p  R MACI now WBAT. Per Dr. Maurice Bhatia no precautions. PMH: Chronic back pain, Restless leg syndrome. Patient lives with family and fully IND no use of AD. Patient started therapies, but is limited by pain and is agreeable to rehab unit treatment before discharge to home    Prior Level of Function:  Independent in self care and ADLs prior to admission. Current Level of Function:  PT:  Transfers  Sit to Stand: Minimal Assistance  Stand to Sit: Minimal Assistance  Ambulation  Surface: Level tile  Device: Rolling Walker  Assistance: Minimal assistance  Quality of Gait: short step to pattern leading with the right LE  - good heel contact  Distance: 5 feet (bed to recliner)    OT:    ADL  Grooming: Setup  Grooming Skilled Clinical Factors: seated face washing  LE Dressing: Dependent/Total  LE Dressing Skilled Clinical Factors: doffing/donning socks. donning adilia hose  Additional Comments: Anticipate Max A LB and SBA UB ADL based on balance, endurance, cognition, pain and PLOF          has a past medical history of Arthritis, Chronic back pain, Hyperlipidemia, and Restless leg syndrome. reports that she has been smoking cigarettes. She has been smoking an average of .5 packs per day. She has never used smokeless tobacco. She reports current alcohol use.  She reports that she does not use drugs. family history is not on file. Prior to Admission medications    Medication Sig Start Date End Date Taking? Authorizing Provider   oxyCODONE (ROXICODONE) 5 MG immediate release tablet Take 1 tablet by mouth every 4 hours as needed for Pain for up to 5 days. 11/2/22 11/7/22  Rodolfo Duron JES Chapin CNP   aspirin 81 MG chewable tablet Take 1 tablet by mouth in the morning and 1 tablet in the evening. Take twice a day for 30 days after hip surgery for DVT blood clot prophylaxis. Then can resume daily dosing. 11/2/22 12/2/22  Roseliaqueenie JES Delgado CNP   methocarbamol (ROBAXIN) 500 MG tablet Take 500 mg by mouth in the morning and at bedtime    Historical Provider, MD   simvastatin (ZOCOR) 40 MG tablet Take 40 mg by mouth every morning    Historical Provider, MD   LORazepam (ATIVAN) 2 MG tablet Take 2 mg by mouth every 6 hours as needed for Anxiety. Historical Provider, MD         REVIEW OF SYSTEMS:   CONSTITUTIONAL: negative for fevers, chills, diaphoresis, activity change, appetite change, fatigue, night sweats and unexpected weight change. EYES: negative for blurred vision, eye discharge, visual disturbance and icterus. HEENT: negative for hearing loss, tinnitus, ear drainage, sinus pressure, nasal congestion, epistaxis and snoring. RESPIRATORY: Negative for hemoptysis, cough, sputum production. CARDIOVASCULAR: negative for chest pain, palpitations, exertional chest pressure/discomfort, edema, syncope. GASTROINTESTINAL: negative for nausea, vomiting, diarrhea, constipation, blood in stool and abdominal pain. GENITOURINARY: negative for frequency, dysuria, urinary incontinence, decreased urine volume, and hematuria. HEMATOLOGIC/LYMPHATIC: negative for easy bruising, bleeding and lymphadenopathy. ALLERGIC/IMMUNOLOGIC: negative for recurrent infections, angioedema, anaphylaxis and drug reactions.    ENDOCRINE: negative for weight changes and diabetic symptoms including polyuria, polydipsia and polyphagia. MUSCULOSKELETAL: Positive for pain, joint swelling, decreased range of motion and muscle weakness in right hip area. Joshua Parisian +Chronic low back pain  NEUROLOGICAL: negative for headaches, slurred speech, unilateral weakness. PSYCHIATRIC/BEHAVIORAL: negative for hallucinations, behavioral problems, confusion and agitation. All pertinent positives are noted in the HPI. Physical Examination:  Vitals: Patient Vitals for the past 24 hrs:   BP Temp Temp src Pulse Resp SpO2 Height Weight   11/02/22 1946 107/65 98 °F (36.7 °C) Oral 82 16 94 % -- --   11/02/22 1745 -- -- -- -- 17 -- -- --   11/02/22 1559 -- -- -- -- -- -- 5' 10\" (1.778 m) 151 lb 3.8 oz (68.6 kg)   11/02/22 1558 117/76 98 °F (36.7 °C) Oral 73 18 94 % -- --     Psych: Stable mood, normal judgement, normal affect   Const: No distress  Eyes: Conjunctiva noninjected, no icterus noted; pupils equal, round, and reactive to light. HENT: Atraumatic, normocephalic; Oral mucosa moist  Neck: Trachea midline, neck supple. No thyromegaly noted. CV: Regular rate and rhythm, no murmur rub or gallop noted  Resp: Lungs clear to auscultation bilaterally, no rales wheezes or ronchi, no retractions. Respirations unlabored. GI: Soft, nontender, nondistended. Normal bowel sounds. No palpable masses. Neuro: Alert, oriented, appropriate. No cranial nerve deficits appreciated. Motor examination reveals normal strength in all four limbs diffusely. Skin: Normal temperature and turgor  MSK: Positive for pain, joint swelling, decreased range of motion and muscle weakness in right hip area. Ext: No significant edema appreciated. No varicosities.     Lab Results   Component Value Date    WBC 11.6 (H) 11/02/2022    HGB 11.4 (L) 11/02/2022    HCT 33.5 (L) 11/02/2022    MCV 95.7 11/02/2022     11/02/2022     Lab Results   Component Value Date    INR 1.12 10/31/2022    PROTIME 14.3 10/31/2022     Lab Results   Component Value Date CREATININE 0.6 11/02/2022    BUN 19 11/02/2022     11/02/2022    K 4.4 11/02/2022     11/02/2022    CO2 24 11/02/2022     Lab Results   Component Value Date    ALT 21 10/31/2022    AST 40 (H) 10/31/2022    ALKPHOS 114 10/31/2022    BILITOT 0.9 10/31/2022       XR HIP RIGHT (2-3 VIEWS)    Result Date: 11/1/2022  Radiology exam is complete. No Radiologist dictation. Please follow up with ordering provider. CT HEAD WO CONTRAST    Result Date: 10/31/2022  EXAMINATION: CT OF THE HEAD WITHOUT CONTRAST  10/31/2022 11:50 am TECHNIQUE: CT of the head was performed without the administration of intravenous contrast. Automated exposure control, iterative reconstruction, and/or weight based adjustment of the mA/kV was utilized to reduce the radiation dose to as low as reasonably achievable. COMPARISON: None HISTORY: ORDERING SYSTEM PROVIDED HISTORY: fall TECHNOLOGIST PROVIDED HISTORY: Has a \"code stroke\" or \"stroke alert\" been called? ->No Reason for exam:->fall Reason for Exam: fall FINDINGS: BRAIN/VENTRICLES: Patient's head is tilted toward the right in the CT gantry. Ventricular system is within normal limits. No evidence of mass effect or midline shift. Subtle foci of low attenuation within periventricular/subcortical white matter are identified. Finding likely related to changes of mild ischemic leukoencephalopathy. No abnormal extra-axial fluid collection is identified. There is minimal atherosclerotic calcification of distal internal carotid arteries. ORBITS: The visualized portion of the orbits demonstrate no acute abnormality. SINUSES: The visualized paranasal sinuses and mastoid air cells demonstrate no acute abnormality. There is mucosal thickening within several ethmoid air cells. Minimal mucosal thickening identified within frontal, sphenoid, and maxillary sinuses. There is leftward deviation of the nasal septum. There is right-sided julito bullosa of middle nasal turbinate.  SOFT TISSUES/SKULL:  No acute abnormality of the visualized skull or soft tissues. No evidence of acute intracranial abnormality. XR CHEST 1 VIEW    Result Date: 10/31/2022  EXAMINATION: ONE XRAY VIEW OF THE CHEST 10/31/2022 12:57 pm COMPARISON: None. HISTORY: ORDERING SYSTEM PROVIDED HISTORY: preop TECHNOLOGIST PROVIDED HISTORY: Reason for exam:->preop Reason for Exam: preop FINDINGS: Normal cardiomediastinal silhouette. No acute airspace infiltrate. No pneumothorax or pleural effusion     No acute cardiopulmonary findings     FLUORO FOR SURGICAL PROCEDURES    Result Date: 11/1/2022  Radiology exam is complete. No Radiologist dictation. Please follow up with ordering provider. XR HIP 2-3 VW W PELVIS RIGHT    Result Date: 10/31/2022  EXAMINATION: ONE XRAY VIEW OF THE PELVIS AND TWO XRAY VIEWS RIGHT HIP 10/31/2022 12:57 pm COMPARISON: None. HISTORY: ORDERING SYSTEM PROVIDED HISTORY: fall, injury TECHNOLOGIST PROVIDED HISTORY: Reason for exam:->fall, injury Reason for Exam: fall, injury FINDINGS: There is an angulated and displaced fracture through the right femoral neck. Angulated and displaced right femoral neck fracture         The above labs and diagnostic studies have been reviewed by myself upon admission to inpatient rehabilitation. Barriers to Discharge: Patient  has a past medical history of Arthritis, Chronic back pain, Hyperlipidemia, and Restless leg syndrome. Patient lives with family and was fully IND with no use of AD. POST ADMISSION PHYSICIAN EVALUATION  The patient has agreed to being admitted to our comprehensive inpatient  rehabilitation facility consisting of at least 180 minutes of therapy a day,  5 out of 7 days a week. The patient/family has a good understanding of our discharge process.  The  patient has potential to make improvement and is in need of at least two of  the following multidisciplinary therapies including but not limited to  physical, occupational, respiratory, and speech, nutritional services, wound care,   and prosthetics and orthotics. Given the patients complex condition  and risk of further medical complications, rehabilitation services cannot be  safely provided at a lower level of care such as a skilled nursing facility. I have compared the patients medical and functional status at the time of the  preadmission screening and the same on this date, and there are no significant changes. By signing this document, I acknowledge that I have personally performed a  full physical examination on this patient within 24 hours of admission to  this inpatient rehabilitation facility and have determined the patient to be  able to tolerate the above course of treatment at an intensive level for a  reasonable period of time. I will be completing a detailed individualized  Plan of Care for this patient by day four of the patients stay based upon the  Preadmission Screen, this Post-Admission Evaluation, and the therapy  evaluations. Assessment and Plan:    Angulated right neck femoral fracture-  S/P RIGHT total hip arthroplasty, 11/1, Dr. Jon Salgado, oxycodone    History of lumbar spine surgery, chronic low back pain-  lorazepam, Tylenol    HLD- Lipitor, ASA    Bowels: Schedule Miralax + Senna S. Follow bowel movements. Enema or suppository if needed. Bladder: Check PVR x 3. [address] if PVR > 200ml or if any volume is > 500 ml. Pain: Oxycodone is ordered prn.        Yonatan David MD, 11/3/2022 , 10:01 AM

## 2022-11-03 NOTE — PROGRESS NOTES
Pt up without assist to BR. Bed alarm sounding. Pt toileted herself and returned to bed stating, \" I had to go. \" Reminded pt to use the call light before getting OOB. Pt admitted with R THR after fall at home. SI well approximated with surgical glue and prineo. Pt does have pain and uses Oxy prn. Last given at 2120. Pt declines to use ice bags stating, \" It make it throb even more. \" Lungs CTA. No sob or cough. On RA. Belly round and soft with active BS. LBM 10/31. Continent B and B. Trace edema to RLE. R hand bruised from fall. Call light in reach. Bed alarm on.

## 2022-11-03 NOTE — PROGRESS NOTES
Occupational Therapy  Facility/Department: [facility]  Rehabilitation Occupational Therapy Evaluation       Date: 11/3/22  Patient Name: Tito Oliva       Room: C0M-3110/2993-52  MRN: [id number]  Account: [de-identified]   : 1950  (69 y.o.) Gender: female     Referring Practitioner: Dr Andrea Sanchez, Dr Mario ortho  Diagnosis: Fall, R femoral neck fx, THR  Additional Pertinent Hx: 73 yo female admitted for a fall resulting in RIGHT hip femoral neck fracture with displacement. s/p  R MACI now WBAT. Per Dr. Mario no precautions. PMH: Chronic back pain, Restless leg syndrome    Restrictions  Restrictions/Precautions: Weight Bearing; Fall Risk  Other position/activity restrictions: WBAT no precautions per Dr Mario, reg diet  Right Lower Extremity Weight Bearing: Weight Bearing As Tolerated  Equipment Used: Bed;Other    Subjective  Subjective: Pt resting in bed upon arrival, gets up impulsively & wants to use toilet, OT did not have RW in front of her, also exited bathrm without alerting OT  Comments: reporting 6/10 R hip pain; offered shower but only wanted to sponge          Vitals  Temp: 97.5 °F (36.4 °C)  Heart Rate: 83  Resp: 18  BP: 127/71  Height: 5' 10\" (177.8 cm)  Weight: 147 lb 11.3 oz (67 kg)  BMI (Calculated): 21.2  Oxygen Therapy  SpO2: 93 %  Pulse Oximetry Type:  Intermittent  Pulse Oximeter Device Mode: Intermittent  Pulse Oximeter Device Location: Finger;Right  O2 Device: None (Room air)  Level of Consciousness: Alert (0)    Objective     Cognition  Overall Cognitive Status: Exceptions  Following Commands: Inconsistently follows commands (is impulsive, safety concerns)  Attention Span: Attends with cues to redirect  Safety Judgement: Decreased awareness of need for safety;Decreased awareness of need for assistance  Problem Solving: Assistance required to generate solutions  Insights: Decreased awareness of deficits  Initiation: Does not require cues  Sequencing: Requires cues for some  Cognition Comment: pt w/ flat affect, is impulsive & restless--gets up without RW or OT present  Orientation  Overall Orientation Status: Within Functional Limits       ROM  LUE AROM (degrees)  LUE AROM : WNL  Left Hand AROM (degrees)  Left Hand AROM: WNL  RUE AROM (degrees)  RUE AROM : WNL  Right Hand AROM (degrees)  Right Hand AROM: WNL  LUE Strength  Gross LUE Strength: Exceptions to [facility]  L Shoulder Flex: 4-/5  L Hand General: 4+/5  RUE Strength  Gross RUE Strength: Exceptions to [facility]  R Shoulder Flex: 4-/5  R Hand General: 4+/5  Fine Motor Skills  Coordination  Movements Are Fluid And Coordinated: Yes       Hand Assessment     Functional Mobility  Balance  Sitting Balance: Supervision (seated on EOB, weight shifted to L side due to R hip pain)  Standing Balance: Contact guard assistance (CLOSE SBA few CGA due to impulsivity, leaves RW behind & attempts to move without it)  Standing Balance  Time: @ 3 minutes  Activity: oral care at sink  Comment: close SBA but reports increased R hip pain to 7/10 (RN provided medication during session)  Transfers  Sit to stand: Contact guard assistance  Stand to sit: Contact guard assistance  Transfer Comments: impulsive; given tactile cues for RW safety & hand placement, cues to slow down especially when turning & backing up  Toilet Transfers  Toilet - Technique: Ambulating  Equipment Used: Grab bars  Toilet Transfer: Stand by assistance  Toilet Transfers Comments: used GB, moves impulsively which impacts her safety  Shower Transfers  Shower Transfers Comments: offered shower but only wanted to sponge bathe  Wheelchair Bed Transfers  Equipment Used: Bed;Other  Level of Asssistance: Contact guard assistance  Wheelchair Transfers Comments: CG/SBA to use RW in & out of bed (did not wait for OT to place RW in front of her prior to ambulating into bathrm); tactile cues for safe hand placement  Social/Functional History  Lives With: Daughter (son in law and grand children.  family works during the day)  Type of Home: House  Home Layout: Two level;Bed/Bath upstairs (laundry room on 2nd fl; has 1/2 bath on 1st fl)  Home Access: Stairs to enter without rails  Entrance Stairs - Number of Steps: 1+1 in front, 2 RENITA in back, pt usually goes in from the front (7+6 steps w/ 1 HR to upstairs)  Bathroom Shower/Tub: Tub/Shower unit  Bathroom Toilet: Standard  Bathroom Accessibility: Not accessible (doesn't think RW will fit)  Has the patient had two or more falls in the past year or any fall with injury in the past year?: Yes (had fallen down a flight of steps after back surgery)  ADL Assistance: Independent  Homemaking Assistance: Independent (shares)  Ambulation Assistance: Independent  Transfer Assistance: Independent  Active : Yes  Mode of Transportation: Car  Occupation: Retired  Type of Occupation:   Leisure & Hobbies: takes care of grandson with autism, watches TV, is a homebody  IADL Comments: pt does cook for herself  Additional Comments: pt alone during the day until her autistic grandson arrives from school  ADL  Feeding: Setup  Feeding Skilled Clinical Factors: OT poured water into her cup & put straw in lid  Grooming: Setup;Stand by assistance  Grooming Skilled Clinical Factors: provided SB + set up to complete oral care, also combed hair & washed face  UE Bathing: Verbal cueing;Stand by assistance  UE Bathing Skilled Clinical Factors: sat at sink to sponge bathe arms, chest & armpits w/ set up & encouragement; offered shower but she declined  LE Bathing: Contact guard assistance  LE Bathing Skilled Clinical Factors: washed thighs thru shins, declined a shower, did not tend to her feet, leans to side to clean lars area; CGA when drying off backside  UE Dressing: Setup;Stand by assistance;Verbal cueing  UE Dressing Skilled Clinical Factors: pt insisting on wearing a hosp gown, OT encouraged her to don tshirt from [address] [facility]; stood to   [facility] d/t [facility] weakness  LE Dressing: Moderate assistance  LE Dressing Skilled Clinical Factors: pt does NOT have hip precautions, she was able to bend over to reach feet to doff/don underwear, stood w/ close SB/CGA to manage clothing over hips; OT doff/donned non skid socks & adilia hose  Toileting: Contact guard assistance  Toileting Skilled Clinical Factors: close SB/CGA due to moving impulsively; encouraged use of wipes to clean her lars areas  Additional Comments: she was offered shower but needed lots of encouragement to sponge bathe    Goals  Patient Goals   Patient goals : \"get rid of this pain so I can sleep, walk & function properly\"  Short Term Goals  Time Frame for Short Term Goals: by 5 days pt will complete  Short Term Goal 1: toilet tasks w/ MI  Short Term Goal 2: household transfers w/ MI using RW  Short Term Goal 3: shower level bathing w/ set up using shower chair + GB  Short Term Goal 4: tolerate standing x 8-10 minutes during kitchen/IADL tasks w/ MI  Short Term Goal 5: UB & LB ADLs w/ MI using A/E prn  Additional Goals?: Yes  Long Term Goals  Time Frame for Long Term Goals : by 5 days pt will complete  Long Term Goal 1: complete caregiver education & address DME needs prior to DC  Long Term Goal 2: provide UE HEP for strengthening to improve IND w/ sit<>stand & overall strength to 4+/5    Assessment  Performance deficits / Impairments: Decreased functional mobility ; Decreased endurance;Decreased ADL status; Decreased safe awareness;Decreased high-level IADLs;Decreased balance;Decreased cognition  Assessment: 75 yo female admitted for a fall resulting in RIGHT hip femoral neck fracture with displacement. s/p 11/1 R MACI now WBAT. Per Dr. Orin Vazquez no precautions. PMH: Chronic back pain, Restless leg syndrome. PTA, pt lives with family and fully IND no use of AD. Pt anxious & restless which impacts her safety.  Pt required SB A for bed mobility, close SB/CGA w/ fxl tx, and mobility with RW-- mod cues to slow pace and RW management. She required up to CGA w/ doff/donning underwear & pants, max A to manage non skid socks & adilia hose. Today, pt functioning below occupational performance baseline limited by R hip pain and decreased endurance and safety awareness. She would benefit from OT services on rehab x 5 days to improve her IND w/ ADLs, t/f & safety w/ fxl mobility in order to return home w/ family support  Treatment Diagnosis: impaired ADL/fxl mobility  Prognosis: Good  Decision Making: Medium Complexity  Treatment Initiated : Thurs 11/3/22  Discharge Recommendations: 5-7 sessions per week;Patient would benefit from continued therapy after discharge;Continue to assess pending progress  Occupational Therapy Plan  Times Per Week: 5-6  Times Per Day: Twice a day  Hours Per Day: 1.5 hours  Therapy Duration: 1 Week  Specific Instructions for Next Treatment: shower, loan A/ALICE  Current Treatment Recommendations: Strengthening;ROM;Balance training;Functional mobility training; Endurance training;Pain management; Safety education & training;Self-Care / ADL; Home management training;Modalities; Positioning;Patient/Caregiver education & training;Equipment evaluation, education, & procurement       Therapy Time   Individual Concurrent Group Co-treatment   Time In 1030         Time Out 1200         Minutes 90         Timed Code Treatment Minutes: [address], [place] [id number]

## 2022-11-04 PROCEDURE — 6360000002 HC RX W HCPCS: Performed by: PHYSICAL MEDICINE & REHABILITATION

## 2022-11-04 PROCEDURE — 94760 N-INVAS EAR/PLS OXIMETRY 1: CPT

## 2022-11-04 PROCEDURE — 97535 SELF CARE MNGMENT TRAINING: CPT

## 2022-11-04 PROCEDURE — 97110 THERAPEUTIC EXERCISES: CPT

## 2022-11-04 PROCEDURE — 97530 THERAPEUTIC ACTIVITIES: CPT

## 2022-11-04 PROCEDURE — 6370000000 HC RX 637 (ALT 250 FOR IP): Performed by: PHYSICAL MEDICINE & REHABILITATION

## 2022-11-04 PROCEDURE — 1280000000 HC REHAB R&B

## 2022-11-04 PROCEDURE — 97116 GAIT TRAINING THERAPY: CPT

## 2022-11-04 RX ADMIN — ASPIRIN 81 MG 81 MG: 81 TABLET ORAL at 19:52

## 2022-11-04 RX ADMIN — SENNOSIDES AND DOCUSATE SODIUM 2 TABLET: 50; 8.6 TABLET ORAL at 19:52

## 2022-11-04 RX ADMIN — OXYCODONE HYDROCHLORIDE 10 MG: 10 TABLET ORAL at 23:39

## 2022-11-04 RX ADMIN — ATORVASTATIN CALCIUM 10 MG: 10 TABLET, FILM COATED ORAL at 07:56

## 2022-11-04 RX ADMIN — OXYCODONE 5 MG: 5 TABLET ORAL at 19:24

## 2022-11-04 RX ADMIN — OXYCODONE 5 MG: 5 TABLET ORAL at 04:17

## 2022-11-04 RX ADMIN — POLYETHYLENE GLYCOL 3350 17 G: 17 POWDER, FOR SOLUTION ORAL at 07:55

## 2022-11-04 RX ADMIN — ASPIRIN 81 MG 81 MG: 81 TABLET ORAL at 07:56

## 2022-11-04 RX ADMIN — LORAZEPAM 2 MG: 1 TABLET ORAL at 16:23

## 2022-11-04 RX ADMIN — SENNOSIDES AND DOCUSATE SODIUM 2 TABLET: 50; 8.6 TABLET ORAL at 07:56

## 2022-11-04 RX ADMIN — LORAZEPAM 2 MG: 1 TABLET ORAL at 23:40

## 2022-11-04 RX ADMIN — OXYCODONE HYDROCHLORIDE 10 MG: 10 TABLET ORAL at 08:33

## 2022-11-04 RX ADMIN — LORAZEPAM 2 MG: 1 TABLET ORAL at 04:17

## 2022-11-04 RX ADMIN — ENOXAPARIN SODIUM 40 MG: 100 INJECTION SUBCUTANEOUS at 18:24

## 2022-11-04 RX ADMIN — OXYCODONE 5 MG: 5 TABLET ORAL at 13:38

## 2022-11-04 RX ADMIN — MAGNESIUM HYDROXIDE 30 ML: 400 SUSPENSION ORAL at 07:57

## 2022-11-04 ASSESSMENT — PAIN DESCRIPTION - DESCRIPTORS
DESCRIPTORS: ACHING;DISCOMFORT
DESCRIPTORS: ACHING;DISCOMFORT;SHARP
DESCRIPTORS: ACHING
DESCRIPTORS: ACHING;DISCOMFORT;SORE

## 2022-11-04 ASSESSMENT — PAIN - FUNCTIONAL ASSESSMENT
PAIN_FUNCTIONAL_ASSESSMENT: ACTIVITIES ARE NOT PREVENTED

## 2022-11-04 ASSESSMENT — PAIN DESCRIPTION - DIRECTION
RADIATING_TOWARDS: LEG

## 2022-11-04 ASSESSMENT — PAIN SCALES - GENERAL
PAINLEVEL_OUTOF10: 2
PAINLEVEL_OUTOF10: 5
PAINLEVEL_OUTOF10: 4
PAINLEVEL_OUTOF10: 9
PAINLEVEL_OUTOF10: 0
PAINLEVEL_OUTOF10: 5
PAINLEVEL_OUTOF10: 5
PAINLEVEL_OUTOF10: 7
PAINLEVEL_OUTOF10: 2
PAINLEVEL_OUTOF10: 5
PAINLEVEL_OUTOF10: 4

## 2022-11-04 ASSESSMENT — PAIN DESCRIPTION - LOCATION
LOCATION: HIP
LOCATION: LEG
LOCATION: LEG
LOCATION: HIP
LOCATION: LEG
LOCATION: HIP

## 2022-11-04 ASSESSMENT — PAIN DESCRIPTION - ORIENTATION
ORIENTATION: RIGHT

## 2022-11-04 ASSESSMENT — PAIN DESCRIPTION - FREQUENCY
FREQUENCY: INTERMITTENT

## 2022-11-04 ASSESSMENT — PAIN DESCRIPTION - PAIN TYPE
TYPE: SURGICAL PAIN

## 2022-11-04 ASSESSMENT — PAIN DESCRIPTION - ONSET
ONSET: ON-GOING

## 2022-11-04 NOTE — CARE COORDINATION
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     PRIOR LEVEL OF FUNCTIONING: Independent           PERSONAL CARE:  Independent                                                                         DRIVES: Active                                                                      FINANCES: Manages her own     RX: Jeanmarie Cespedes in [address]:  shares with family                                   GROCERY SHOPS: shares with family      DME CURRENTLY AT [address] [place]  DIRECTIVES:ACP completed  by Puja Solorzano 11/2/22      TEAM CONFERENCE DAY: Tuesday

## 2022-11-04 NOTE — PROGRESS NOTES
Physical Therapy  Facility/Department: [facility]  Rehabilitation Physical Therapy Treatment Note    NAME: Erin Ratliff  : 1950 (39 y.o.)  MRN: [id number]  CODE STATUS: Full Code    Date of Service: 22       Restrictions:  Restrictions/Precautions: Weight Bearing; Fall Risk  Lower Extremity Weight Bearing Restrictions  Right Lower Extremity Weight Bearing: Weight Bearing As Tolerated  Position Activity Restriction  Other position/activity restrictions: WBAT no precautions per Dr Maurice Bhatia, reg diet     Pertinent medical information:  Additional Pertinent Hx: here due to fall in bathroom at home - found to have right hip fracture, R MACI    SUBJECTIVE  Subjective  Subjective: Patient reports she has been waking up confused and feeling like she is at home and something is wrong with the noise in the hallways. She states it takes her awhile to wake fully and reorient herself. Pain: Patient reporting her pain is a 6/10 in right thigh with mobility, but she does state the pain subsides with rest.    Social/Functional History  Lives With: Daughter (son in law and grand children.  family works during the day)  Type of Home: [facility]: Two level, Bed/Bath upstairs (laundry room on 2nd fl; has 1/2 bath on 1st fl)  Home Access: Stairs to enter without rails  Entrance Stairs - Number of Steps: 1+1 in front, 2 RENITA in back, pt usually goes in from the front (7+6 steps w/ 1 HR to upstairs)  Bathroom Shower/Tub: Tub/Shower unit  Bathroom Toilet: Standard  Bathroom Accessibility: Not accessible (doesn't think RW will fit)  Has the patient had two or more falls in the past year or any fall with injury in the past year?: Yes (had fallen down a flight of steps after back surgery)  ADL Assistance: Independent  Homemaking Assistance: Independent (shares)  Ambulation Assistance: Independent  Transfer Assistance: Independent  Active : Yes  Mode of Transportation: Car  Occupation: Retired  Type of Occupation:   Leisure & Hobbies: takes care of grandson with autism, watches TV, is a homebody  IADL Comments: pt does cook for herself  Additional Comments: pt alone during the day until her autistic grandson arrives from school        OBJECTIVE       Functional Mobility  Balance  Sitting Balance: Modified independent   Standing Balance: Stand by assistance  Transfers  Surface: To chair with arms;From chair with arms  Sit to Stand  Assistance Level: Stand by assist;Contact guard assist  Skilled Clinical Factors: to wheeled walker  Stand to Sit  Assistance Level: Stand by assist;Contact guard assist  Bed To/From Chair  Technique: Stand step  Assistance Level: Contact guard assist;Stand by assist  Skilled Clinical Factors: with wheeled walker      Environmental Mobility  Ambulation  Surface: Level surface  Device: Rolling walker  Distance: 80' with three turns, short distances in therapy gym, 100' back to room. Activity: Within Unit  Activity Comments: begins very antalgic but improves with distance, flexed posture of trunk  Assistance Level: Contact guard assist;Stand by assist    Stairs  Stair Height: 6''  Device: Bilateral handrails  Number of Stairs: 8  Additional Factors: Verbal cues; Non-reciprocal going up;Non-reciprocal going down  Assistance Level: Contact guard assist;Stand by assist  Skilled Clinical Factors: Patient began to attempt reciprocally, but PT stopped patient and educated her on non-reciprocal pattern. Patient then completed 8 steps but did not feel she could do more.     Curb  Curb Height: 6''  Device: Rolling walker  Number of Curbs: 1  Additional Factors: Verbal cues  Assistance Level: Contact guard assist  Skilled Clinical Factors: Patient required step by step instruction and was able to complete with CGA             PT Exercises  Exercise Treatment: patient performed 15 reps bilateral LE ther ex while seated in wheelchair: JAYSON Frye (PT supported thigh of right LE for last 7 secondary to increasing pain), hip add sets, hip abduction, and marching (min assist for right LE to increase ROM)      ASSESSMENT/PROGRESS TOWARDS GOALS       Assessment  Assessment: Ms. Lauri Patel continues to be limited by her pain, but she has been able to increase mobility. She is SBA-CGA for transfers and is ambulating 80' with wheeled walker with CGA-SBA. She was able to complete 8 stairs with two rails with CGA-SBA, but she requires cueing for safe sequencing on stairs. Patient continues to be below baseline and will benefit from continued skilled therapy for strengthening, gait training, and functional mobility training to allow for safe return home with family. Activity Tolerance: Patient tolerated treatment well;Patient limited by pain  Discharge Recommendations: Continue to assess pending progress; Patient would benefit from continued therapy after discharge  PT Equipment Recommendations  Equipment Needed: Yes  Mobility Devices: Ginger Mimes: Rolling    PM Session  Patient started bedside. She reports her shower went well and helped her pain, but she is still rating the pain at a 5/10. She is requesting her pain medication and RN, Kimber Mejia was notified. Sit<>stand with SBA  Patient performed weight shifting L<>R  with bilateral UE support on walker, supervision for 20 reps. 10 reps alternating shoulder flexion, standing with walker, but was able to tolerate no UE support. Patient then stood and tossed ball back and forth with another therapist with SBA. Patient was able to reach minimally out of SEGUNDO without LOB. Patient completed 2 minutes before taking seated rest break and was able to complete the full time without UE support. Sit<>stand with SBA  Ambulated 160' with wheeled walker with SBA. Patient began with more fluid pattern, but antalgic pattern increased with increased distance.    Patient performed 15 reps bilateral LE ther ex while seated in wheelchair: LAQ with right thigh supported, heel/toe raises, hip add sets, hip abduction without resistance, GS. Sit<>stand with SBA  Patient ambulated back to room with wheeled walker with SBA  Sit>supine with SBA though difficult to lift right LE into the bed. Patient limited by pain this PM, but RN did give meds towards end of session. Safety Device - Type of devices:  [x]  All fall risk precautions in place [x] Bed alarm in place  [x] Call light within reach [] Chair alarm in place [] Positioning belt [x] Gait belt [] Patient at risk for falls [x] Left in bed [] Left in chair [] Telesitter in use [] Sitter present [] Nurse notified []  None            Goals  Patient Goals   Patient Goals : pain relief and get home  Short Term Goals  Time Frame for Short Term Goals: 7-10 days  Short Term Goal 1: bed mobility modif I  Short Term Goal 2: all transfers modif I  Short Term Goal 3: amb >80' modif I with wh walker  Short Term Goal 4: 12 steps R rail modif I  Short Term Goal 5: HEP I  Long Term Goals  Time Frame for Long Term Goals : STG=LTG    PLAN OF CARE/SAFETY  Physcial Therapy Plan  General Plan:  minutes of therapy at least 5 out of 7 days a week  Current Treatment Recommendations: Strengthening;ROM; Functional mobility training;Transfer training;ADL/Self-care training;Gait training;Positioning;Modalities; Therapeutic activities; Patient/Caregiver education & training  Safety Devices  Type of Devices: All fall risk precautions in place;Gait belt;Left in chair (in wheelchair to return to room with transportation)    EDUCATION  Education  Education Given To: Patient  Education Provided: Safety; Mobility Training;Transfer Training  Education Method: Demonstration;Verbal  Barriers to Learning: None  Education Outcome: Verbalized understanding;Demonstrated understanding;Continued education needed        Therapy Time   Individual Concurrent Group Co-treatment   Time In 0815         Time Out 0900         Minutes 39              Second Session Therapy Time     Individual Co-treatment   Time In 1300     Time Out 1345     Minutes [address] Corey Fernandes, [id number], 11/04/22 at 9:03 AM

## 2022-11-04 NOTE — PLAN OF CARE
Problem: Discharge Planning  Goal: Discharge to home or other facility with appropriate resources  Outcome: Progressing  Flowsheets (Taken 11/4/2022 0805)  Discharge to home or other facility with appropriate resources: Identify barriers to discharge with patient and caregiver     Problem: Safety - Adult  Goal: Free from fall injury  Outcome: Progressing  Flowsheets (Taken 11/4/2022 1055)  Free From Fall Injury: Instruct family/caregiver on patient safety     Problem: ABCDS Injury Assessment  Goal: Absence of physical injury  Outcome: Progressing  Flowsheets (Taken 11/4/2022 1055)  Absence of Physical Injury: Implement safety measures based on patient assessment     Problem: Pain  Goal: Verbalizes/displays adequate comfort level or baseline comfort level  Outcome: Progressing

## 2022-11-04 NOTE — PROGRESS NOTES
Dr Susanna Corrigan, reg diet  Right Lower Extremity Weight Bearing: Weight Bearing As Tolerated    Subjective  Subjective: met in room, seated in w/c; agreeable for shower  Restrictions/Precautions: Weight Bearing; Fall Risk             Objective     Cognition  Overall Cognitive Status: Exceptions  Following Commands:  (is impulsive, safety concerns)  Attention Span: Attends with cues to redirect  Safety Judgement: Decreased awareness of need for safety;Decreased awareness of need for assistance  Insights: Decreased awareness of deficits  Initiation: Does not require cues  Sequencing: Requires cues for some  Cognition Comment: pt w/ flat affect, is impulsive & restless--gets up without RW or OT present  Orientation  Overall Orientation Status: Within Functional Limits         ADL  Feeding  Assistance Level:  Independent  Grooming/Oral Hygiene  Assistance Level: Modified independent  Skilled Clinical Factors: stood at sink for oral care  Upper Extremity Bathing  Assistance Level: Set-up  Skilled Clinical Factors: while seated on shower chair, OT managed water on/off & adjusted temp; she was able to wash, rinse & dry UB w/ set up  Lower Extremity Bathing  Equipment Provided: Long-handled sponge  Assistance Level: Contact guard assist  Skilled Clinical Factors: provided cues to remain seated for majority of LB bathing & using LH sponge to wash feet to reduce amount of bending over; stood w/ CGA to wash rinse & dry lars areas, cues to keep 1 hand on GB AATs to reduce fall risk, a little impulsive; did not dry off her calves or feet completely despite cues, fatigue noted  Upper Extremity Dressing  Assistance Level: Set-up  Skilled Clinical Factors: seated to doff/don shirt  Lower Extremity Dressing  Equipment Provided: Reachers  Assistance Level: Contact guard assist  Skilled Clinical Factors: able to doff clothing from L foot, shown how to use reacher off R foot; continues to bend over & attempt crossing R leg but increased pain/whincing noted--OT provided tactile cues to use reacher to assist; needs to dress R LE first d/t weakness, stood w/ close SB/CGA to manage clothing over hips using B hands  Putting On/Taking Off Footwear  Assistance Level: Moderate assistance  Skilled Clinical Factors: she is able to doff non skid socks, OT donned them and adilia gant d/t fatigue; will educate pt how to use SA in PM session  Toileting  Assistance Level: Stand by assist  Skilled Clinical Factors: mild impulsiveness, managed clothing using B hands, safety concerns while standing unsupported  Toilet Transfers  Equipment: Grab bars  Additional Factors: Cues for hand placement  Assistance Level: Stand by assist  Skilled Clinical Factors: SBA on/off toilet, cues to step R leg forward to reduce amount of pain w/ descent  Tub/Shower Transfers  Type: Shower  Transfer From: Rolling walker  Transfer To: Shower chair with back  Additional Factors: With handrails  Assistance Level: Contact guard assist  Skilled Clinical Factors: CGA to use RW into shower stall & to transfer to/from shower chair, used GB when SPT into w/c          Functional Mobility  Device: Rolling walker  Activity: To/From bathroom  Assistance Level: Contact guard assist  Skilled Clinical Factors: close SB/CGA to use RW to/from bathrm, tactile cues to transition hands to GB to side step on/off shower chair, less impulsivity noted  Bed Mobility  Overall Assistance Level: Stand By Assist  Additional Factors: Head of bed flat; With handrails  Sit to Supine  Assistance Level: Stand by assist  Skilled Clinical Factors: able to lift R LE onto bed  Transfers  Surface: Wheelchair  Sit to Stand  Assistance Level: Contact guard assist  Skilled Clinical Factors: close SB/CGA  Stand to Sit  Assistance Level: Contact guard assist  Bed To/From Chair  Assistance Level: Stand by assist;Contact guard assist  Skilled Clinical Factors: close SB to CGA, tactile cues to stay in frame of RW & not back up prematurely to surfaces  Stand Pivot  Skilled Clinical Factors: CGA shower chair to w/c using GB, tactile cue to guide R hand to armrest         Assessment  Assessment  Assessment: pt making steady gains w/ OT intervention. She is reporting faituge & 5/10 R hip pain (RN aware but pt not due x 3 more hrs). She was educated on how to use LH sponge & reacher to assist w/ LB ADLs & to remain seated on shower chair for energy conservation & reducing fall risk; pt given cues to avoid bending over or trying to cross R LE d/t increased pain & whincing (no precautions however important to use A/E for dislocation risk). Overall she required close SB to [facility] for fxl mob in & out of bathrm, less impulsivity noted but cues for hand placement & stepping R LE forward prior to sitting down. She needed mod A to don R non skid sock, OT donned adilia hose. She performs sit to supine w/ SBA, cues to scoot to middle of bed. Will educate pt on use of SA to don R sock in PM session, cont w/ POC  Activity Tolerance: Patient limited by fatigue;Patient limited by pain  Discharge Recommendations: 5-7 sessions per week;Patient would benefit from continued therapy after discharge;Continue to assess pending progress  OT Equipment Recommendations  Other: TBD--does not own any DME--may need RW, TSF, reacher  Safety Devices  Safety Devices in place: Yes  Type of devices: Bed alarm in place;Gait belt;Call light within reach; Left in bed;Nurse notified    Patient Education  Education  Education Given To: Patient  Education Provided: ADL Function;Transfer Training; Safety; Energy Conservation;Equipment  Education Provided Comments: educated pt on use of LH sponge & reacher & to be seated for majority of LB ADLs to reduce fall risk, conserve energy; VCs for safe t/f, keeping 1 hand on GB during bathing & SPT  Education Method: Demonstration;Verbal  Barriers to Learning: None  Education Outcome: Verbalized understanding;Continued education needed  Skilled Clinical Factors: some impulsiveness, impaired STM    Plan  Occupational Therapy Plan  Times Per Week: 5-6  Times Per Day: Twice a day  Hours Per Day: 1.5 hours  Therapy Duration: 1 Week  Specific Instructions for Next Treatment: shower, loan A/E  Current Treatment Recommendations: Strengthening;ROM;Balance training;Functional mobility training; Endurance training;Pain management; Safety education & training;Self-Care / ADL; Home management training;Modalities; Positioning;Patient/Caregiver education & training;Equipment evaluation, education, & procurement    Goals  Patient Goals   Patient goals : \"get rid of this pain so I can sleep, walk & function properly\"  Short Term Goals  Time Frame for Short Term Goals: by 5 days pt will complete  Short Term Goal 1: toilet tasks w/ MI  Short Term Goal 2: household transfers w/ MI using RW  Short Term Goal 3: shower level bathing w/ set up using shower chair + GB  Short Term Goal 4: tolerate standing x 8-10 minutes during kitchen/IADL tasks w/ MI  Short Term Goal 5: UB & LB ADLs w/ MI using A/E prn  Additional Goals?: Yes  Long Term Goals  Time Frame for Long Term Goals : by 5 days pt will complete  Long Term Goal 1: complete caregiver education & address DME needs prior to DC  Long Term Goal 2: provide UE HEP for strengthening to improve IND w/ sit<>stand & overall strength to 4+/5        Therapy Time   Individual Concurrent Group PM-treatment   Time In 0915      1515   Time Out 1010      1600   Minutes 55      45   Timed Code Treatment Minutes: [address] OTR/L #[id number]

## 2022-11-04 NOTE — PROGRESS NOTES
Didier Henry  11/4/2022  [id number]    Chief Complaint: Closed right hip fracture, sequela    Subjective: Patient seen this AM.  Patient working in therapies to improve her transfers and gait. Patient needs standby to contact-guard assist for transfers and gait. Repeat labs yesterday look good and are stable. Patient allowed to be weightbearing as tolerated. Her pain is under control with her current medication. ROS: No N/V, SOB, chest pain, chills or fever. Objective:  Patient Vitals for the past 24 hrs:   BP Temp Temp src Pulse Resp SpO2 Height Weight   11/04/22 0417 -- -- -- -- 16 -- -- --   11/04/22 0400 (!) 148/84 98.2 °F (36.8 °C) Oral 84 16 91 % 5' 10\" (1.778 m) 149 lb 11.1 oz (67.9 kg)   11/03/22 2343 -- -- -- -- 16 -- -- --   11/03/22 1935 (!) 153/82 98.3 °F (36.8 °C) Oral 85 16 94 % -- --   11/03/22 1546 -- -- -- -- 16 -- -- --   11/03/22 1508 120/77 98 °F (36.7 °C) Oral 69 16 93 % -- --   11/03/22 1143 -- -- -- -- 18 -- -- --   11/03/22 1056 -- -- -- -- -- -- 5' 10\" (1.778 m) --   11/03/22 0845 127/71 97.5 °F (36.4 °C) Axillary 83 18 93 % -- --   11/03/22 0841 -- -- -- 69 16 92 % -- --     Gen: No distress, pleasant. HEENT: Normocephalic, atraumatic. CV: Regular rate and rhythm. Resp: No respiratory distress. Abd: Soft, nontender   Ext: No edema. Pain with right hip movement. Neuro: Alert, oriented, appropriately interactive.        Wt Readings from Last 3 Encounters:   11/04/22 149 lb 11.1 oz (67.9 kg)   10/31/22 138 lb 3.7 oz (62.7 kg)   11/09/20 117 lb 8.1 oz (53.3 kg)       Laboratory data:   Lab Results   Component Value Date    WBC 10.3 11/03/2022    HGB 10.3 (L) 11/03/2022    HCT 30.5 (L) 11/03/2022    MCV 96.9 11/03/2022     11/03/2022       Lab Results   Component Value Date/Time     11/03/2022 07:13 AM    K 4.0 11/03/2022 07:13 AM     11/03/2022 07:13 AM    CO2 29 11/03/2022 07:13 AM    BUN 19 11/03/2022 07:13 AM    CREATININE <0.5 11/03/2022 07:13 AM GLUCOSE 100 11/03/2022 07:13 AM    CALCIUM 8.2 11/03/2022 07:13 AM        Therapy progress:  PT  Position Activity Restriction  Other position/activity restrictions: WBAT no precautions per Dr Cristiana Mayfield, reg diet  Objective     Sit to Stand: Stand by assistance  Stand to Sit: Stand by assistance  Device: Rolling Walker  Assistance: Stand by assistance, Contact guard assistance  Distance: 73' x 2  OT     Toilet - Technique: Ambulating  Equipment Used: Grab bars  Toilet Transfers Comments: used GB, moves impulsively which impacts her safety      Body mass index is 21.48 kg/m². Assessment and Plan:     Angulated right neck femoral fracture-  S/P RIGHT total hip arthroplasty, 11/1, Dr. Yinka Jiang, oxycodone    History of lumbar spine surgery, chronic low back pain-  lorazepam, Tylenol     HLD- Lipitor, ASA     Bowels: Schedule Miralax + Senna S. Follow bowel movements. Enema or suppository if needed. Bladder: Check PVR x 3. [facility] if PVR > 200ml or if any volume is > 500 ml. Pain: Oxycodone is ordered prn.          Kenny Moreno MD 11/4/2022, 7:47 AM

## 2022-11-04 NOTE — PROGRESS NOTES
Patient admitted to rehab with R total hip. A/Ox4. Transfers with RWx1. Mobility restrictions: WBAT. On reg diet, tolerating well. Medications taken whole with thins. On ASA for DVT prophylaxis. Skin: intact except for surgical incision R Hip, scattered bruising. Oxygen: RA. LDA: No IV. Has been continent  of bowel and continent of bladder. LBM 10/31 (per pt). MOM given today. Chair/bed alarms in use and call light in reach.

## 2022-11-05 PROCEDURE — 6360000002 HC RX W HCPCS: Performed by: PHYSICAL MEDICINE & REHABILITATION

## 2022-11-05 PROCEDURE — 6370000000 HC RX 637 (ALT 250 FOR IP): Performed by: PHYSICAL MEDICINE & REHABILITATION

## 2022-11-05 PROCEDURE — 94760 N-INVAS EAR/PLS OXIMETRY 1: CPT

## 2022-11-05 PROCEDURE — 97535 SELF CARE MNGMENT TRAINING: CPT

## 2022-11-05 PROCEDURE — 6370000000 HC RX 637 (ALT 250 FOR IP): Performed by: ORTHOPAEDIC SURGERY

## 2022-11-05 PROCEDURE — 97530 THERAPEUTIC ACTIVITIES: CPT

## 2022-11-05 PROCEDURE — 97110 THERAPEUTIC EXERCISES: CPT

## 2022-11-05 PROCEDURE — 1280000000 HC REHAB R&B

## 2022-11-05 RX ORDER — ACETAMINOPHEN 500 MG
1000 TABLET ORAL EVERY 8 HOURS SCHEDULED
Status: DISCONTINUED | OUTPATIENT
Start: 2022-11-05 | End: 2022-11-07

## 2022-11-05 RX ORDER — METHOCARBAMOL 500 MG/1
500 TABLET, FILM COATED ORAL 4 TIMES DAILY
Status: DISCONTINUED | OUTPATIENT
Start: 2022-11-05 | End: 2022-11-09 | Stop reason: HOSPADM

## 2022-11-05 RX ADMIN — OXYCODONE 5 MG: 5 TABLET ORAL at 07:11

## 2022-11-05 RX ADMIN — LORAZEPAM 2 MG: 1 TABLET ORAL at 22:12

## 2022-11-05 RX ADMIN — METHOCARBAMOL 500 MG: 500 TABLET ORAL at 19:55

## 2022-11-05 RX ADMIN — LORAZEPAM 2 MG: 1 TABLET ORAL at 16:13

## 2022-11-05 RX ADMIN — ACETAMINOPHEN 1000 MG: 500 TABLET ORAL at 14:52

## 2022-11-05 RX ADMIN — OXYCODONE 5 MG: 5 TABLET ORAL at 11:58

## 2022-11-05 RX ADMIN — ASPIRIN 81 MG 81 MG: 81 TABLET ORAL at 07:11

## 2022-11-05 RX ADMIN — METHOCARBAMOL 500 MG: 500 TABLET ORAL at 16:13

## 2022-11-05 RX ADMIN — ATORVASTATIN CALCIUM 10 MG: 10 TABLET, FILM COATED ORAL at 07:11

## 2022-11-05 RX ADMIN — POLYETHYLENE GLYCOL 3350 17 G: 17 POWDER, FOR SOLUTION ORAL at 07:11

## 2022-11-05 RX ADMIN — SENNOSIDES AND DOCUSATE SODIUM 2 TABLET: 50; 8.6 TABLET ORAL at 07:11

## 2022-11-05 RX ADMIN — METHOCARBAMOL 500 MG: 500 TABLET ORAL at 11:58

## 2022-11-05 RX ADMIN — LORAZEPAM 2 MG: 1 TABLET ORAL at 07:21

## 2022-11-05 RX ADMIN — ASPIRIN 81 MG 81 MG: 81 TABLET ORAL at 19:59

## 2022-11-05 RX ADMIN — ENOXAPARIN SODIUM 40 MG: 100 INJECTION SUBCUTANEOUS at 16:12

## 2022-11-05 RX ADMIN — SENNOSIDES AND DOCUSATE SODIUM 2 TABLET: 50; 8.6 TABLET ORAL at 20:00

## 2022-11-05 RX ADMIN — ACETAMINOPHEN 1000 MG: 500 TABLET ORAL at 22:12

## 2022-11-05 RX ADMIN — ONDANSETRON 4 MG: 4 TABLET, ORALLY DISINTEGRATING ORAL at 07:21

## 2022-11-05 ASSESSMENT — PAIN DESCRIPTION - ORIENTATION
ORIENTATION: RIGHT

## 2022-11-05 ASSESSMENT — PAIN SCALES - GENERAL
PAINLEVEL_OUTOF10: 5
PAINLEVEL_OUTOF10: 5
PAINLEVEL_OUTOF10: 3
PAINLEVEL_OUTOF10: 3
PAINLEVEL_OUTOF10: 5
PAINLEVEL_OUTOF10: 4
PAINLEVEL_OUTOF10: 5
PAINLEVEL_OUTOF10: 5
PAINLEVEL_OUTOF10: 2
PAINLEVEL_OUTOF10: 3
PAINLEVEL_OUTOF10: 2
PAINLEVEL_OUTOF10: 2
PAINLEVEL_OUTOF10: 5

## 2022-11-05 ASSESSMENT — PAIN DESCRIPTION - LOCATION
LOCATION: HIP
LOCATION: LEG
LOCATION: HIP

## 2022-11-05 ASSESSMENT — PAIN - FUNCTIONAL ASSESSMENT
PAIN_FUNCTIONAL_ASSESSMENT: ACTIVITIES ARE NOT PREVENTED
PAIN_FUNCTIONAL_ASSESSMENT: ACTIVITIES ARE NOT PREVENTED
PAIN_FUNCTIONAL_ASSESSMENT: PREVENTS OR INTERFERES WITH MANY ACTIVE NOT PASSIVE ACTIVITIES
PAIN_FUNCTIONAL_ASSESSMENT: PREVENTS OR INTERFERES SOME ACTIVE ACTIVITIES AND ADLS
PAIN_FUNCTIONAL_ASSESSMENT: ACTIVITIES ARE NOT PREVENTED
PAIN_FUNCTIONAL_ASSESSMENT: ACTIVITIES ARE NOT PREVENTED

## 2022-11-05 ASSESSMENT — PAIN DESCRIPTION - DESCRIPTORS
DESCRIPTORS: SHARP
DESCRIPTORS: ACHING;SORE;PRESSURE
DESCRIPTORS: ACHING
DESCRIPTORS: SORE;ACHING
DESCRIPTORS: SORE;ACHING
DESCRIPTORS: ACHING

## 2022-11-05 NOTE — PLAN OF CARE
Patient in bed with no c/o pain or discomfort. PRN medications given per order per patient request for pain and anxiety. No adverse effects noted. Call light within reach and all needs currently met.   Problem: Discharge Planning  Goal: Discharge to home or other facility with appropriate resources  11/5/2022 0040 by Marcus Dove RN  Outcome: Progressing  Flowsheets (Taken 11/4/2022 1945)  Discharge to home or other facility with appropriate resources: Identify barriers to discharge with patient and caregiver  11/4/2022 1057 by Chrisandra Fothergill, RN  Outcome: Progressing  Flowsheets (Taken 11/4/2022 0805)  Discharge to home or other facility with appropriate resources: Identify barriers to discharge with patient and caregiver     Problem: Safety - Adult  Goal: Free from fall injury  11/5/2022 0040 by Marcus Dove RN  Outcome: Progressing  11/4/2022 1057 by Chrisandra Fothergill, RN  Outcome: Progressing  [address] (Taken 11/4/2022 1055)  Free From Fall Injury: Instruct family/caregiver on patient safety     Problem: ABCDS Injury Assessment  Goal: Absence of physical injury  11/5/2022 0040 by Marcus Dove RN  Outcome: Progressing  11/4/2022 1057 by Chrisandra Fothergill, RN  Outcome: Progressing  Flowsheets (Taken 11/4/2022 1055)  Absence of Physical Injury: Implement safety measures based on patient assessment     Problem: Pain  Goal: Verbalizes/displays adequate comfort level or baseline comfort level  11/5/2022 0040 by Marcus Dove RN  Outcome: Progressing  11/4/2022 1057 by Chrisandra Fothergill, RN  Outcome: Progressing

## 2022-11-05 NOTE — PROGRESS NOTES
Orthopaedic Surgery Progress Note     ID: [id number] y. o.yo female Status Post RIGHT Total Hip Arthroplasty by Dr. Osiel Rey for fracture    Subjective:   No complaints. Pain controlled. Tolerating PO. Denies numbness/paresthesias. Denies CP/SOB/N/V. Objective:   Vital signs in last 24 hours:   [unfilled]   Vitals reviewed as above      R hip dressing c/d/i   +TA/EHL/GSC   Fires quad   Thigh soft & compressible    SILT s/s/dp/sp/t n. dist    Toes wwp     Data Review   CBC:   Lab Results   Component Value Date/Time    WBC 10.3 11/03/2022 07:13 AM    HGB 10.3 11/03/2022 07:13 AM    HCT 30.5 11/03/2022 07:13 AM     11/03/2022 07:13 AM        Assessment/Plan:   POD 4 s/p MACI  Recovering well.       Plan of care:   Weight bearing status- as tolerated on operative extremity   Precautions- none   Pain control- Tylenol 1000mg TID, oxycodone PRN added robaxin   Wound care- monocryl, aquacel   DVT ppx- ASA BID x 6 weeks

## 2022-11-05 NOTE — PROGRESS NOTES
Physical Therapy  Attempted physical therapy treatment for second time today. Pt just finished with OT and adamantly refuses any further treatment today. Provided maximum encouragement, pt continues to decline despite this. 58 minute variance recorded.   Electronically signed by Zoë Allen, PT [id number] 11/5/22 at 2:50 PM EDT

## 2022-11-05 NOTE — PROGRESS NOTES
Physical Therapy  Facility/Department: [facility]  Rehabilitation Physical Therapy Treatment Note    NAME: Sosa Edmondson  : 1950 (34 y.o.)  MRN: [id number]  CODE STATUS: Full Code    Date of Service: 22       Restrictions:  Restrictions/Precautions: Weight Bearing; Fall Risk  Lower Extremity Weight Bearing Restrictions  Right Lower Extremity Weight Bearing: Weight Bearing As Tolerated  Position Activity Restriction  Other position/activity restrictions: WBAT no precautions per Dr Shefali Lazcano, reg diet     SUBJECTIVE  Subjective  Subjective: Pt reports she woke up and was very nauseated, she was given anti-nausea medication but it does not seem to be working per pt. Pt agreed to bed exercises, and I did assist her up to the bathroom. That was all she wanted to perform this morning. Told pt I will check back with her in the afternoon to see if we can do a little more activity. Pain: Pt reports 6/10 pain in right thigh once we go up and moving. Pt had questions if she was due for her pain meds yet, talked with RN who reports she will be due in about 30 minutes and will give meds then, alerted pt of this. Pt reports pain  did decrease once she was back in the bed.         Post Treatment Pain Screening         OBJECTIVE  Cognition  Overall Cognitive Status: Exceptions  Following Commands:  (is impulsive, safety concerns)  Attention Span: Attends with cues to redirect  Safety Judgement: Decreased awareness of need for safety;Decreased awareness of need for assistance  Insights: Decreased awareness of deficits  Initiation: Does not require cues  Sequencing: Requires cues for some  Cognition Comment: pt w/ flat affect, is impulsive & restless--gets up without RW or OT present  Orientation  Overall Orientation Status: Within Functional Limits    Functional Mobility  Roll Right  Assistance Level: Modified independent (HOB elevated and use of rail)  Sit to Supine  Assistance Level: Stand by assist  Supine to Sit  Assistance Level: Stand by assist  Scooting  Assistance Level: Stand by assist  Balance  Sitting Balance: Modified independent   Standing Balance: Stand by assistance  Transfers  Surface: To bed;From bed;Standard toilet  Additional Factors: Hand placement cues  Device: Walker (rolling)  Sit to Stand  Assistance Level: Stand by assist;Contact guard assist  Skilled Clinical Factors: to wheeled walker  Stand to Sit  Assistance Level: Stand by assist;Contact guard assist  Bed To/From Chair  Technique: Stand step  Assistance Level: Contact guard assist;Stand by assist  Skilled Clinical Factors: with wheeled walker      Environmental Mobility  Ambulation  Surface: Level surface  Device: Rolling walker  Distance: 15 feet x 2, to and from bathroom in room. Limited gait due to pt nauseated. Activity: Within Room  Activity Comments: antalgic pattern with flexed posture  Assistance Level: Contact guard assist;Stand by assist  Gait Deviations: Decreased step length bilateral;Decreased weight shift right             PT Exercises  Exercise Treatment: Pt performed 10 reps of supine AP's, heel slide, and hip abduction. ASSESSMENT/PROGRESS TOWARDS GOALS       Assessment  Assessment: Pt is limited this morning due to nausea, she was given anti-nausea medications prior to therapy but she reports these are not helping. Mobility is very limited due to this, pt did get OOB to use the bathroom, but that was the only functional mobility she was willing to perform at this time. Pt continues to be below baseline and will benefit from continued skilled therapy to address impaired strength, and functional mobility to allow a safe return to home with family. Activity Tolerance: Patient limited by fatigue;Patient limited by pain; Other (comment) (Limited by nausea)  Discharge Recommendations: Continue to assess pending progress; Patient would benefit from continued therapy after discharge  PT Equipment Recommendations  Equipment Needed: Yes  Mobility Devices: Coltonn Chanteles: Rolling    Goals  Patient Goals   Patient Goals : pain relief and get home  Short Term Goals  Time Frame for Short Term Goals: 7-10 days  Short Term Goal 1: bed mobility modif I  Short Term Goal 2: all transfers modif I  Short Term Goal 3: amb >80' modif I with wh walker  Short Term Goal 4: 12 steps R rail modif I  Short Term Goal 5: HEP I  Long Term Goals  Time Frame for Long Term Goals : STG=LTG    PLAN OF CARE/SAFETY  Physcial Therapy Plan  General Plan:  minutes of therapy at least 5 out of 7 days a week  Current Treatment Recommendations: Strengthening;ROM; Functional mobility training;Transfer training;ADL/Self-care training;Gait training;Positioning;Modalities; Therapeutic activities; Patient/Caregiver education & training  Safety Devices  Type of Devices: All fall risk precautions in place;Gait belt;Bed alarm in place;Call light within reach; Patient at risk for falls; Left in bed;Nurse notified  Restraints  Restraints Initially in Place: No    EDUCATION  Education  Education Given To: Patient  Education Provided: Plan of Care;Home Exercise Program;Mobility Training;Transfer Training; Safety  Education Method: Verbal  Barriers to Learning: None  Education Outcome: Verbalized understanding;Demonstrated understanding;Continued education needed        Therapy Time   Individual Concurrent Group Co-treatment   Time In 1038         Time Out 1110         Minutes 32           Timed Code Treatment Minutes: Garland Wooten PT, 11/05/22 at 11:11 AM

## 2022-11-05 NOTE — PROGRESS NOTES
Occupational Therapy  Facility/Department: [facility]  Rehabilitation Occupational Therapy Daily Treatment Note    Date: 22  Patient Name: Bear Bermudez       Room: S9I-4454/0436-22  MRN: [id number]  Account: [de-identified]   : 1950  (69 y.o.) Gender: female                    Past Medical History:  has a past medical history of Arthritis, Chronic back pain, Hyperlipidemia, and Restless leg syndrome. Past Surgical History:   has a past surgical history that includes back surgery; Tubal ligation; and Total hip arthroplasty (Right, 2022). Restrictions  Restrictions/Precautions: Weight Bearing; Fall Risk  Other position/activity restrictions: WBAT no precautions per Dr Laurie Moncada, reg diet  Right Lower Extremity Weight Bearing: Weight Bearing As Tolerated    Subjective  Subjective: met in room, she is resting quietly in bed, stated she is \"nauseated\" but didn't eat her lunch, appears pale  Restrictions/Precautions: Weight Bearing; Fall Risk             Objective     Cognition  Overall Cognitive Status: Exceptions  Following Commands:  (is impulsive, safety concerns)  Attention Span: Attends with cues to redirect  Safety Judgement: Decreased awareness of need for safety;Decreased awareness of need for assistance  Insights: Decreased awareness of deficits  Initiation: Does not require cues  Sequencing: Requires cues for some  Cognition Comment: pt w/ flat affect, is impulsive & restless--gets up without RW or OT present, pt can be forgetful  Orientation  Overall Orientation Status: Within Functional Limits         ADL  Feeding  Assistance Level:  Independent  Skilled Clinical Factors: able to drink water but declined to eat her lunch d/t nausea; had Zofran  Grooming/Oral Hygiene  Assistance Level: Modified independent  Skilled Clinical Factors: stood at sink for oral care & washing hands  Toileting  Assistance Level: Stand by assist  Skilled Clinical Factors: mild impulsiveness, managed clothing using B hands, safety concerns while standing unsupported  Toilet Transfers  Equipment: Grab bars  Additional Factors: Cues for hand placement  Assistance Level: Stand by assist  Skilled Clinical Factors: SBA on/off toilet, cues to step R leg forward to reduce amount of pain w/ descent  Tub/Shower Transfers  Skilled Clinical Factors: offered shower but declined,  had one yesterday; is feeling \"sick\" today          Functional Mobility  Device: Rolling walker  Activity: To/From bathroom  Assistance Level: Contact guard assist  Skilled Clinical Factors: close SB/CGA to use RW to/from bathrm, tactile cues to stay inside frame of RW throughout t/f  Bed Mobility  Overall Assistance Level: Stand By Assist  Additional Factors: Head of bed flat; With handrails  Sit to Supine  Assistance Level: Stand by assist  Skilled Clinical Factors: able to lift R LE onto bed  Supine to Sit  Assistance Level: Stand by assist  Scooting  Skilled Clinical Factors: cues, used rail  Transfers  Surface: To bed;From bed  Additional Factors: Hand placement cues  Device: Walker  Sit to Stand  Assistance Level: Contact guard assist  Skilled Clinical Factors: close SB/CGA using RW, cues for safe hand placement & sequencing  Stand to Sit  Assistance Level: Stand by assist   OT Exercises  Exercise Treatment: completed 1 set of 15 using 2 lb wts for strengthening exercises to improve IND w/ sit<>stand transitions  Static Sitting Balance Exercises: x 15 shld shrugs  Postural Correction Exercises: x 15 abdominal squeezes for core strengthening to support her balance while standing  Motor Control/Coordination: 2 sets of 20 using 3 lb gripper to improve  on handles of RW or Gbs during ADLs & t/f     Assessment  Assessment  Assessment: pt making steady gains w/ OT intervention. She is reporting nausea  & 5/10 R hip pain (RN aware but pt not due x 3 more hrs--already had combination of oxycodone, Robaxin and Zofran, now asking for Ativan but is not due). OT wrote down the times & frequencies of her medications however she doesn't seem to understand or recall when they are due again; educated on the harmful effects of taking Ativan along w/ these other medications including dizziness & AMS . She had to use the toilet twice during session, able to get out of bed w/ SBA, cues for pausing from supine to sit to reduce dizziness. Pt used RW to/from bathrm w/ close SB/CGA --tactile cues to keep RW w/ her thru entire transfer. She is able to stand at sink to wash hands & perform oral care IND'ly. She tolerated UE HEP using 2 lb wts for 1 set of 15 for each of the 5 exercises. Discussed the importance of using reacher to grab items off floor to reduce fall risk & bending over too far. Pt likely to be ready for DC early next wk, will need family education scheduled prior to DC. Anticipate she may benefit from TSF, shower chair & reacher. Recommend Home OT services to ensure IND w/ IADLs and fxl mobility using RW in home environment  Activity Tolerance: Patient limited by fatigue;Patient limited by pain  Discharge Recommendations: 5-7 sessions per week;Patient would benefit from continued therapy after discharge;Continue to assess pending progress  OT Equipment Recommendations  Other: TBD--does not own any DME--may need RW, TSF, reacher  Safety Devices  Safety Devices in place: Yes  Type of devices: Bed alarm in place;Gait belt;Call light within reach; Left in bed;Nurse notified    Patient Education  Education  Education Given To: Patient  Education Provided: Home Exercise Program;Transfer Training; Safety  Education Provided Comments: educated on UE HEP for strengthening, reviewed safe hand placement during t/f & use of RW  Education Method: Demonstration;Verbal  Barriers to Learning: None  Education Outcome: Verbalized understanding;Continued education needed  Skilled Clinical Factors: some impulsiveness, impaired STM    Plan  Occupational Therapy Plan  Times Per Week: 5-6  Times Per Day: Twice a day  Hours Per Day: 1.5 hours  Therapy Duration: 1 Week  Specific Instructions for Next Treatment: instruct & loan A/E, kitchen tasks w/ basket  Current Treatment Recommendations: Strengthening;ROM;Balance training;Functional mobility training; Endurance training;Pain management; Safety education & training;Self-Care / ADL; Home management training;Modalities; Positioning;Patient/Caregiver education & training;Equipment evaluation, education, & procurement    Goals  Patient Goals   Patient goals : \"get rid of this pain so I can sleep, walk & function properly\"  Short Term Goals  Time Frame for Short Term Goals: by 5 days pt will complete  Short Term Goal 1: toilet tasks w/ MI  Short Term Goal 2: household transfers w/ MI using RW  Short Term Goal 3: shower level bathing w/ set up using shower chair + GB  Short Term Goal 4: tolerate standing x 8-10 minutes during kitchen/IADL tasks w/ MI  Short Term Goal 5: UB & LB ADLs w/ MI using A/E prn  Additional Goals?: Yes  Long Term Goals  Time Frame for Long Term Goals : by 5 days pt will complete  Long Term Goal 1: complete caregiver education & address DME needs prior to DC  Long Term Goal 2: provide UE HEP for strengthening to improve IND w/ sit<>stand & overall strength to 4+/5        Therapy Time   Individual Concurrent Group Co-treatment   Time In 1230         Time Out 1400         Minutes 90         Timed Code Treatment Minutes: [address], [place] [id number]

## 2022-11-05 NOTE — PROGRESS NOTES
Patient admitted to rehab with right total hip. A/Ox4. Transfers with walker x1. Mobility restrictions: WBAT. On regular diet, tolerating well. Medications taken whole with thins. On ASA, Lovenox for DVT prophylaxis. Skin: surgical incision to right hip with prineoand generalized bruising. Oxygen: room air. LDA: NA. Has been continent of bowel and continent of bladder. LB 10/31. Chair/bed alarms in use and call light in reach. Will monitor for safety.

## 2022-11-06 PROCEDURE — 6360000002 HC RX W HCPCS: Performed by: PHYSICAL MEDICINE & REHABILITATION

## 2022-11-06 PROCEDURE — 6370000000 HC RX 637 (ALT 250 FOR IP): Performed by: ORTHOPAEDIC SURGERY

## 2022-11-06 PROCEDURE — 6370000000 HC RX 637 (ALT 250 FOR IP): Performed by: PHYSICAL MEDICINE & REHABILITATION

## 2022-11-06 PROCEDURE — 1280000000 HC REHAB R&B

## 2022-11-06 RX ADMIN — METHOCARBAMOL 500 MG: 500 TABLET ORAL at 16:36

## 2022-11-06 RX ADMIN — POLYETHYLENE GLYCOL 3350 17 G: 17 POWDER, FOR SOLUTION ORAL at 07:20

## 2022-11-06 RX ADMIN — ASPIRIN 81 MG 81 MG: 81 TABLET ORAL at 07:19

## 2022-11-06 RX ADMIN — LORAZEPAM 2 MG: 1 TABLET ORAL at 23:37

## 2022-11-06 RX ADMIN — ENOXAPARIN SODIUM 40 MG: 100 INJECTION SUBCUTANEOUS at 16:37

## 2022-11-06 RX ADMIN — OXYCODONE 5 MG: 5 TABLET ORAL at 20:33

## 2022-11-06 RX ADMIN — SENNOSIDES AND DOCUSATE SODIUM 2 TABLET: 50; 8.6 TABLET ORAL at 20:32

## 2022-11-06 RX ADMIN — LORAZEPAM 2 MG: 1 TABLET ORAL at 17:48

## 2022-11-06 RX ADMIN — METHOCARBAMOL 500 MG: 500 TABLET ORAL at 07:19

## 2022-11-06 RX ADMIN — ACETAMINOPHEN 1000 MG: 500 TABLET ORAL at 05:34

## 2022-11-06 RX ADMIN — OXYCODONE 5 MG: 5 TABLET ORAL at 11:49

## 2022-11-06 RX ADMIN — OXYCODONE HYDROCHLORIDE 10 MG: 10 TABLET ORAL at 06:34

## 2022-11-06 RX ADMIN — ASPIRIN 81 MG 81 MG: 81 TABLET ORAL at 20:32

## 2022-11-06 RX ADMIN — ATORVASTATIN CALCIUM 10 MG: 10 TABLET, FILM COATED ORAL at 07:20

## 2022-11-06 RX ADMIN — SENNOSIDES AND DOCUSATE SODIUM 2 TABLET: 50; 8.6 TABLET ORAL at 07:20

## 2022-11-06 RX ADMIN — OXYCODONE 5 MG: 5 TABLET ORAL at 16:36

## 2022-11-06 RX ADMIN — LORAZEPAM 2 MG: 1 TABLET ORAL at 10:15

## 2022-11-06 RX ADMIN — METHOCARBAMOL 500 MG: 500 TABLET ORAL at 20:32

## 2022-11-06 RX ADMIN — METHOCARBAMOL 500 MG: 500 TABLET ORAL at 11:50

## 2022-11-06 RX ADMIN — OXYCODONE HYDROCHLORIDE 10 MG: 10 TABLET ORAL at 02:12

## 2022-11-06 ASSESSMENT — PAIN SCALES - GENERAL
PAINLEVEL_OUTOF10: 3
PAINLEVEL_OUTOF10: 8
PAINLEVEL_OUTOF10: 2
PAINLEVEL_OUTOF10: 5
PAINLEVEL_OUTOF10: 4
PAINLEVEL_OUTOF10: 5
PAINLEVEL_OUTOF10: 5
PAINLEVEL_OUTOF10: 2
PAINLEVEL_OUTOF10: 0
PAINLEVEL_OUTOF10: 7
PAINLEVEL_OUTOF10: 3
PAINLEVEL_OUTOF10: 4
PAINLEVEL_OUTOF10: 6
PAINLEVEL_OUTOF10: 5
PAINLEVEL_OUTOF10: 0

## 2022-11-06 ASSESSMENT — PAIN DESCRIPTION - ORIENTATION
ORIENTATION: RIGHT
ORIENTATION: RIGHT;UPPER
ORIENTATION: UPPER;RIGHT
ORIENTATION: RIGHT
ORIENTATION: RIGHT;UPPER
ORIENTATION: RIGHT
ORIENTATION: RIGHT

## 2022-11-06 ASSESSMENT — PAIN SCALES - WONG BAKER
WONGBAKER_NUMERICALRESPONSE: 0
WONGBAKER_NUMERICALRESPONSE: 0

## 2022-11-06 ASSESSMENT — PAIN DESCRIPTION - PAIN TYPE: TYPE: SURGICAL PAIN

## 2022-11-06 ASSESSMENT — PAIN - FUNCTIONAL ASSESSMENT
PAIN_FUNCTIONAL_ASSESSMENT: ACTIVITIES ARE NOT PREVENTED
PAIN_FUNCTIONAL_ASSESSMENT: PREVENTS OR INTERFERES WITH MANY ACTIVE NOT PASSIVE ACTIVITIES
PAIN_FUNCTIONAL_ASSESSMENT: PREVENTS OR INTERFERES WITH MANY ACTIVE NOT PASSIVE ACTIVITIES
PAIN_FUNCTIONAL_ASSESSMENT: PREVENTS OR INTERFERES SOME ACTIVE ACTIVITIES AND ADLS
PAIN_FUNCTIONAL_ASSESSMENT: ACTIVITIES ARE NOT PREVENTED
PAIN_FUNCTIONAL_ASSESSMENT: ACTIVITIES ARE NOT PREVENTED

## 2022-11-06 ASSESSMENT — PAIN DESCRIPTION - LOCATION
LOCATION: HIP
LOCATION: HIP
LOCATION: LEG
LOCATION: HIP
LOCATION: LEG
LOCATION: LEG
LOCATION: HIP

## 2022-11-06 ASSESSMENT — PAIN DESCRIPTION - DESCRIPTORS
DESCRIPTORS: ACHING;DISCOMFORT
DESCRIPTORS: ACHING
DESCRIPTORS: ACHING;SORE
DESCRIPTORS: ACHING
DESCRIPTORS: ACHING;SORE
DESCRIPTORS: ACHING

## 2022-11-06 ASSESSMENT — PAIN DESCRIPTION - DIRECTION: RADIATING_TOWARDS: UPPER THIGH

## 2022-11-06 NOTE — PLAN OF CARE
Problem: Discharge Planning  Goal: Discharge to home or other facility with appropriate resources  Outcome: Progressing     Problem: Safety - Adult  Goal: Free from fall injury  11/5/2022 2325 by Meredeth Essex, RN  Outcome: Progressing  11/5/2022 1056 by Nishi Engel RN  Outcome: Progressing  Flowsheets (Taken 11/4/2022 1055 by Parrish Tinsley RN)  Free From Fall Injury: Instruct family/caregiver on patient safety     Problem: ABCDS Injury Assessment  Goal: Absence of physical injury  11/5/2022 2325 by Meredeth Essex, RN  Outcome: Progressing  11/5/2022 1056 by Nihsi Engel RN  Outcome: Progressing  Flowsheets (Taken 11/4/2022 1055 by Parrish Tinsley RN)  Absence of Physical Injury: Implement safety measures based on patient assessment     Problem: Pain  Goal: Verbalizes/displays adequate comfort level or baseline comfort level  11/5/2022 2325 by Meredeth Essex, RN  Outcome: Progressing  11/5/2022 1056 by Nishi Engel RN  Outcome: Progressing  Flowsheets (Taken 11/5/2022 1056)  Verbalizes/displays adequate comfort level or baseline comfort level:   Encourage patient to monitor pain and request assistance   Assess pain using appropriate pain scale   Implement non-pharmacological measures as appropriate and evaluate response   Administer analgesics based on type and severity of pain and evaluate response

## 2022-11-06 NOTE — PLAN OF CARE
Problem: Safety - Adult  Goal: Free from fall injury  11/6/2022 1052 by Chuy Luciano RN  Outcome: Progressing  Flowsheets (Taken 11/4/2022 1055 by Johana Borrero RN)  Free From Fall Injury: Instruct family/caregiver on patient safety     Problem: ABCDS Injury Assessment  Goal: Absence of physical injury  11/6/2022 1052 by Chuy Luciano RN  Outcome: Progressing  Flowsheets (Taken 11/4/2022 1055 by Johana Borrero RN)  Absence of Physical Injury: Implement safety measures based on patient assessment     Problem: Pain  Goal: Verbalizes/displays adequate comfort level or baseline comfort level  11/6/2022 1052 by Chuy Luciano RN  Outcome: Progressing  Flowsheets (Taken 11/6/2022 1052)  Verbalizes/displays adequate comfort level or baseline comfort level:   Encourage patient to monitor pain and request assistance   Assess pain using appropriate pain scale   Administer analgesics based on type and severity of pain and evaluate response   Implement non-pharmacological measures as appropriate and evaluate response     Problem: Musculoskeletal - Adult  Goal: Return mobility to safest level of function  Outcome: Progressing  Flowsheets (Taken 11/6/2022 0040 by Roney Baker RN)  Return Mobility to Safest Level of Function: Assess patient stability and activity tolerance for standing, transferring and ambulating with or without assistive devices     Problem: Anxiety  Goal: Will report anxiety at manageable levels  Description: INTERVENTIONS:  1. Administer medication as ordered  2. Teach and rehearse alternative coping skills  3.  Provide emotional support with 1:1 interaction with staff  Outcome: Progressing  Flowsheets (Taken 11/6/2022 1052)  Will report anxiety at manageable levels: Administer medication as ordered

## 2022-11-06 NOTE — PROGRESS NOTES
Patient refusing to sit up for breakfast or get out of bed. Will allow to rest and continue to monitor. Will try again later.

## 2022-11-06 NOTE — FLOWSHEET NOTE
Patient admitted to ARU with Dx of right total hip surgery. Alert and oriented x 5. Walks to BR with walker x1. Mobility restrictions: WBAT. On regular diet, tolerating well. Medications taken whole with thins. On ASA, Lovenox for DVT prophylaxis. Post surgical incision to right hip dressing is clean dry and intact. Oxygen: room air. Continent of bowel and continent of bladder. Asked for her prn ativan for anxiety at 10pm and had prn oxycodone 10mg after using the bathroom at 212am.  She Is on routine tylenol 1000mg every 8 hours. Chair/bed alarms in use and call light in reach. Calls appropriately.

## 2022-11-06 NOTE — PROGRESS NOTES
Patient admitted to rehab with closed right hip fracture . A/Ox4. Transfers with walker x1. Mobility restrictions: WBAT. On regular diet, tolerating well. Medications taken whole with thins. On ASA, lovenox for DVT prophylaxis. Skin: surgical incision to right hip with prineo and generalized bruising. Oxygen: room air. LDA: NA. Has been continent of bowel and continent of bladder. LBM 11/5 . Chair/bed alarms in use and call light in reach. Will monitor for safety.

## 2022-11-07 PROCEDURE — 97110 THERAPEUTIC EXERCISES: CPT

## 2022-11-07 PROCEDURE — 1280000000 HC REHAB R&B

## 2022-11-07 PROCEDURE — 6370000000 HC RX 637 (ALT 250 FOR IP): Performed by: ORTHOPAEDIC SURGERY

## 2022-11-07 PROCEDURE — 97535 SELF CARE MNGMENT TRAINING: CPT

## 2022-11-07 PROCEDURE — 97116 GAIT TRAINING THERAPY: CPT

## 2022-11-07 PROCEDURE — 6370000000 HC RX 637 (ALT 250 FOR IP): Performed by: PHYSICAL MEDICINE & REHABILITATION

## 2022-11-07 PROCEDURE — 97530 THERAPEUTIC ACTIVITIES: CPT

## 2022-11-07 PROCEDURE — 94760 N-INVAS EAR/PLS OXIMETRY 1: CPT

## 2022-11-07 RX ADMIN — METHOCARBAMOL 500 MG: 500 TABLET ORAL at 20:07

## 2022-11-07 RX ADMIN — ASPIRIN 81 MG 81 MG: 81 TABLET ORAL at 08:21

## 2022-11-07 RX ADMIN — METHOCARBAMOL 500 MG: 500 TABLET ORAL at 17:42

## 2022-11-07 RX ADMIN — ATORVASTATIN CALCIUM 10 MG: 10 TABLET, FILM COATED ORAL at 08:21

## 2022-11-07 RX ADMIN — OXYCODONE HYDROCHLORIDE 10 MG: 10 TABLET ORAL at 21:09

## 2022-11-07 RX ADMIN — OXYCODONE 5 MG: 5 TABLET ORAL at 01:41

## 2022-11-07 RX ADMIN — OXYCODONE HYDROCHLORIDE 10 MG: 10 TABLET ORAL at 16:32

## 2022-11-07 RX ADMIN — LORAZEPAM 2 MG: 1 TABLET ORAL at 05:57

## 2022-11-07 RX ADMIN — METHOCARBAMOL 500 MG: 500 TABLET ORAL at 08:21

## 2022-11-07 RX ADMIN — OXYCODONE 5 MG: 5 TABLET ORAL at 08:24

## 2022-11-07 RX ADMIN — METHOCARBAMOL 500 MG: 500 TABLET ORAL at 12:27

## 2022-11-07 RX ADMIN — LORAZEPAM 2 MG: 1 TABLET ORAL at 13:54

## 2022-11-07 RX ADMIN — OXYCODONE HYDROCHLORIDE 10 MG: 10 TABLET ORAL at 12:27

## 2022-11-07 RX ADMIN — ACETAMINOPHEN 1000 MG: 500 TABLET ORAL at 04:48

## 2022-11-07 RX ADMIN — SENNOSIDES AND DOCUSATE SODIUM 2 TABLET: 50; 8.6 TABLET ORAL at 08:21

## 2022-11-07 RX ADMIN — LORAZEPAM 2 MG: 1 TABLET ORAL at 20:07

## 2022-11-07 RX ADMIN — ASPIRIN 81 MG 81 MG: 81 TABLET ORAL at 20:09

## 2022-11-07 ASSESSMENT — PAIN - FUNCTIONAL ASSESSMENT
PAIN_FUNCTIONAL_ASSESSMENT: ACTIVITIES ARE NOT PREVENTED
PAIN_FUNCTIONAL_ASSESSMENT: PREVENTS OR INTERFERES WITH ALL ACTIVE AND SOME PASSIVE ACTIVITIES
PAIN_FUNCTIONAL_ASSESSMENT: PREVENTS OR INTERFERES WITH MANY ACTIVE NOT PASSIVE ACTIVITIES
PAIN_FUNCTIONAL_ASSESSMENT: PREVENTS OR INTERFERES SOME ACTIVE ACTIVITIES AND ADLS

## 2022-11-07 ASSESSMENT — PAIN DESCRIPTION - LOCATION
LOCATION: HIP
LOCATION: LEG
LOCATION: LEG
LOCATION: HIP

## 2022-11-07 ASSESSMENT — PAIN DESCRIPTION - DESCRIPTORS
DESCRIPTORS: ACHING
DESCRIPTORS: ACHING;DISCOMFORT
DESCRIPTORS: ACHING

## 2022-11-07 ASSESSMENT — PAIN DESCRIPTION - FREQUENCY
FREQUENCY: INTERMITTENT
FREQUENCY: INTERMITTENT

## 2022-11-07 ASSESSMENT — PAIN SCALES - GENERAL
PAINLEVEL_OUTOF10: 5
PAINLEVEL_OUTOF10: 5
PAINLEVEL_OUTOF10: 6
PAINLEVEL_OUTOF10: 6
PAINLEVEL_OUTOF10: 2
PAINLEVEL_OUTOF10: 6
PAINLEVEL_OUTOF10: 7
PAINLEVEL_OUTOF10: 5

## 2022-11-07 ASSESSMENT — PAIN DESCRIPTION - ORIENTATION
ORIENTATION: RIGHT

## 2022-11-07 ASSESSMENT — PAIN SCALES - WONG BAKER
WONGBAKER_NUMERICALRESPONSE: 0

## 2022-11-07 ASSESSMENT — PAIN DESCRIPTION - ONSET
ONSET: ON-GOING
ONSET: ON-GOING

## 2022-11-07 ASSESSMENT — PAIN DESCRIPTION - PAIN TYPE
TYPE: SURGICAL PAIN
TYPE: SURGICAL PAIN

## 2022-11-07 NOTE — PATIENT CARE CONFERENCE
[facility]  Inpatient Rehabilitation  Weekly Team Conference Note      Date: 2022  Patient Name:  Jc Casey    MRN: [id number]  : 1950  Gender:   Physician:   Diagnosis: Closed right hip fracture, sequela [S72.001S]    CASE MANAGEMENT  Assessment: goal is home      PHYSICAL THERAPY    Bed Mobility:  Overall Assistance Level: Modified Independent  Additional Factors: Head of bed flat, Without handrails  Sit>supine:  Assistance Level: Modified independent  Supine>sit:  Assistance Level: Modified independent    Transfers:  Surface: To bed, From bed, To chair with arms, From chair with arms  Additional Factors: Hand placement cues  Device: Walker  Sit>stand:  Assistance Level: Supervision  Skilled Clinical Factors: to wheeled walker  Stand>sit:  Assistance Level: Supervision  Bed<>chair  Technique: Stand step  Assistance Level: Supervision  Skilled Clinical Factors: with wheeled walker    Car transfer:  Assistance Level: Supervision  Skilled Clinical Factors: Patient was able to complete the mock car transfer using stepping technique. She was able to [place] balance well on right LE as she stepped in, but she held to the door. PT reviewed safe technique and better options for hand placement. She verbalized understanding. She was able to stand from the car with good hand placement and Supervision.     Ambulation:  Surface: Level surface  Device: Rolling walker  Distance: 200' with multiple turns, short distances in therapy gym  Activity: Within Room  Activity Comments: slightly flexed posture, keith improved  Assistance Level: Supervision  Gait Deviations: Decreased step length bilateral, Decreased weight shift right  Skilled Clinical Factors: no LOB    Stairs:  Stair Height: 6''  Device: Bilateral handrails  Number of Stairs: 12  Additional Factors: Verbal cues, Non-reciprocal going up, Non-reciprocal going down  Assistance Level: Supervision  Skilled Clinical Factors: Patient able to complete full flight of stairs with bilateral rails with Supervision. PT discussed how patient will get the walker up and down the steps at home. patient reports she does not want to be able to take the walker up and down by herself. PT discussed options of having a second walker upstairs or the need of family to take the walker up and down the steps for the patient. PT encouraged patient to discuss with her daughter so they have it worked out before she returns home. Curb:  Curb Height: 6''  Device: Rolling walker  Number of Curbs: 1  Additional Factors: Verbal cues  Assistance Level: Supervision  Skilled Clinical Factors: Patient recalled proper technique and was able to complete with supervision. Assessment:  Assessment: Ms. Niru Garza continues to be limited by her pain, but gait pattern has greatly improved. She is now supervision for transfers and is ambulating 200' with wheeled walker with supervision. She was able to complete 12 stairs with two rails with supervision and she was able to completed curb step with wheeled walker with supervsion. She was able to recall safe technique on stairs. Patient is now modified independence with bed mobility. She continues to be below baseline and will benefit from continued skilled therapy for strengthening, gait training, and functional mobility training to allow for safe return robert with family. Activity Tolerance: Patient tolerated treatment well, Patient limited by pain  Discharge Recommendations: Continue to assess pending progress, Patient would benefit from continued therapy after discharge        SPEECH THERAPY (intentionally left blank if not actively being seen by this service):      OCCUPATIONAL THERAPY  ADLs:  Feeding  Assistance Level:  Independent  Skilled Clinical Factors: able to drink water but declined to eat her lunch d/t nausea; had Zofran  Grooming/Oral Hygiene  Assistance Level: Modified independent  Skilled Clinical Factors: stood at sink for oral care & washing hands  UE Bathing  Assistance Level: Set-up  Skilled Clinical Factors: while seated on shower chair, OT managed water on/off & adjusted temp; she was able to wash, rinse & dry UB w/ set up  LE Bathing  Equipment Provided: Long-handled sponge  Assistance Level: Contact guard assist  Skilled Clinical Factors: provided cues to remain seated for majority of LB bathing & using LH sponge to wash feet to reduce amount of bending over; stood w/ CGA to wash rinse & dry lars areas, cues to keep 1 hand on GB AATs to reduce fall risk, a little impulsive; did not dry off her calves or feet completely despite cues, fatigue noted  UE Dressing  Assistance Level: Set-up  Skilled Clinical Factors: seated to doff/don shirt  LE Dressing  Equipment Provided: Reachers  Assistance Level: Contact guard assist  Skilled Clinical Factors: able to doff clothing from L foot, shown how to use reacher off R foot; continues to bend over & attempt crossing R leg but increased pain/whincing noted--OT provided tactile cues to use reacher to assist; needs to dress R LE first d/t weakness, stood w/ close SB/CGA to manage clothing over hips using B hands  Putting On/Taking Off Footwear  Assistance Level: Moderate assistance  Skilled Clinical Factors: she is able to doff non skid socks, OT donned them and adilia hose d/t fatigue; will educate pt how to use SA in PM session  Toileting  Assistance Level: Stand by assist  Skilled Clinical Factors: mild impulsiveness, managed clothing using B hands, safety concerns while standing unsupported    Transfers: Toilet Transfers  Equipment: Grab bars  Additional Factors: Cues for hand placement  Assistance Level: Stand by assist  Skilled Clinical Factors: SBA on/off toilet, cues to step R leg forward to reduce amount of pain w/ descent  Tub/Shower Transfers  Type: Shower  Transfer From: Rolling walker  Transfer To: Shower chair with back  Additional Factors:  With handrails  Assistance Level: Contact guard assist  Skilled Clinical Factors: offered shower but declined,  had one yesterday; is feeling \"sick\" today    IADLs:  Meal Prep     Money Management     Light Housekeeping   MI to stand x 6 min while folding laundry  [address] Management       UE function:  WFLs    Assessment:  Assessment: pt making steady gains w/ OT intervention. She is reporting nausea  & 5/10 R hip pain (RN aware but pt not due x 3 more hrs--already had combination of oxycodone, Robaxin and Zofran, now asking for Ativan but is not due). OT wrote down the times & frequencies of her medications however she doesn't seem to understand or recall when they are due again; educated on the harmful effects of taking Ativan along w/ these other medications including dizziness & AMS . She had to use the toilet twice during session, able to get out of bed w/ SBA, cues for pausing from supine to sit to reduce dizziness. Pt used RW to/from bathrm w/ close SBA --tactile cues to keep RW w/ her thru entire transfer. Discussed the importance of using reacher to grab items off floor to reduce fall risk & bending over too far. Pt likely to be ready for DC early next wk/WED, will need family education scheduled prior to DC. Anticipate she may benefit from TTB, basket & reacher. Recommend Home OT services to ensure IND w/ IADLs and fxl mobility using RW in home environment  Activity Tolerance: Patient tolerated treatment well, Patient limited by pain  Discharge Recommendations: DC to home w/ family support & HH OT     NUTRITION  Most recent weightWeight: 154 lb 15.7 oz (70.3 kg)  BSA (Calculated - sq m): 1.86 sq meters  BMI (Calculated): 22.3  Please see nutrition note for details. NURSING  Continent of bowel and bladder, incision care, fall risk, monitor and maintain skin integrity.   Family Education: Patient:Medications, incision care, pain control as needed, safety and fall prevention, skin care and prevention. MEDICAL      TEAM SUMMARY AND DISCHARGE PLAN  Estimated Length of Stay:11/9/2022  Destination: home health  Anticipated Services at Discharge:    [x] OT  [x] PT   [] SLP    [x] RN   [] Home Health aide []   Community Resources: _______________________________  Equipment recommendations:  [] Hospital bed [x] Tub bench  [] Shower chair [] Hand held shower  [] Raised toilet seat [] Toilet safety frame [] Bedside commode   [] W/C: _____  [x] Valene Screen [] Standard walker [] Gait belt [] cane: _________  [] Sliding board [] Alternate seating/furniture [] O2 [] Hip Kit: _______  [] Life Line [x] Other: _reacher, basket______  Factors facilitating achievement of predicted outcomes: Family support  Barriers to the achievement of predicted outcomes/Interventions:     Poor pain tolerance, anxiety, limited appetite    Interdisciplinary Individualized Plan of Care Review:    Continue Current Plan of Care: Yes    Modifications:_____________________________    Special Needs in the Upcoming Week :    [x] Family/Caregiver Education  [] Home visit  []Therapeutic Pass   [] Consults:_______    [] Other;_______    Patient Rehab Team Goals for the Upcoming Week:  1. Functional mobility with modified independence with use of walker in room  2. Complete toilet tasks & toilet t/f w/ MI  3. Team Members Present at Conference:  Physician:Dr. Elie HEDRICK  : [facility]    Occupational Therapist: Medina Larry OTR/L  Physical Therapist:Mague Pastor DPT [id number]  Speech Therapist:   ISAURA Colby RN CRRN  Dietician:  Psychologist:  Other:      I led this team conference and I approve the established interdisciplinary plan of care [facility]     MD: Dr. Andrea Sanchez

## 2022-11-07 NOTE — PLAN OF CARE
Problem: Discharge Planning  Goal: Discharge to home or other facility with appropriate resources  Outcome: Progressing     Problem: Safety - Adult  Goal: Free from fall injury  11/6/2022 2237 by Mariluz Paige RN  Outcome: Progressing  11/6/2022 1052 by Madhuri Banuelos RN  Outcome: Progressing  Flowsheets (Taken 11/4/2022 1055 by Beata Mcbride RN)  Free From Fall Injury: Instruct family/caregiver on patient safety     Problem: ABCDS Injury Assessment  Goal: Absence of physical injury  11/6/2022 2237 by Mariluz Paige RN  Outcome: Progressing  11/6/2022 1052 by Madhuri Banuelos RN  Outcome: Progressing  Flowsheets (Taken 11/4/2022 1055 by Beata Mcbride RN)  Absence of Physical Injury: Implement safety measures based on patient assessment     Problem: Pain  Goal: Verbalizes/displays adequate comfort level or baseline comfort level  11/6/2022 2237 by Mariluz Paige RN  Outcome: Progressing  11/6/2022 1052 by Madhuri Banuelos RN  Outcome: Progressing  Flowsheets (Taken 11/6/2022 1052)  Verbalizes/displays adequate comfort level or baseline comfort level:   Encourage patient to monitor pain and request assistance   Assess pain using appropriate pain scale   Administer analgesics based on type and severity of pain and evaluate response   Implement non-pharmacological measures as appropriate and evaluate response     Problem: Neurosensory - Adult  Goal: Achieves stable or improved neurological status  Outcome: Progressing  Goal: Absence of seizures  Outcome: Progressing  Goal: Remains free of injury related to seizures activity  Outcome: Progressing  Goal: Achieves maximal functionality and self care  Outcome: Progressing     Problem: Respiratory - Adult  Goal: Achieves optimal ventilation and oxygenation  Outcome: Progressing     Problem: Cardiovascular - Adult  Goal: Maintains optimal cardiac output and hemodynamic stability  Outcome: Progressing  Goal: Absence of cardiac dysrhythmias or at baseline  Outcome: Progressing     Problem: Skin/Tissue Integrity - Adult  Goal: Skin integrity remains intact  Outcome: Progressing  Goal: Incisions, wounds, or drain sites healing without S/S of infection  Outcome: Progressing  Goal: Oral mucous membranes remain intact  Outcome: Progressing     Problem: Musculoskeletal - Adult  Goal: Return mobility to safest level of function  11/6/2022 2237 by Viola Gann RN  Outcome: Progressing  11/6/2022 1052 by Alpesh Fishman RN  Outcome: Progressing  Flowsheets (Taken 11/6/2022 0040 by Viola Gann RN)  Return Mobility to Safest Level of Function: Assess patient stability and activity tolerance for standing, transferring and ambulating with or without assistive devices  Goal: Maintain proper alignment of affected body part  Outcome: Progressing  Goal: Return ADL status to a safe level of function  Outcome: Progressing     Problem: Gastrointestinal - Adult  Goal: Minimal or absence of nausea and vomiting  Outcome: Progressing  Goal: Maintains or returns to baseline bowel function  Outcome: Progressing  Goal: Maintains adequate nutritional intake  Outcome: Progressing  Goal: Establish and maintain optimal ostomy function  Outcome: Progressing     Problem: Genitourinary - Adult  Goal: Absence of urinary retention  Outcome: Progressing  Goal: Urinary catheter remains patent  Outcome: Progressing     Problem: Infection - Adult  Goal: Absence of infection at discharge  Outcome: Progressing  Goal: Absence of infection during hospitalization  Outcome: Progressing  Goal: Absence of fever/infection during anticipated neutropenic period  Outcome: Progressing     Problem: Metabolic/Fluid and Electrolytes - Adult  Goal: Electrolytes maintained within normal limits  Outcome: Progressing  Goal: Hemodynamic stability and optimal renal function maintained  Outcome: Progressing  Goal: Glucose maintained within prescribed range  Outcome: Progressing     Problem: Hematologic - Adult  Goal: Maintains hematologic stability  Outcome: Progressing     Problem: Anxiety  Goal: Will report anxiety at manageable levels  Description: INTERVENTIONS:  1. Administer medication as ordered  2. Teach and rehearse alternative coping skills  3. Provide emotional support with 1:1 interaction with staff  11/6/2022 2237 by Dallas Mcfadden RN  Outcome: Progressing  11/6/2022 1052 by Liz Vázquez RN  Outcome: Progressing  Flowsheets (Taken 11/6/2022 1052)  Will report anxiety at manageable levels: Administer medication as ordered     Problem: Coping  Goal: Pt/Family able to verbalize concerns and demonstrate effective coping strategies  Description: INTERVENTIONS:  1. Assist patient/family to identify coping skills, available support systems and cultural and spiritual values  2. Provide emotional support, including active listening and acknowledgement of concerns of patient and caregivers  3. Reduce environmental stimuli, as able  4. Instruct patient/family in relaxation techniques, as appropriate  5. Assess for spiritual pain/suffering and initiate Spiritual Care, Psychosocial Clinical Specialist consults as needed  Outcome: Progressing     Problem: Death & Dying  Goal: Pt/Family communicate acceptance of impending death and feel psychological comfort and peace  Description: INTERVENTIONS:  1. Assess patient/family anxiety and grief process related to end of life issues  2. Provide emotional and spiritual support  3. Provide information about the patient's health status with consideration of family and cultural values  4. Communicate willingness to discuss death and facilitate grief process  with patient/family as appropriate  5. Emphasize sustaining relationships within family system and community, or martin/spiritual traditions  6.  Initiate Spiritual Care, Psychosocial Clinical Specialist, consult as needed  Outcome: Progressing     Problem: Change in Body Image  Goal: Pt/Family communicate acceptance of loss or change in body image and feel psychological comfort and peace  Description: INTERVENTIONS:  1. Assess patient/family anxiety and grief process related to change in body image, loss of functional status, loss of sense of self, and forgiveness  2. Provide emotional and spiritual support  3. Provide information about the patient's health status with consideration of family and cultural values  4. Communicate willingness to discuss loss and facilitate grief process with patient/family as appropriate  5. Emphasize sustaining relationships within family system and community, or martin/spiritual traditions  6. Initiate Spiritual Care, Psychosocial Clinical Specialist consult as needed  Outcome: Progressing     Problem: Decision Making  Goal: Pt/Family able to effectively weigh alternatives and participate in decision making related to treatment and care  Description: INTERVENTIONS:  1. Determine when there are differences between patient's view, family's view, and healthcare provider's view of condition  2. Facilitate patient and family articulation of goals for care  3. Help patient and family identify pros/cons of alternative solutions  4. Provide information as requested by patient/family  5. Respect patient/family right to receive or not to receive information  6. Serve as a liaison between patient and family and health care team  7. Initiate Consults from Ethics, Palliative Care or initiate [address] [place] [address] as is appropriate  Outcome: Progressing     Problem: Confusion  Goal: Confusion, delirium, dementia, or psychosis is improved or at baseline  Description: INTERVENTIONS:  1. Assess for possible contributors to thought disturbance, including medications, impaired vision or hearing, underlying metabolic abnormalities, dehydration, psychiatric diagnoses, and notify attending LIP  2. [place] high risk fall precautions, as indicated  3.  Provide frequent short contacts to provide reality reorientation, refocusing and direction  4. Decrease environmental stimuli, including noise as appropriate  5. Monitor and intervene to maintain adequate nutrition, hydration, elimination, sleep and activity  6. If unable to ensure safety without constant attention obtain sitter and review sitter guidelines with assigned personnel  7. Initiate Psychosocial CNS and Spiritual Care consult, as indicated  Outcome: Progressing     Problem: Behavior  Goal: Pt/Family maintain appropriate behavior and adhere to behavioral management agreement, if implemented  Description: INTERVENTIONS:  1. Assess patient/family's coping skills and  non-compliant behavior (including use of illegal substances)  2. Notify security of behavior or suspected illegal substances which indicate the need for search of the family and/or belongings  3. Encourage verbalization of thoughts and concerns in a socially appropriate manner  4. Utilize positive, consistent limit setting strategies supporting safety of patient, staff and others  5. Encourage participation in the decision making process about the behavioral management agreement  6. If a visitor's behavior poses a threat to safety call refer to organization policy. 7. Initiate consult with , Psychosocial CNS, Spiritual Care as appropriate  Outcome: Progressing     Problem: Depression/Self Harm  Goal: Effect of psychiatric condition will be minimized and patient will be protected from self harm  Description: INTERVENTIONS:  1. Assess impact of patient's symptoms on level of functioning, self care needs and offer support as indicated  2. Assess patient/family knowledge of depression, impact on illness and need for teaching  3. Provide emotional support, presence and reassurance  4. Assess for possible suicidal thoughts or ideation. If patient expresses suicidal thoughts or statements do not leave alone, initiate Suicide Precautions, move to a room close to the nursing station and obtain sitter  5.  Initiate consults as appropriate with Mental Health Professional, Spiritual Care, Psychosocial CNS, and consider a recommendation to the LIP for a Psychiatric Consultation  Outcome: Progressing     Problem: Abuse/Neglect  Goal: Pt/Caregiver aware of resources to assist with issues of abuse and neglect  Description: INTERVENTIONS:  1. Assess for level of risk and safety  2. Initiate referral to Social Work and notify Licensed Independent Practictioner ([facility])  3. Provide appropriate education and resources to patient and/or family  4. Initiate referral to Adult MONSE Zamora, as appropriate  5. Initiate referral to CARLO Romero, as appropriate  6. Offer to have the patient's the patient's chart marked as Non-disclosed/Privacy patient for phone inquiries, as appropriate  7. Provide emotional support, including active listening and acknowledgment of concerns  Outcome: Progressing     Problem: Drug Abuse/Detox  Goal: Will have no detox symptoms and will verbalize plan for changing drug-related behavior  Description: INTERVENTIONS:  1. Administer medication as ordered  2. Monitor physical status  3. Provide emotional support with 1:1 interaction with staff  4. Encourage  recovery focused treatment   Outcome: Progressing     Problem: Involuntary Admit  Goal: Will cooperate with staff recommendations and doctor's orders and will demonstrate appropriate behavior  Description: INTERVENTIONS:  1. Treat underlying conditions and offer medication as ordered  2. Educate regarding involuntary admission procedures and rules  3.  Contain excessive/inappropriate behavior per unit and hospital policies  Outcome: Progressing

## 2022-11-07 NOTE — PROGRESS NOTES
Topher Villela  11/7/2022  [id number]    Chief Complaint: Closed right hip fracture, sequela    Subjective: Patient seen this AM.  She is standing at the sink brushing her teeth, with good pain control of her right hip. Patient did well over the weekend, with no problems noted. We will recheck her labs tomorrow morning. Patient working in therapies to improve her transfers and gait. Patient allowed to be weightbearing as tolerated. Her pain is under control with her current medication. Patient will be discussed in rehab conference tomorrow with the entire rehab team.      ROS: No N/V, SOB, chest pain, chills or fever. Objective:  Patient Vitals for the past 24 hrs:   BP Temp Temp src Pulse Resp SpO2 Height Weight   11/07/22 0529 (!) 147/84 98.5 °F (36.9 °C) Oral 85 17 95 % 5' 10\" (1.778 m) 154 lb 15.7 oz (70.3 kg)   11/07/22 0211 -- -- -- -- 16 -- -- --   11/07/22 0141 -- -- -- -- 16 -- -- --   11/06/22 2103 -- -- -- -- 17 -- -- --   11/06/22 2033 -- -- -- -- 16 -- -- --   11/06/22 1706 -- -- -- -- 16 -- -- --   11/06/22 1636 -- -- -- -- 16 -- -- --   11/06/22 1512 (!) 159/82 98.1 °F (36.7 °C) Oral 86 18 94 % -- --   11/06/22 1219 -- -- -- -- 16 -- -- --   11/06/22 1149 -- -- -- -- 16 -- -- --   11/06/22 1015 (!) 155/80 -- -- 61 16 -- -- --       Gen: No distress, pleasant. HEENT: Normocephalic, atraumatic. CV: Regular rate and rhythm. Resp: No respiratory distress. Abd: Soft, nontender   Ext: No edema. Pain with right hip movement. Neuro: Alert, oriented, appropriately interactive.        Wt Readings from Last 3 Encounters:   11/07/22 154 lb 15.7 oz (70.3 kg)   10/31/22 138 lb 3.7 oz (62.7 kg)   11/09/20 117 lb 8.1 oz (53.3 kg)       Laboratory data:   Lab Results   Component Value Date    WBC 10.3 11/03/2022    HGB 10.3 (L) 11/03/2022    HCT 30.5 (L) 11/03/2022    MCV 96.9 11/03/2022     11/03/2022       Lab Results   Component Value Date/Time     11/03/2022 07:13 AM    K 4.0 11/03/2022 07:13 AM     11/03/2022 07:13 AM    CO2 29 11/03/2022 07:13 AM    BUN 19 11/03/2022 07:13 AM    CREATININE <0.5 11/03/2022 07:13 AM    GLUCOSE 100 11/03/2022 07:13 AM    CALCIUM 8.2 11/03/2022 07:13 AM        Therapy progress:  PT  Assessment: Pt is limited this morning due to nausea, she was given anti-nausea medications prior to therapy but she reports these are not helping. Mobility is very limited due to this, pt did get OOB to use the bathroom, but that was the only functional mobility she was willing to perform at this time. Pt continues to be below baseline and will benefit from continued skilled therapy to address impaired strength, and functional mobility to allow a safe return to home with family. OT  Assessment: pt making steady gains w/ OT intervention. She is reporting nausea  & 5/10 R hip pain (RN aware but pt not due x 3 more hrs--already had combination of oxycodone, Robaxin and Zofran, now asking for Ativan but is not due). Pt used RW to/from bathrm w/ close SB/CGA --tactile cues to keep RW w/ her thru entire transfer. She is able to stand at sink to wash hands & perform oral care IND'ly. She tolerated UE HEP using 2 lb wts for 1 set of 15 for each of the 5 exercises      Body mass index is 22.24 kg/m². Assessment and Plan:     Angulated right neck femoral fracture-  S/P RIGHT total hip arthroplasty, 11/1, Dr. Warden Antunez, oxycodone    History of lumbar spine surgery, chronic low back pain-  lorazepam, Tylenol     HLD- Lipitor, ASA     Bowels: Schedule Miralax + Senna S. Follow bowel movements. Enema or suppository if needed. Bladder: Check PVR x 3. [address] if PVR > 200ml or if any volume is > 500 ml. Pain: Oxycodone is ordered prn.          Luis Alston MD 11/7/2022, 7:42 AM

## 2022-11-07 NOTE — PROGRESS NOTES
Occupational Therapy  OCCUPATIONAL THERAPY  Progress Note   Second Session    Patient Name: [facility] Record Number: [id number]         General  Chart Reviewed: Yes  Patient assessed for rehabilitation services?: Yes  Additional Pertinent Hx: 73 yo female admitted for a fall resulting in RIGHT hip femoral neck fracture with displacement. s/p 11/1 R MACI now WBAT. Per Dr. Rocael Krishnan no precautions. PMH: Chronic back pain, Restless leg syndrome  Family / Caregiver Present: No  Referring Practitioner: Dr Na Olson, Dr Rocael Krishnan ortho  Diagnosis: Fall, R femoral neck fx, THR     Restrictions/Precautions  Restrictions/Precautions: Weight Bearing, Fall Risk  Lower Extremity Weight Bearing Restrictions  Right Lower Extremity Weight Bearing: Weight Bearing As Tolerated     Position Activity Restriction  Other position/activity restrictions: WBAT no precautions per Dr Rocael Krishnan, reg diet         Objective: met in therapy dept, she reports fatigue & 7/10 R hip<>thigh pain    Assessment: Pt completed 2 sets of 20 using 3 lb gripper to improve  on handles of RW and GB during ADLs & t/f. She tolerated UE HEP for strengthening using 2 lb wts, completed 1 set of 15 for bicep curls, chest presses, overhead presses, sup/pronation, ER/IR. She was IND w/ standing x 5 minutes while folding laundry, no LOB noted. She was shown TTB & practiced it--completed w/ SB cues to lift each leg over ledge; shown how to tuck shower liner into slit on TTB. She was MI to ambulate to/from bathrm & completed toilet t/f; able to manage clothing and ambulated to sink to [facility] hands w/ MI. Completed sit to supine w/ MI & able to get B LEs into bed & scoot to middle. Given ice pack for R hip pain relief. Pt functioning below occupational performance baseline limited by R hip pain and decreased endurance and safety awareness.  She would benefit from home OT services to improve her IND w/ ADLs, t/f & safety w/ fxl mobility  w/ family support    Safety Device - Type of devices:  []  All fall risk precautions in place [x] Bed alarm in place  [x] Call light within reach [] Chair alarm in place [] Positioning belt [x] Gait belt [] Patient at risk for falls [x] Left in bed [] Left in chair [] Telesitter in use [] Sitter present [] Nurse notified []  None      Therapy Time   Individual Co-treatment   Time In 8564     Time Out 1600     Minutes 45       Electronically signed by TRICIA Guerrero/L #[id number] on 11/7/2022 at 3:18 PM

## 2022-11-07 NOTE — PROGRESS NOTES
Physical Therapy  Facility/Department: [facility]  REHAB  Rehabilitation Physical Therapy Treatment Note    NAME: Vanessa Murguia  : 1950 (71 y.o.)  MRN: [id number]  CODE STATUS: Full Code    Date of Service: 22       Restrictions:  Restrictions/Precautions: Weight Bearing; Fall Risk  Lower Extremity Weight Bearing Restrictions  Right Lower Extremity Weight Bearing: Weight Bearing As Tolerated  Position Activity Restriction  Other position/activity restrictions: WBAT no precautions per Dr Zuniga Needs, reg diet     Pertinent medical information:  Additional Pertinent Hx: here due to fall in bathroom at home - found to have right hip fracture, R MACI    SUBJECTIVE  Subjective  Subjective: patient reports she is doing well but she continues to have great difficulties sleeping. Pain: Patient rating pain at a 5/10 right mid thigh. Social/Functional History  Lives With: Daughter (son in law and grand children.  family works during the day)  Type of Home: [facility]: Two level, Bed/Bath upstairs (laundry room on 2nd fl; has 1/2 bath on 1st fl)  Home Access: Stairs to enter without rails  Entrance Stairs - Number of Steps: 1+1 in front, 2 RENITA in back, pt usually goes in from the front (7+6 steps w/ 1 HR to upstairs)  Bathroom Shower/Tub: Tub/Shower unit  Bathroom Toilet: Standard  Bathroom Accessibility: Not accessible (doesn't think RW will fit)  Has the patient had two or more falls in the past year or any fall with injury in the past year?: Yes (had fallen down a flight of steps after back surgery)  ADL Assistance: Independent  Homemaking Assistance: Independent (shares)  Ambulation Assistance: Independent  Transfer Assistance: Independent  Active : Yes  Mode of Transportation: Car  Occupation: Retired  Type of Occupation:   Leisure & Hobbies: takes care 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 with autism, watches TV, is a homebody  IADL Comments: pt does cook for herself  Additional Comments: pt alone during the day until her autistic grandson arrives from school        OBJECTIVE  Orientation  Overall Orientation Status: Within Functional Limits    Functional Mobility  Bed Mobility  Overall Assistance Level: Modified Independent  Additional Factors: Head of bed flat; Without handrails  Sit to Supine  Assistance Level: Modified independent  Supine to Sit  Assistance Level: Modified independent  Scooting  Assistance Level: Modified independent    Balance  Sitting Balance: Modified independent   Standing Balance: Supervision (with bilateral UE support on walker)    Transfers  Surface: To bed;From bed; To chair with arms;From chair with arms  Device: Walker  Sit to Stand  Assistance Level: Supervision  Stand to Sit  Assistance Level: Supervision  Bed To/From Chair  Technique: Stand step  Assistance Level: Supervision  Skilled Clinical Factors: with wheeled walker    Car Transfer  Assistance Level: Supervision  Skilled Clinical Factors: Patient was able to complete the mock car transfer using stepping technique. She was able to maintain balance well on right LE as she stepped in, but she held to the door. PT reviewed safe technique and better options for hand placement. She verbalized understanding. She was able to stand from the car with good hand placement and Supervision. Environmental Mobility  Ambulation  Surface: Level surface  Device: Rolling walker  Distance: 200' with multiple turns, short distances in therapy gym  Activity: Within Room  Activity Comments: slightly flexed posture, keith improved  Assistance Level: Supervision  Skilled Clinical Factors: no LOB    Stairs  Stair Height: 6''  Device: Bilateral handrails  Number of Stairs: 12  Additional Factors: Verbal cues; Non-reciprocal going up;Non-reciprocal going down  Assistance Level: Supervision  Skilled Clinical Factors: Patient able to complete full flight of stairs with bilateral rails with Supervision.  PT discussed how patient will get the walker up and down the steps at home. patient reports she does not want to be able to take the walker up and down by herself. PT discussed options of having a second walker upstairs or the need of family to take the walker up and down the steps for the patient. PT encouraged patient to discuss with her daughter so they have it worked out before she returns home. Curb  Curb Height: 6''  Device: Rolling walker  Number of Curbs: 1  Assistance Level: Supervision  Skilled Clinical Factors: Patient recalled proper technique and was able to complete with supervision. PT Exercises  Exercise Treatment: Patient performed 20 reps bilateral LE ther ex while supine on therapy mat: APs, heel slides, QS, SAQ with blue bolster under knees      ASSESSMENT/PROGRESS TOWARDS GOALS       Assessment  Assessment: Ms. Maciej Moreno continues to be limited by her pain, but gait pattern has greatly improved. She is now supervision for transfers and is ambulating 200' with wheeled walker with supervision. She was able to complete 12 stairs with two rails with supervision and she was able to completed curb step with wheeled walker with supervision. She was able to recall safe technique on stairs. Patient is now modified independence with bed mobility. She continues to be below baseline and will benefit from continued skilled therapy for strengthening, gait training, and functional mobility training to allow for safe return home with family. Activity Tolerance: Patient tolerated treatment well;Patient limited by pain  Discharge Recommendations: Continue to assess pending progress; Patient would benefit from continued therapy after discharge  PT Equipment Recommendations  Equipment Needed: Yes  Mobility Devices: [place]: Rolling    PM Session  PT started bedside and patient was supine in bed. She reports she has been more sore this afternoon and RN just gave pain medication before therapy.   She rates her pain at a 6/10 at rest. Supine>sit with HOB elevated with supervision. Sit<>stand with supervision. Ambulated to therapy gym with wheeled walker with supervision. Patient performed 15-20 reps bilateral LE ther ex while seated in wheelchair: heel/toe raises, LAQ with PT supporting right thigh, GS, hip add sets, hip abduction with medium resistance band, hamstring curls with medium resistance band, and marching. Seated rest break following. Sit<>stand with supervision. Ambulated 26' with wheeled walker with supervision. She was abl to transition on and off carpet without difficulty, but reports pain increasing with increased distance and required seated rest break. Patient ambulated back to room with wheeled walker with supervision. She requested use of bathroom and was supervision for toilet transfer and modified independent for toileting. She was able to ambulate to sink and stand to wash hands with supervision. Patient them ambulated back to bed with wheeled walker with supervision. Patient reporting pain was increased. PT retrieved ice pack and placed on right thigh. She states she feels the medication is not helping her. PT will leave a message for Dr. Lupillo Koo. Patient requesting her ativan at end of session and RN, Crow Purvis, was notified.     Safety Device - Type of devices:  [x]  All fall risk precautions in place [x] Bed alarm in place  [x] Call light within reach [] Chair alarm in place [] Positioning belt [x] Gait belt [] Patient at risk for falls [x] Left in bed [] Left in chair [] Telesitter in use [] Sitter present [x] Nurse notified []  None      Goals  Patient Goals   Patient Goals : pain relief and get home  Short Term Goals  Time Frame for Short Term Goals: 7-10 days  Short Term Goal 1: bed mobility modif I  Short Term Goal 2: all transfers modif I  Short Term Goal 3: amb >80' modif I with wh walker  Short Term Goal 4: 12 steps R rail modif I  Short Term Goal 5: HEP I  Long Term Goals  Time Frame for Long Term Goals : STG=LTG    PLAN OF CARE/SAFETY  Physcial Therapy Plan  General Plan:  minutes of therapy at least 5 out of 7 days a week  Current Treatment Recommendations: Strengthening;ROM; Functional mobility training;Transfer training;ADL/Self-care training;Gait training;Positioning;Modalities; Therapeutic activities; Patient/Caregiver education & training  Safety Devices  Type of Devices: All fall risk precautions in place;Gait belt;Left in chair (in wheelchair to return to room with transportation)    EDUCATION  Education  Education Given To: Patient  Education Provided: Plan of Care;Home Exercise Program;Mobility Training;Transfer Training; Safety  Education Method: Verbal  Barriers to Learning: None  Education Outcome: Verbalized understanding;Demonstrated understanding;Continued education needed        Therapy Time   Individual Concurrent Group Co-treatment   Time In 0945         Time Out 5163         [facility] [place]   Time In 1300     Time Out 1345     Minutes [address] [phone], 11/07/22 at 10:25 AM

## 2022-11-07 NOTE — PLAN OF CARE
Problem: Discharge Planning  Goal: Discharge to home or other facility with appropriate resources  11/7/2022 1140 by Beena Goss RN  Outcome: Progressing     Problem: Safety - Adult  Goal: Free from fall injury  11/7/2022 1140 by Beena Goss RN  Outcome: Progressing     Problem: Pain  Goal: Verbalizes/displays adequate comfort level or baseline comfort level  11/7/2022 1140 by Beena Goss RN  Outcome: Progressing     Problem: Gastrointestinal - Adult  Goal: Minimal or absence of nausea and vomiting  11/7/2022 1140 by Beena Goss RN  Outcome: Adequate for Discharge     Problem: Genitourinary - Adult  Goal: Absence of urinary retention  11/7/2022 1140 by Beena Goss RN  Outcome: Adequate for Discharge

## 2022-11-07 NOTE — FLOWSHEET NOTE
Patient admitted to ARU on 11/2/22 with Dx of Right total hip arthroplasty    Patient is A/O and 4. Transfers with walker x 1 asst with gaitbelt. Calls appropriately when needing to go to BR at night but doesn't call when she is ready to get up from the commode. Patient has started refusing her scheduled Tylenol. Only wants her prn ativan and prn oxycodone 5mg to control her anxiety and pain from her post surgical site. Also been complaining that she couldn't sleep well in the evenings after dinner as \"It's so loud out there in the hallway just before visiting hour is over. \"      Bed alarm on. Call light within reach.

## 2022-11-07 NOTE — DISCHARGE SUMMARY
Hospital Medicine Discharge Summary      Patient ID: Vanessa Murguia , [id number]     Patient's PCP: Sangita Osuna MD    Admit Date: 10/31/2022     Discharge Date: 11/2/2022      Admitting Physician: Rickey Chery MD    Discharge Physician: Ángela Romero MD     Discharge Diagnoses: Active Hospital Problems    Diagnosis Date Noted    Closed fracture of right hip [facility]) [S72.001A] 10/31/2022     Priority: Medium    Closed right hip fracture, initial encounter [facility]) [S72.001A] 10/31/2022     Priority: Medium         The patient was seen and examined on the day of discharge and this discharge summary is in conjunction with any daily progress note from day of discharge. HOSPITAL COURSE  Patient demographics:  The patient  Vanessa Murguia is a 74 y.o. female      Significant past medical history:       Patient Active Problem List   Diagnosis    Closed fracture of right hip ([address]    Closed right hip fracture, initial encounter [facility])            Presenting symptoms:  Fall     Diagnostic workup:   CT HEAD WO CONTRAST  XR HIP 2-3 VW W PELVIS RIGHT  XR HIP RIGHT       CONSULTS DURING ADMISSION :   IP CONSULT TO HOSPITALIST  IP CONSULT TO PHYSICAL MEDICINE REHAB        Patient was diagnosed with:  Angulated right neck femoral fracture  Back pain     Treatment while inpatient:  74years old female with medical history significant for chronic back pain multiple lower back surgeries who presented to the emergency room after a fall. Patient was diagnosed with right hip fracture. Orthopedic surgery was consulted and patient underwent ORIF on 11/1. Patient is being discharged to acute rehab unit. Discharge Condition:  stable:    Discharged to:  skilled nursing  facility    Activity:   as tolerated: Follow Up:  Patient will follow up with her primary care physician once discharged from the rehabilitation unit     Labs:  For convenience and continuity at follow-up the following most recent labs are provided:      CBC:   Lab Results   Component Value Date/Time    WBC 10.3 11/03/2022 07:13 AM    HGB 10.3 11/03/2022 07:13 AM    HCT 30.5 11/03/2022 07:13 AM     11/03/2022 07:13 AM       RENAL:   Lab Results   Component Value Date/Time     11/03/2022 07:13 AM    K 4.0 11/03/2022 07:13 AM     11/03/2022 07:13 AM    CO2 29 11/03/2022 07:13 AM    BUN 19 11/03/2022 07:13 AM    CREATININE <0.5 11/03/2022 07:13 AM           Discharge Medications:      Medication List        START taking these medications      oxyCODONE 5 MG immediate release tablet  Commonly known as: ROXICODONE  Take 1 tablet by mouth every 4 hours as needed for Pain for up to 5 days. CHANGE how you take these medications      aspirin 81 MG chewable tablet  Take 1 tablet by mouth in the morning and 1 tablet in the evening. Take twice a day for 30 days after hip surgery for DVT blood clot prophylaxis. Then can resume daily dosing. What changed:   when to take this  additional instructions            CONTINUE taking these medications      LORazepam 2 MG tablet  Commonly known as: ATIVAN     methocarbamol 500 MG tablet  Commonly known as: ROBAXIN     simvastatin 40 MG tablet  Commonly known as: ZOCOR               Where to Get Your Medications        You can get these medications from any pharmacy    Bring a paper prescription for each of these medications  aspirin 81 MG chewable tablet  oxyCODONE 5 MG immediate release tablet            Time Spent on discharge is more than 30 min in the examination, evaluation, counseling and review of medications and discharge plan. Signed:  Pasquale Walker MD   11/6/2022      Thank you Petros Yu MD for the opportunity to be involved in this patient's care. If you have any questions or concerns please feel free to contact me at [phone]. This note was transcribed using [address].  Please disregard any translational errors.

## 2022-11-07 NOTE — PROGRESS NOTES
Occupational Therapy  Facility/Department: [facility]  Rehabilitation Occupational Therapy Daily Treatment Note    Date: 22  Patient Name: Michelle Bains       Room: A1J-7060/0784-20  MRN: [id number]  Account: [de-identified]   : 1950  (69 y.o.) Gender: female       Past Medical History:  has a past medical history of Arthritis, Chronic back pain, Hyperlipidemia, and Restless leg syndrome. Past Surgical History:   has a past surgical history that includes back surgery; Tubal ligation; and Total hip arthroplasty (Right, 2022). Restrictions  Restrictions/Precautions: Weight Bearing; Fall Risk  Other position/activity restrictions: WBAT no precautions per Dr Kenisha Mack, reg diet  Right Lower Extremity Weight Bearing: Weight Bearing As Tolerated    Subjective  Subjective: Pt met in dept. Pt agreeable to therapy. Pt stating pain is 5/10, RLE. Restrictions/Precautions: Weight Bearing; Fall Risk       Objective     Cognition  Overall Cognitive Status: Exceptions  Attention Span: Attends with cues to redirect  Safety Judgement: Decreased awareness of need for safety;Decreased awareness of need for assistance  Problem Solving: Assistance required to generate solutions  Insights: Decreased awareness of deficits  Cognition Comment: pt w/ flat affect, is impulsive & restless. Can be forgetful. Pt states \"If I could, I would put shoes on and run right down the perez, out of here\". Orientation  Overall Orientation Status: Within Functional Limits         ADL  Grooming/Oral Hygiene  Assistance Level: Modified independent  Skilled Clinical Factors: stood at sink for oral care & washing hands  Putting On/Taking Off Footwear  Equipment Provided: Sock aid;Reachers  Skilled Clinical Factors: Reviewed use of AE to doff footies (SBA w/reacher ) and don with SA with SBA, cues for technique.   Toileting  Assistance Level: Stand by assist  Skilled Clinical Factors: mild impulsiveness, managed clothing using B hands, safety concerns while standing unsupported  Toilet Transfers  Equipment: Grab bars  Additional Factors: Cues for hand placement  Assistance Level: Stand by assist  Skilled Clinical Factors: Pt requesting to use commode when back to room at end of tx. Pt used RW, cued for safe hand placement. Functional Mobility  Device: Rolling walker  Activity: Retrieve items;Transport items  Assistance Level: Contact guard assist;Stand by assist  Skilled Clinical Factors: close SB/CGA to use RW in kitchen area. Pt tolerating on feet x6.5 min. Pt used walker basket to carry items to/from refrig. Pt cued for safe hand and body placement w/reaching for ojbects. Pt reports she uses microwave \"above the stove\" at daughter's home, to \"heat up leftovers\", (stating she doesn't eat soup or oatmeal, declining to attempt using microwave to make these). Simulated cabinet above the sink as microwave, for pt to place and remove a plate, Supervision/SBA for static standing balance. Sit to Supine  Assistance Level: Stand by assist  Skilled Clinical Factors: Requests return to hospital bed in her room at end of tx. Transfers  Surface:  (chiars in kitchen area and recliner)  Additional Factors: Hand placement cues  Device: Walker (RW)  Sit to Stand  Assistance Level: Stand by assist  Stand to Sit  Assistance Level: Stand by assist   OT Exercises  Resistive Exercises: Orange theraband ex's issued w/handout and completed 10 reps for 6 ex's. Assessment  Assessment  Assessment: Pt tolerated session fairly well and is making gradual progress. Pt demonstrating decreased safety insight and decreased STM however, with transfers, as is impulsive, moving quickly and needing cues for safe hand, body placement w/RW. Pt SBA for transfers, mob. Pt needing close SB/light CGA for functional mob, including for kitchen mob, with use of walker basket. Pt tolerating up 6.5 min before c/o fatigue.  Pt SBA for toileting task and Mod I for stance at sink to wash hands. Pt tolerated theraband ex's to increase strength, endurance for functional tasks. Cont poc and recommend family ed session prior to d/c. Activity Tolerance: Patient tolerated treatment well;Patient limited by pain; Patient limited by fatigue  Discharge Recommendations: 5-7 sessions per week;Patient would benefit from continued therapy after discharge;Continue to assess pending progress  OT Equipment Recommendations  Other: TBD--does not own any DME--may need RW, TSF, reacher  Safety Devices  Safety Devices in place: Yes  Type of devices: Gait belt;Call light within reach; Bed alarm in place; Left in bed    Patient Education  Education  Education Given To: Patient  Education Provided: Home Exercise Program;Transfer Training; Safety;ADL Function  Education Provided Comments: educated on UE HEP for strengthening, reviewed safe hand placement during t/f & use of RW. AE for ADL's  Education Method: Demonstration;Verbal;Teach Back  Barriers to Learning: Cognition (impulsive; impaired STM)  Education Outcome: Verbalized understanding;Continued education needed    Plan  Occupational Therapy Plan  Times Per Week: 5-6  Times Per Day: Twice a day  Hours Per Day: 1.5 hours  Therapy Duration: 1 Week  Specific Instructions for Next Treatment: instruct & loan A/E, kitchen tasks w/ basket  Current Treatment Recommendations: Strengthening;ROM;Balance training;Functional mobility training; Endurance training;Pain management; Safety education & training;Self-Care / ADL; Home management training;Modalities; Positioning;Patient/Caregiver education & training;Equipment evaluation, education, & procurement    Goals  Patient Goals   Patient goals : \"get rid of this pain so I can sleep, walk & function properly\"  Short Term Goals  Time Frame for Short Term Goals: by 5 days pt will complete  Short Term Goal 1: toilet tasks w/ MI  Short Term Goal 2: household transfers w/ MI using RW  Short Term Goal 3: shower level bathing w/ set up using shower chair + GB  Short Term Goal 4: tolerate standing x 8-10 minutes during kitchen/IADL tasks w/ MI  Short Term Goal 5: UB & LB ADLs w/ MI using A/E prn  Long Term Goals  Time Frame for Long Term Goals : by 5 days pt will complete  Long Term Goal 1: complete caregiver education & address DME needs prior to DC  Long Term Goal 2: provide UE HEP for strengthening to improve IND w/ sit<>stand & overall strength to 4+/5      Therapy Time   Individual Concurrent Group Co-treatment   Time In 1115         Time Out 1200         Minutes 45                 Janie SORIANO/DAREN,[phone]

## 2022-11-07 NOTE — PROGRESS NOTES
Shift assessment completed and scheduled medications administered. Patient A&Ox4, VSS. Surgical incision clean, dry, and intact. Neuro checks unchanged. Pain managed with oral medications. Patient denies nausea, tolerating diet well. Patient ambulating with walker x1 assist, tolerates well. Pt instructed to call out for needs. Fall precautions in place.

## 2022-11-08 LAB
ANION GAP SERPL CALCULATED.3IONS-SCNC: 8 MMOL/L (ref 3–16)
BUN BLDV-MCNC: 12 MG/DL (ref 7–20)
CALCIUM SERPL-MCNC: 8.6 MG/DL (ref 8.3–10.6)
CHLORIDE BLD-SCNC: 103 MMOL/L (ref 99–110)
CO2: 28 MMOL/L (ref 21–32)
CREAT SERPL-MCNC: <0.5 MG/DL (ref 0.6–1.2)
GFR SERPL CREATININE-BSD FRML MDRD: >60 ML/MIN/{1.73_M2}
GLUCOSE BLD-MCNC: 84 MG/DL (ref 70–99)
HCT VFR BLD CALC: 33.4 % (ref 36–48)
HCT VFR BLD CALC: 38.9 % (ref 36–48)
HEMOGLOBIN: 11.5 G/DL (ref 12–16)
HEMOGLOBIN: 12.7 G/DL (ref 12–16)
MCH RBC QN AUTO: 31.5 PG (ref 26–34)
MCH RBC QN AUTO: 32.3 PG (ref 26–34)
MCHC RBC AUTO-ENTMCNC: 32.5 G/DL (ref 31–36)
MCHC RBC AUTO-ENTMCNC: 34.4 G/DL (ref 31–36)
MCV RBC AUTO: 93.9 FL (ref 80–100)
MCV RBC AUTO: 96.9 FL (ref 80–100)
PDW BLD-RTO: 13.8 % (ref 12.4–15.4)
PDW BLD-RTO: 13.9 % (ref 12.4–15.4)
PLATELET # BLD: 218 K/UL (ref 135–450)
PLATELET # BLD: 254 K/UL (ref 135–450)
PMV BLD AUTO: 8.4 FL (ref 5–10.5)
PMV BLD AUTO: 8.7 FL (ref 5–10.5)
POTASSIUM SERPL-SCNC: 3.5 MMOL/L (ref 3.5–5.1)
RBC # BLD: 3.56 M/UL (ref 4–5.2)
RBC # BLD: 4.02 M/UL (ref 4–5.2)
SODIUM BLD-SCNC: 139 MMOL/L (ref 136–145)
WBC # BLD: 11.2 K/UL (ref 4–11)
WBC # BLD: 6.5 K/UL (ref 4–11)

## 2022-11-08 PROCEDURE — 94760 N-INVAS EAR/PLS OXIMETRY 1: CPT

## 2022-11-08 PROCEDURE — 80048 BASIC METABOLIC PNL TOTAL CA: CPT

## 2022-11-08 PROCEDURE — 85027 COMPLETE CBC AUTOMATED: CPT

## 2022-11-08 PROCEDURE — 97530 THERAPEUTIC ACTIVITIES: CPT

## 2022-11-08 PROCEDURE — 6370000000 HC RX 637 (ALT 250 FOR IP): Performed by: ORTHOPAEDIC SURGERY

## 2022-11-08 PROCEDURE — 97535 SELF CARE MNGMENT TRAINING: CPT

## 2022-11-08 PROCEDURE — 97116 GAIT TRAINING THERAPY: CPT

## 2022-11-08 PROCEDURE — 6370000000 HC RX 637 (ALT 250 FOR IP): Performed by: PHYSICAL MEDICINE & REHABILITATION

## 2022-11-08 PROCEDURE — 97110 THERAPEUTIC EXERCISES: CPT

## 2022-11-08 PROCEDURE — 1280000000 HC REHAB R&B

## 2022-11-08 PROCEDURE — 36415 COLL VENOUS BLD VENIPUNCTURE: CPT

## 2022-11-08 RX ORDER — ASPIRIN 81 MG/1
81 TABLET, CHEWABLE ORAL 2 TIMES DAILY
Qty: 60 TABLET | Refills: 0 | Status: SHIPPED | OUTPATIENT
Start: 2022-11-08

## 2022-11-08 RX ORDER — METHOCARBAMOL 500 MG/1
500 TABLET, FILM COATED ORAL 4 TIMES DAILY
Qty: 40 TABLET | Refills: 0 | Status: SHIPPED | OUTPATIENT
Start: 2022-11-08 | End: 2022-11-18

## 2022-11-08 RX ORDER — OXYCODONE HYDROCHLORIDE 5 MG/1
5 TABLET ORAL EVERY 6 HOURS PRN
Qty: 50 TABLET | Refills: 0 | Status: SHIPPED | OUTPATIENT
Start: 2022-11-08 | End: 2022-11-22

## 2022-11-08 RX ADMIN — METHOCARBAMOL 500 MG: 500 TABLET ORAL at 19:54

## 2022-11-08 RX ADMIN — METHOCARBAMOL 500 MG: 500 TABLET ORAL at 08:20

## 2022-11-08 RX ADMIN — METHOCARBAMOL 500 MG: 500 TABLET ORAL at 16:49

## 2022-11-08 RX ADMIN — ATORVASTATIN CALCIUM 10 MG: 10 TABLET, FILM COATED ORAL at 08:20

## 2022-11-08 RX ADMIN — POLYETHYLENE GLYCOL 3350 17 G: 17 POWDER, FOR SOLUTION ORAL at 08:20

## 2022-11-08 RX ADMIN — OXYCODONE HYDROCHLORIDE 10 MG: 10 TABLET ORAL at 01:14

## 2022-11-08 RX ADMIN — LORAZEPAM 2 MG: 1 TABLET ORAL at 16:01

## 2022-11-08 RX ADMIN — METHOCARBAMOL 500 MG: 500 TABLET ORAL at 13:08

## 2022-11-08 RX ADMIN — OXYCODONE 5 MG: 5 TABLET ORAL at 18:10

## 2022-11-08 RX ADMIN — ASPIRIN 81 MG 81 MG: 81 TABLET ORAL at 08:20

## 2022-11-08 RX ADMIN — OXYCODONE HYDROCHLORIDE 10 MG: 10 TABLET ORAL at 13:46

## 2022-11-08 RX ADMIN — SENNOSIDES AND DOCUSATE SODIUM 2 TABLET: 50; 8.6 TABLET ORAL at 08:19

## 2022-11-08 RX ADMIN — ASPIRIN 81 MG 81 MG: 81 TABLET ORAL at 19:54

## 2022-11-08 RX ADMIN — OXYCODONE HYDROCHLORIDE 10 MG: 10 TABLET ORAL at 22:15

## 2022-11-08 RX ADMIN — OXYCODONE HYDROCHLORIDE 10 MG: 10 TABLET ORAL at 05:26

## 2022-11-08 RX ADMIN — LORAZEPAM 2 MG: 1 TABLET ORAL at 08:20

## 2022-11-08 RX ADMIN — OXYCODONE HYDROCHLORIDE 10 MG: 10 TABLET ORAL at 09:42

## 2022-11-08 RX ADMIN — LORAZEPAM 2 MG: 1 TABLET ORAL at 19:54

## 2022-11-08 ASSESSMENT — PAIN DESCRIPTION - DESCRIPTORS
DESCRIPTORS: ACHING;DISCOMFORT;TINGLING
DESCRIPTORS: ACHING
DESCRIPTORS: ACHING;DISCOMFORT
DESCRIPTORS: THROBBING
DESCRIPTORS: ACHING;DISCOMFORT
DESCRIPTORS: SHARP;SHOOTING

## 2022-11-08 ASSESSMENT — PAIN DESCRIPTION - FREQUENCY
FREQUENCY: CONTINUOUS
FREQUENCY: INTERMITTENT
FREQUENCY: CONTINUOUS

## 2022-11-08 ASSESSMENT — PAIN SCALES - GENERAL
PAINLEVEL_OUTOF10: 6
PAINLEVEL_OUTOF10: 1
PAINLEVEL_OUTOF10: 6
PAINLEVEL_OUTOF10: 5
PAINLEVEL_OUTOF10: 1
PAINLEVEL_OUTOF10: 6
PAINLEVEL_OUTOF10: 6
PAINLEVEL_OUTOF10: 3
PAINLEVEL_OUTOF10: 7
PAINLEVEL_OUTOF10: 3
PAINLEVEL_OUTOF10: 5

## 2022-11-08 ASSESSMENT — PAIN DESCRIPTION - PAIN TYPE
TYPE: SURGICAL PAIN

## 2022-11-08 ASSESSMENT — PAIN DESCRIPTION - LOCATION
LOCATION: HIP
LOCATION: ANKLE;OTHER (COMMENT)
LOCATION: HIP;OTHER (COMMENT)
LOCATION: LEG
LOCATION: HIP

## 2022-11-08 ASSESSMENT — PAIN DESCRIPTION - ORIENTATION
ORIENTATION: RIGHT

## 2022-11-08 ASSESSMENT — PAIN SCALES - WONG BAKER
WONGBAKER_NUMERICALRESPONSE: 0
WONGBAKER_NUMERICALRESPONSE: 0

## 2022-11-08 ASSESSMENT — PAIN DESCRIPTION - ONSET
ONSET: ON-GOING

## 2022-11-08 ASSESSMENT — PAIN - FUNCTIONAL ASSESSMENT
PAIN_FUNCTIONAL_ASSESSMENT: ACTIVITIES ARE NOT PREVENTED

## 2022-11-08 NOTE — CARE COORDINATION
Team Conference held today. Team reviewed progress, goals, DME and DC date. Team recommends DC to home tomorrow with home care orders for SN and PT.  DME recs:     TTB, walker basket, reacher, wh walker. DC to home on Wed 11-9-2022. Met with patient to review. She is in agreement with DC plan for tomorrow. IMM letter presented; she verbalized understanding. Reviewed DME:   She does not want TTB, walker basket, nor reacher. She does want a wh walker to be ordered and delivered to her room. Reviewed Home Care orders for her for pt/ot. She does not want to have this as she reports she wants a long break before she has to do therapy again. Returned call to dgethan, Kelsi Handy to review DC for tomorrow. She will order the DME for her mother from [facility]. She wants to use retail pharmacy. She will talk with her mother about setting up home care for herself.   [facility]     Case Management   [phone]    11/8/2022  4:49 PM

## 2022-11-08 NOTE — CARE COORDINATION
11/08/22 1620   IMM Letter   IMM Letter given to Patient/Family/Significant other/Guardian/POA/by: presented to patient by jackie Resendiz  pt verbalized understanding.    IMM Letter date given: 11/08/22   IMM Letter time given: 1538

## 2022-11-08 NOTE — PLAN OF CARE
Problem: Safety - Adult  Goal: Free from fall injury  11/8/2022 0926 by Nevin Waldron RN  Outcome: Progressing  Flowsheets (Taken 11/4/2022 1055 by Amrik Bearden RN)  Free From Fall Injury: Instruct family/caregiver on patient safety  11/8/2022 0408 by Anusha Kendall RN  Outcome: Progressing  Note: Patient free from falls this shift. Fall precautions in place at all times. Bed in lowest position with two side rails up and wheels locked. Call light within reach. Patient able and agreeable to contact for safety appropriately.        Problem: ABCDS Injury Assessment  Goal: Absence of physical injury  11/8/2022 0926 by Nevin Waldron RN  Outcome: Progressing  Flowsheets (Taken 11/8/2022 0405 by Anusha Kendall RN)  Absence of Physical Injury: Implement safety measures based on patient assessment  11/8/2022 0408 by Anusha Kendall RN  Outcome: Progressing  Flowsheets (Taken 11/8/2022 0405)  Absence of Physical Injury: Implement safety measures based on patient assessment

## 2022-11-08 NOTE — PROGRESS NOTES
Chen Dawn  11/8/2022  [id number]    Chief Complaint: Closed right hip fracture, sequela    Subjective:  Patient seen this AM. Patient will be discussed in rehab unit conference today with entire rehab team of PT, OT, Speech, Nursing, Social service, and rehab unit manager to determine how much progress the patient has made in therapies, and when patient will be ready for D/C to home safely. We think patient will be ready for D/C to home tomorrow, on Wednesday. She just requires supervision for transfers, gait of 200 feet, and to go up and down 12 steps. She is modified independent this morning with all of her ADL activities. She does live with her daughter, and will have help at home at discharge. We will plan on discharge to home tomorrow with a rolling walker, and PT with visiting nurse  Repeat labs this morning look good and are stable. ROS: No N/V, SOB, chest pain, chills or fever. Objective:  Patient Vitals for the past 24 hrs:   BP Temp Temp src Pulse Resp SpO2 Weight   11/08/22 0526 (!) 157/89 98.2 °F (36.8 °C) Oral 88 18 94 % 139 lb 1.8 oz (63.1 kg)   11/08/22 0144 -- -- -- -- 16 -- --   11/08/22 0114 -- -- -- -- 16 -- --   11/07/22 2139 -- -- -- -- 16 -- --   11/07/22 2109 -- -- -- -- 16 -- --   11/07/22 1428 (!) 149/77 98.4 °F (36.9 °C) Oral 77 16 95 % --   11/07/22 0919 -- -- -- 90 16 95 % --   11/07/22 0814 (!) 152/94 98.3 °F (36.8 °C) Oral 94 18 95 % --       Gen: No distress, pleasant. HEENT: Normocephalic, atraumatic. CV: Regular rate and rhythm. Resp: No respiratory distress. Abd: Soft, nontender   Ext: No edema. Pain with right hip movement. Neuro: Alert, oriented, appropriately interactive.        Wt Readings from Last 3 Encounters:   11/08/22 139 lb 1.8 oz (63.1 kg)   10/31/22 138 lb 3.7 oz (62.7 kg)   11/09/20 117 lb 8.1 oz (53.3 kg)       Laboratory data:   Lab Results   Component Value Date    WBC 10.3 11/03/2022    HGB 10.3 (L) 11/03/2022    HCT 30.5 (L) 11/03/2022 MCV 96.9 11/03/2022     11/03/2022       Lab Results   Component Value Date/Time     11/03/2022 07:13 AM    K 4.0 11/03/2022 07:13 AM     11/03/2022 07:13 AM    CO2 29 11/03/2022 07:13 AM    BUN 19 11/03/2022 07:13 AM    CREATININE <0.5 11/03/2022 07:13 AM    GLUCOSE 100 11/03/2022 07:13 AM    CALCIUM 8.2 11/03/2022 07:13 AM        Therapy progress:  PT  Assessment: Pt is limited this morning due to nausea, she was given anti-nausea medications prior to therapy but she reports these are not helping. Mobility is very limited due to this, pt did get OOB to use the bathroom, but that was the only functional mobility she was willing to perform at this time. Pt continues to be below baseline and will benefit from continued skilled therapy to address impaired strength, and functional mobility to allow a safe return to home with family. OT  Assessment: pt making steady gains w/ OT intervention. She is reporting nausea  & 5/10 R hip pain (RN aware but pt not due x 3 more hrs--already had combination of oxycodone, Robaxin and Zofran, now asking for Ativan but is not due). Pt used RW to/from bathrm w/ close SB/CGA --tactile cues to keep RW w/ her thru entire transfer. She is able to stand at sink to wash hands & perform oral care IND'ly. She tolerated UE HEP using 2 lb wts for 1 set of 15 for each of the 5 exercises      Body mass index is 19.96 kg/m². Assessment and Plan:     Angulated right neck femoral fracture-  S/P RIGHT total hip arthroplasty, 11/1, Dr. Jon Salgado, oxycodone    History of lumbar spine surgery, chronic low back pain-  lorazepam, Tylenol     HLD- Lipitor, ASA     Bowels: Schedule Miralax + Senna S. Follow bowel movements. Enema or suppository if needed. Bladder: Check PVR x 3. [address] if PVR > 200ml or if any volume is > 500 ml. Pain: Oxycodone is ordered prn.          Yonatan David MD 11/8/2022, 7:39 AM

## 2022-11-08 NOTE — PROGRESS NOTES
Occupational Therapy  Facility/Department: [facility]  Rehabilitation Occupational Therapy Daily Treatment Note/DC Summary    Date: 22  Patient Name: Bear Bermudez       Room: L1C-5301/5976-43  MRN: [id number]  Account: [de-identified]   : 1950  (69 y.o.) Gender: female            PM session: met in room, she was in bed, PCA taking her vitals. Pt completed bed mobility INDly, used RW from bed in/out of bathrm & completed toilet t/f IND'ly; pt ambulated over to sink to wash hands. OT placed basket on front of RW and reacher in basket--she was able to  5 items from floor IND'ly. Pt stopped at closet, moved outside frame of RW to place clothes into bag on floor of closet (SAFETY CONCERNS but no LOB). She was able to recall 4 out of 5 UE HEP for strengthening using 2 lb wts. Her biggest concern is 2 dogs at home--1 big dog, 1 small dog. Gave suggestion for dogs to be crated at night or blocked off in another room is she is ambulating w/ RW without anyone being home. Pt's daughter Kelsi Handy works remotely from home & will assist if needed. Pt able to ambulate across therapy gym, able to stand x 5-6 minutes while  Jeff cards. Tx time: 45 min, DC to home tomorrow w/ family support, home OT services [facility], [place] #[id number]         Past Medical History:  has a past medical history of Arthritis, Chronic back pain, Hyperlipidemia, and Restless leg syndrome. Past Surgical History:   has a past surgical history that includes back surgery; Tubal ligation; and Total hip arthroplasty (Right, 2022). Restrictions  Restrictions/Precautions: Weight Bearing; Fall Risk  Other position/activity restrictions: WBAT no precautions per Dr Laurie Moncada, reg diet  Right Lower Extremity Weight Bearing: Weight Bearing As Tolerated    Subjective  Subjective: met in room, she is semi reclined in bed, offered shower but only wanted to sponge bathe  Restrictions/Precautions: Weight Bearing; Fall Risk Objective     Cognition  Overall Cognitive Status: Exceptions  Safety Judgement: Decreased awareness of need for safety  Cognition Comment: pt w/ flat affect, is impulsive & restless. Can be forgetful--takes Ativan several times a day due to anxiety  Orientation  Overall Orientation Status: Within Functional Limits  Orientation Level: Oriented X4         ADL  Feeding  Assistance Level: Independent  Skilled Clinical Factors: pt reports being \"sick of this food\"--good compliance drinking water  Grooming/Oral Hygiene  Assistance Level: Independent  Skilled Clinical Factors: stood at sink for oral care & washing hands  Upper Extremity Bathing  Assistance Level: Set-up  Skilled Clinical Factors: while seated on shower chair, OT managed water on/off & adjusted temp; she was able to wash, rinse & dry UB w/ set up  Lower Extremity Bathing  Equipment Provided: Long-handled sponge  Assistance Level: Set-up  Skilled Clinical Factors: provided cues to remain seated for majority of LB bathing & using LH sponge to wash feet to reduce amount of bending over; MI to wash rinse & dry lars areas, held GB in 1 hand for support, rushes thru bathing (did not use washcloth despite set up) fatigue noted  Upper Extremity Dressing  Assistance Level: Independent  Skilled Clinical Factors: draped shirt over front of RW to transport into bathrm, IND w/ doff/donning shirt while seated  Lower Extremity Dressing  Assistance Level: Modified independent  Skilled Clinical Factors: safety concerns when bending over to thread underwear & pants thru feet, stood to manage clothing over hips, no LOB  Putting On/Taking Off Footwear  Assistance Level: Modified independent  Skilled Clinical Factors: able to doff/don adilia hose & non skid socks by bending over, slow to cross R leg due to pain--suggested she should avoid that position--safety concerns d/t dislocation risk  Toileting  Assistance Level:  Independent  Skilled Clinical Factors: stands unsupportive to manage clothing, able to wipe while seated  Toilet Transfers  Equipment: Grab bars  Assistance Level: Modified independent  Skilled Clinical Factors: used RW to approach toilet, used GB intermittently  Tub/Shower Transfers  Type: Shower  Transfer From: Rolling walker  Transfer To: Shower chair with back  Assistance Level: Modified independent  Skilled Clinical Factors: used GB to enter & exit shower & on/off shower chair, no LOB    Light Housekeeping  Light Housekeeping Level: Walker  Light Housekeeping Level of Assistance: Modified independent  Light Housekeeping: stood x 5 minutes to fold laundry, good balance     Functional Mobility  Device: Rolling walker  Activity: Retrieve items;Transport items  Assistance Level: Modified independent  Skilled Clinical Factors: MI to use RW @ her room, draped clothing over front of RW and transported to bathrm, seems anxious; occasional forgetfulness of safe hand placement & keeping RW w/ her AATs but no LOB  Bed Mobility  Overall Assistance Level: Independent  Sit to Supine  Assistance Level: Independent  Supine to Sit  Assistance Level: Independent  Transfers  Surface: From bed; To bed;Standard toilet  Device: Walker  Sit to Stand  Assistance Level: Modified independent  Skilled Clinical Factors: using RW no LOB  Stand to Sit  Assistance Level: Modified independent  Bed To/From Chair  Assistance Level: Modified independent  Skilled Clinical Factors: safety concerns to stay in frame of RW         Assessment  Assessment  Assessment: Pt has met 7/7 goals w/ OT intervention. She achieved a MI level w/ majority of ADLs, household t/f & fxl mobility using RW. She does not have any hip precautions & is WBAT however there are safety concerns when bending over or crossing R leg d/t increased pain. She completed shower level bathing w/ set up using shower chair, GB, LH sponge.  Educated pt on removal of throw rugs to reduce tripping hazard and using reacher to grab items off floor & place into basket. She had issues w/ nausea & tiredness however was pain med & Ativan seeking at times. She is returning home w/ family support, recommend home OT to ensure safe transition to home environment & during IADLs. OT recommended RW, TSF & reacher  Activity Tolerance: Patient tolerated treatment well;Patient limited by pain; Patient limited by fatigue  Discharge Recommendations: S Level 1;Home with assist PRN  OT Equipment Recommendations  Other: TBD--does not own any DME--may need RW, TSF, reacher  Safety Devices  Type of devices: Gait belt;Call light within reach; Bed alarm in place; Left in bed;Nurse notified    Patient Education  Education  Education Given To: Patient  Education Provided: ADL Function  Education Provided Comments: encouraged use of shower chair & LH sponge to reach feet & reduce fall & dislocation  Education Method: Demonstration;Verbal;Teach Back  Barriers to Learning: Cognition  Education Outcome: Verbalized understanding;Continued education needed  Skilled Clinical Factors: some impulsiveness, impaired STM    Plan  Occupational Therapy Plan  Times Per Week: 5-6  Times Per Day: Twice a day  Specific Instructions for Next Treatment: , kitchen tasks w/ basket  Current Treatment Recommendations: Strengthening;ROM;Balance training;Functional mobility training; Endurance training;Pain management; Safety education & training;Self-Care / ADL; Home management training;Modalities; Positioning;Patient/Caregiver education & training;Equipment evaluation, education, & procurement  Additional Comments: plans to DC home w/ family support, [facility] OT services    Goals  Patient Goals   Patient goals : \"get rid of this pain so I can sleep, walk & function properly\"  Short Term Goals  Time Frame for Short Term Goals: by 5 days pt will complete  Short Term Goal 1: toilet tasks w/ MI--MET  Short Term Goal 2: household transfers w/ MI using RW--MET  Short Term Goal 3: shower level bathing w/ set up using shower chair + GB--MET  Short Term Goal 4: tolerate standing x 8-10 minutes during kitchen/IADL tasks w/ MI--MET  Short Term Goal 5: UB & LB ADLs w/ MI using A/E prn--MET  Long Term Goals  Time Frame for Long Term Goals : by 5 days pt will complete  Long Term Goal 1: complete caregiver education & address DME needs prior to DC--partially met, addressed DME needs  Long Term Goal 2: provide UE HEP for strengthening to improve IND w/ sit<>stand & overall strength to 4+/5--MET        Therapy Time   Individual Concurrent Group PM-treatment   Time In 0900      1430   Time Out 1000      1515   Minutes 60      45   Timed Code Treatment Minutes: [address], OTR/L #[id number]

## 2022-11-08 NOTE — PROGRESS NOTES
Patient admitted to rehab with right total hip arthroplasty. A/Ox4. Transfers with walker x 1. Mobility restrictions: none. On regular diet, tolerating well. Medications taken whole. On aspirin for DVT prophylaxis. Skin: has surgical incision right hip; scattered bruising. Oxygen: none. LDA: none. Has been continent of bowel and continent of bladder. LBM 11/7. Chair/bed alarms in use and call light in reach. Will monitor for safety.

## 2022-11-08 NOTE — DISCHARGE INSTR - COC
Continuity of Care Form    Patient Name: Morgan Lizama   :  1950  MRN:  [id number]    [facility] date:  2022  Discharge date:  2022    Code Status Order: Full Code   Advance Directives:     Admitting Physician:  Azul Glez MD  PCP: Mitch Brito MD    [address] Unit/Room#: E7Y-7748/0758-69  Discharging Unit Phone Number: [phone]    Emergency Contact:   Extended Emergency Contact Information  Primary Emergency Contact: Meron Oconnor  Home Phone: [phone]  Mobile Phone: [phone]  Relation: Child  Secondary Emergency Contact: [address] [place] [address] Phone: [phone]  Relation: Child    Past Surgical History:  Past Surgical History:   Procedure Laterality Date    BACK SURGERY      x 5 disc surgeries    TOTAL HIP ARTHROPLASTY Right 2022    RIGHT ANTERIOR TOTAL HIP REPLACEMENT performed by Eri Triplett MD at [address]         Immunization History:   Immunization History   Administered Date(s) Administered    COVID-19, PFIZER PURPLE top, DILUTE for use, (age 15 y+), 30mcg/0.3mL 2022       Active Problems:  Patient Active Problem List   Diagnosis Code    Closed fracture of right hip ([facility] [address] S72.001A    Closed right hip fracture, initial encounter ([facility] [address] S72.001A    Closed right hip fracture, sequela S72.001S       Isolation/Infection:   Isolation            No Isolation          Patient Infection Status       None to display            Nurse Assessment:  Last Vital Signs: /85   Pulse 77   Temp 98.2 °F (36.8 °C) (Oral)   Resp 16   Ht 5' 10\" (1.778 m)   Wt 139 lb 1.8 oz (63.1 kg)   SpO2 94%   BMI 19.96 kg/m²     Last documented pain score (0-10 scale): Pain Level: 6  Last Weight:   Wt Readings from Last 1 Encounters:   22 139 lb 1.8 oz (63.1 kg)     Mental Status:  oriented, alert, coherent, logical, thought processes intact, and able to concentrate and follow conversation    IV Access:  - None    Nursing Mobility/ADLs:  Walking   Independent  Transfer  Independent  Bathing  Independent  Dressing  Independent  [address]  Med Delivery   whole    Wound Care Documentation and Therapy:  Incision 11/01/22 Hip Anterior;Right (Active)   Dressing Status Clean;Dry; Intact 11/08/22 0823   Dressing Change Due 11/02/22 11/02/22 0806   Incision Cleansed Not Cleansed 11/08/22 0823   Dressing/Treatment Open to air 11/08/22 0823   Closure Surgical glue 11/07/22 2010   Margins Approximated 11/07/22 2010   Incision Assessment Dry 11/08/22 0823   Drainage Amount None 11/08/22 0823   Odor None 11/08/22 0823   Ebonie-incision Assessment Intact 11/08/22 0823   Number of days: 6        Elimination:  Continence: Bowel: Yes  Bladder: Yes  Urinary Catheter: None   Colostomy/Ileostomy/Ileal Conduit: No       Date of Last BM: 11/07/2022    Intake/Output Summary (Last 24 hours) at 11/8/2022 1135  Last data filed at 11/8/2022 1005  Gross per 24 hour   Intake 120 ml   Output 1 ml   Net 119 ml     I/O last 3 completed shifts: In: 840 [P.O.:840]  Out: -     Safety Concerns:     None    Impairments/Disabilities:      None    Nutrition Therapy:  Current Nutrition Therapy:   - Oral Diet:  General    Routes of Feeding: Oral  Liquids: Thin Liquids  Daily Fluid Restriction: no  Last Modified Barium Swallow with Video (Video Swallowing Test): not done    Treatments at the Time of Hospital Discharge:   Respiratory Treatments: N/A  Oxygen Therapy:  is not on home oxygen therapy. Ventilator:    - No ventilator support    Rehab Therapies: Physical Therapy and Occupational Therapy  Weight Bearing Status/Restrictions: No weight bearing restrictions  Other Medical Equipment (for information only, NOT a DME order):  walker  Other Treatments:Monitor surgical site.   HOME HEALTH CARE: LEVEL 1 STANDARD    Home health agency to establish plan of care for patient over 60 day period Nursing  Initial home SN evaluation visit to occur within 24-48 hours for:  medication management  VS and clinical assessment  S&S chronic disease exacerbation education + when to contact MD / NP  care coordination  Medication Reconciliation during 1st SN visit       PT/OT   Evaluate with goal of regaining prior level of functioning   OT to evaluate if patient has [address] needs for personal care      PCP Visit scheduled within 7 days of hospital discharge       Patient's personal belongings (please select all that are sent with patient):  Glasses    RN SIGNATURE:  Electronically signed by Gilda Mena RN on 11/9/22 at 10:34 AM EST    CASE MANAGEMENT/SOCIAL WORK SECTION    Inpatient Status Date: 11-2-2022    Readmission Risk Assessment Score:  Readmission Risk              Risk of Unplanned Readmission:  10           Discharging to Facility/ Agency   Name:    [address] [place] [address]   Address:  Phone:   [phone]  Fax:    / signature: Electronically signed by Milton Dennison on 11/9/22 at 11:00 AM EST    PHYSICIAN SECTION    Prognosis: Good    Condition at Discharge: Stable    Rehab Potential (if transferring to Rehab): Good    Recommended Labs or Other Treatments After Discharge: PT,VN    Physician Certification: I certify the above information and transfer of Sadia Alvarez  is necessary for the continuing treatment of the diagnosis listed and that she requires Home Care for less 30 days.      Update Admission H&P: No change in H&P    PHYSICIAN SIGNATURE:  Electronically signed by Lily Shi MD on 11/8/22 at 11:36 AM EST

## 2022-11-08 NOTE — PROGRESS NOTES
Physical Therapy  Facility/Department: [address] REHAB  Physical Therapy treatment session and discharge summary    Name: Tito Oliva  : 1950  MRN: [id number]  Date of Service: 2022    Discharge Recommendations:  Home with Home health PT, Home with assist PRN, Patient would benefit from continued therapy after discharge   PT Equipment Recommendations  Equipment Needed: Yes  Mobility Devices: Simmie Lime: Rolling      Patient Diagnosis(es): The primary encounter diagnosis was Closed right hip fracture, sequela. A diagnosis of Closed right hip fracture, initial encounter [facility]) was also pertinent to this visit. Past Medical History:  has a past medical history of Arthritis, Chronic back pain, Hyperlipidemia, and Restless leg syndrome. Past Surgical History:  has a past surgical history that includes back surgery; Tubal ligation; and Total hip arthroplasty (Right, 2022). Assessment   Body Structures, Functions, Activity Limitations Requiring Skilled Therapeutic Intervention: Decreased functional mobility ; Decreased ADL status; Increased pain;Decreased balance  Assessment: Pt now mod I for transfers and community distance ambulation with RW. Safe to d/c home tomorrow with assist PRN and home health PT. Will need RW for safe mobility. Treatment Diagnosis: difficultyl walking  Therapy Prognosis: Good  Requires PT Follow-Up: Yes  Activity Tolerance  Activity Tolerance: Patient tolerated treatment well     Plan   Physcial Therapy Plan  General Plan:  minutes of therapy at least 5 out of 7 days a week  Current Treatment Recommendations: Strengthening, ROM, Functional mobility training, Transfer training, ADL/Self-care training, Gait training, Positioning, Modalities, Therapeutic activities, Patient/Caregiver education & training  Safety Devices  Type of Devices:  All fall risk precautions in place, Gait belt, Call light within reach, Bed alarm in place, Left in bed  Restraints  Restraints Initially in Place: No     Restrictions  Restrictions/Precautions  Restrictions/Precautions: Weight Bearing, Fall Risk  Lower Extremity Weight Bearing Restrictions  Right Lower Extremity Weight Bearing: Weight Bearing As Tolerated  Position Activity Restriction  Other position/activity restrictions: WBAT no precautions per Dr Yuan Ahr, reg diet     Subjective   General  Chart Reviewed: Yes  Patient assessed for rehabilitation services?: Yes  Additional Pertinent Hx: here due to fall in bathroom at home - found to have right hip fracture, R MACI  Response To Previous Treatment: Patient with no complaints from previous session. Family / Caregiver Present: No  Referring Practitioner: Elie  Referral Date : 11/02/22  Diagnosis: R hip fx  Follows Commands: Within Functional Limits  Subjective  Subjective: Reports 5/10 pain in R thigh. Had pain med. Daphene Quails to go home tomorrow. Agreeable to therapy. Social/Functional History  Social/Functional History  Lives With: Daughter (son in law and grand children.  family works during the day)  Type of Home: [place] [address] [place] [address] [place]: Two level, Bed/Bath upstairs (laundry room on 2nd fl; has 1/2 bath on 1st fl)  Home Access: Stairs to enter without rails  Entrance Stairs - Number of Steps: 1+1 in front, 2 RENITA in back, pt usually goes in from the front (7+6 steps w/ 1 HR to upstairs)  Bathroom Shower/Tub: Tub/Shower unit  Bathroom Toilet: Standard  Bathroom Accessibility: Not accessible (doesn't think RW will fit)  Has the patient had two or more falls in the past year or any fall with injury in the past year?: No  ADL Assistance: Independent  Homemaking Assistance: Independent (shares)  Ambulation Assistance: Independent  Transfer Assistance: Independent  Active : Yes  Mode of Transportation: Car  Occupation: Retired  Type of Occupation:   Leisure & Hobbies: takes care of grandson with autism, watches TV, is a homebody  IADL Comments: Term Goals : STG=LTG  Patient Goals   Patient Goals : pain relief and get home            Therapy Time   Individual Concurrent Group Co-treatment   Time In 4762         Time Out 1200         Minutes 45         Timed Code Treatment Minutes: 45 Minutes       Electronically signed by Jareth Bhatti, [place] [id number] on 11/8/2022 at 11:59 AM

## 2022-11-08 NOTE — PROGRESS NOTES
Physical Therapy  Facility/Department: [facility] REHAB  Rehabilitation Physical Therapy Treatment Note    NAME: Lucy Rogers  : 1950 (60 y.o.)  MRN: [id number]  CODE STATUS: Full Code    Date of Service: 22       Restrictions:  Restrictions/Precautions: Weight Bearing; Fall Risk  Lower Extremity Weight Bearing Restrictions  Right Lower Extremity Weight Bearing: Weight Bearing As Tolerated  Position Activity Restriction  Other position/activity restrictions: WBAT R LE. No positional restrictions per surgeon Dr Joanne Rodriguez, reg diet     SUBJECTIVE  Subjective  Subjective: Met patient in therapy dept, patient in [facility]. Reports being tired; hoping  to take a nap. Reports 6/10 R anterior and lateral thigh pain. OBJECTIVE  Cognition  Overall Cognitive Status: Exceptions  Safety Judgement: Decreased awareness of need for safety  Cognition Comment: pt w/ flat affect, is impulsive & restless. Can be forgetful--takes Ativan several times a day due to anxiety  Orientation  Overall Orientation Status: Within Functional Limits  Orientation Level: Oriented X4    Functional Mobility  Bed Mobility  Overall Assistance Level: Modified Independent  Additional Factors: Head of bed flat; Without handrails  Sit to Supine  Assistance Level: Modified independent  Scooting  Assistance Level: Modified independent  Balance  Sitting Balance: Modified independent   Standing Balance: Supervision (with bilateral UE support on walker)  Transfers  Surface: To bed;From bed; To chair with arms;From chair with arms (Bathroom commode, with B handrails.)  Device: Walker  Sit to Stand  Assistance Level: Supervision  Stand to Sit  Assistance Level: Supervision (cues x 1 for hand placement when sitting from RW to bed.)  Bed To/From Chair  Technique: Stand step  Assistance Level: Supervision;Modified independent  Skilled Clinical Factors: with wheeled walker      Environmental Mobility  Ambulation  Device: Rolling walker  Distance: Patient fatigued, and ambulated long distances in the am. PM:  10' x 2, into and out of bathroom. Activity:  (Patient uses commode with Supervision-Mod Indep, including lars care)  Activity Comments: Step through pattern. Buffy Min increased, as she appears anxious to complete tasks and return to bed. Assistance Level: Modified independent  PT Exercises  Exercise Treatment: Supine in bed:  B ankle pumps, Hip Abduction/SLR Active L LE, and limited ROM, AAROM R LE. Glut/Adductor/Quad Sets, all x 10 B with initial cues. Exercise Equipment: x5 min NuStep BUEs and BLEs, resistance level 3      ASSESSMENT/PROGRESS TOWARDS GOALS    Assessment  Assessment: Ms. Sorin Montemayor presents with and reports R anterior thigh pain, and reports moderate fatigue in pm session. Gait quality Good. She is now supervision for transfers and is ambulating room distances (not max distance)  with wheeled walker with supervision. She performed toileting and urinated on commode Mod INdep. Patient is now modified independence with bed mobility. She is progressing to the point that discharge to home with initial 24/7 support and home PT is appropriate, as per stated DC plan. Will continue to see and progress to tolerance until DC, as she will benefit from continued skilled therapy for strengthening, gait training, and functional mobility training to allow for safe return home with family.   Activity Tolerance: Patient tolerated treatment well;Patient limited by fatigue (Patient requesting Ativan; RN called and informed.)  Discharge Recommendations: Home with Home health PT;Home with assist PRN;Patient would benefit from continued therapy after discharge  PT Equipment Recommendations  Equipment Needed: Yes  Mobility Devices: Edmonson Phillip: Rolling    Goals  Patient Goals   Patient Goals : pain relief and get home  Short Term Goals  Time Frame for Short Term Goals: 7-10 days  Short Term Goal 1: bed mobility modif I- MET  Short Term Goal 2: all transfers modif I- MET  Short Term Goal 3: amb >80' modif I with wh walker- MET  Short Term Goal 4: 12 steps R rail modif I- MET  Short Term Goal 5: HEP I  Long Term Goals  Time Frame for Long Term Goals : STG=LTG    PLAN OF CARE/SAFETY  Physcial Therapy Plan  General Plan:  minutes of therapy at least 5 out of 7 days a week  Current Treatment Recommendations: Strengthening;ROM; Functional mobility training;Transfer training;ADL/Self-care training;Gait training;Positioning;Modalities; Therapeutic activities; Patient/Caregiver education & training  Safety Devices  Type of Devices: All fall risk precautions in place;Gait belt;Call light within reach; Bed alarm in place; Patient at risk for falls; Left in bed;Nurse notified  Restraints  Restraints Initially in Place: No    EDUCATION  Education  Education Given To: Patient  Education Provided: Role of Therapy;Plan of Care;Equipment;Transfer Training  Education Provided Comments: cues for controlled exercise performance.   Education Method: Verbal  Barriers to Learning: None  Education Outcome: Verbalized understanding;Continued education needed        Therapy Time   Individual Concurrent Group Co-treatment   Time In 1515         Time Out 1545         Minutes 30           Timed Code Treatment Minutes: [address], PT, 11/08/22 at 4:05 PM

## 2022-11-08 NOTE — PROGRESS NOTES
Provision of Current Reconciled Medication List to Subsequent Provider at Discharge  [x]No, current reconciled medication list not provided to the subsequent provider. []Yes, current reconciled medication list provided to the subsequent provider. (**Select route of transmission below**)   [] [address] [place] [address] Drive Exchange Organization  [] Verbal (e.g. in person, telephone, video conferencing)  []Paper-based (e.g. fax, copies, printouts)   []Other Methods (e.g. texting, email, CDs)    Provision of Current Reconciled Medication List to Patient at Discharge  []No, current reconciled medication list not provided to the patient, family and/or caregiver. [x]Yes, current reconciled medication list provided to the patient, family and/or caregiver.  (**Select route of transmission below**)   [] Via Electronic Health Record (e.g., electronic access to patient portal)   [] Via Health Information Exchange Organization  [] Verbal (e.g. in person, telephone, video conferencing)  [x]Paper-based (e.g. fax, copies, printouts)   []Other Methods (e.g. texting, email, CDs)    High Risk Drug Classes:  Use and Indication    Is taking: Check if the pt is taking any medications by pharmacological classification, not how it is used, in the following classes  Indication noted: If column 1 is checked, check if there is an indication noted for all meds in the drug class Is taking  (check all that apply) Indication noted (check all that apply)   Antipsychotic [] []   Anticoagulant [] []   Antibiotic [] []   Opioid [x] [x]   Antiplatelet [] []   Hypoglycemic (including insulin) [] []   None of the above []     Special Treatments, Procedures, and Programs    Check all of the following treatments, procedures, and programs that apply at discharge. At Discharge (check all that apply)   Cancer Treatments   A1. Chemotherapy []           A2. IV []           A3. Oral []           A10.  Other []   B1. Radiation [] Respiratory Therapies   C1. Oxygen Therapy []           C2. Continuous []           C3. Intermittent []           C4. High-concentration []   D1. Suctioning []           D2. Scheduled []           D3. As needed []   E1. Tracheostomy Care []   F1. Invasive Mechanical Ventilator (ventilator or respirator) []   G1. Non-invasive Mechanical Ventilator []           G2. BiPAP []           G3. CPAP []   Other   H1. IV Medications []           H2. Vasoactive medications []           H3. Antibiotics []           H4. Anticoagulation []           H10. Other []   I1. Transfusions []   J1. Dialysis []           J2. Hemodialysis []           J3. Peritoneal dialysis []   O1. IV access []           O2. Peripheral []           O3. Midline []           O4.  Central (PICC, tunneled, port) []      None of the above (select if no Cancer, Respiratory, or Other boxes are checked) [x]

## 2022-11-08 NOTE — CARE COORDINATION
SW contacted by Daughter-   Her mother called her and advised her of discharge tomorrow, Daughter reported that she has tried to reach [place]. Confirmed that her mother is discharging tomorrow, this was determined at [facility] today. Daughter initially upset that she has not been contacted. Daughter advised that she be at the hospital at 10am to  her mother, she asked that her mother's medications could be filled by [facility], advised that patient should also be getting a rolling walker.

## 2022-11-08 NOTE — PLAN OF CARE
Problem: Safety - Adult  Goal: Free from fall injury  Outcome: Progressing  Note: Patient free from falls this shift. Fall precautions in place at all times. Bed in lowest position with two side rails up and wheels locked. Call light within reach. Patient able and agreeable to contact for safety appropriately. Problem: Pain  Goal: Verbalizes/displays adequate comfort level or baseline comfort level  Outcome: Progressing  Note: Pt able to express presence/absence of pain and rate pain appropriately using numerical scale. Pain/discomfort being managed with PRN analgesics per MD orders (see MAR). Pain assessed every shift and after interventions.        Problem: Skin/Tissue Integrity - Adult  Goal: Skin integrity remains intact  Outcome: Progressing  Flowsheet  Skin Integrity Remains Intact: Monitor for areas of redness and/or skin breakdown

## 2022-11-09 VITALS
BODY MASS INDEX: 20.07 KG/M2 | WEIGHT: 140.21 LBS | HEART RATE: 77 BPM | TEMPERATURE: 98 F | RESPIRATION RATE: 16 BRPM | OXYGEN SATURATION: 95 % | DIASTOLIC BLOOD PRESSURE: 88 MMHG | HEIGHT: 70 IN | SYSTOLIC BLOOD PRESSURE: 146 MMHG

## 2022-11-09 LAB
GLUCOSE BLD-MCNC: 94 MG/DL (ref 70–99)
PERFORMED ON: NORMAL

## 2022-11-09 PROCEDURE — 6370000000 HC RX 637 (ALT 250 FOR IP): Performed by: PHYSICAL MEDICINE & REHABILITATION

## 2022-11-09 PROCEDURE — 94760 N-INVAS EAR/PLS OXIMETRY 1: CPT

## 2022-11-09 PROCEDURE — 6370000000 HC RX 637 (ALT 250 FOR IP): Performed by: ORTHOPAEDIC SURGERY

## 2022-11-09 RX ADMIN — OXYCODONE HYDROCHLORIDE 10 MG: 10 TABLET ORAL at 07:40

## 2022-11-09 RX ADMIN — SENNOSIDES AND DOCUSATE SODIUM 2 TABLET: 50; 8.6 TABLET ORAL at 07:40

## 2022-11-09 RX ADMIN — METHOCARBAMOL 500 MG: 500 TABLET ORAL at 07:40

## 2022-11-09 RX ADMIN — ATORVASTATIN CALCIUM 10 MG: 10 TABLET, FILM COATED ORAL at 07:40

## 2022-11-09 RX ADMIN — POLYETHYLENE GLYCOL 3350 17 G: 17 POWDER, FOR SOLUTION ORAL at 07:40

## 2022-11-09 RX ADMIN — LORAZEPAM 2 MG: 1 TABLET ORAL at 05:48

## 2022-11-09 RX ADMIN — OXYCODONE HYDROCHLORIDE 10 MG: 10 TABLET ORAL at 03:21

## 2022-11-09 RX ADMIN — ASPIRIN 81 MG 81 MG: 81 TABLET ORAL at 07:41

## 2022-11-09 ASSESSMENT — PAIN DESCRIPTION - ORIENTATION
ORIENTATION: RIGHT
ORIENTATION: RIGHT

## 2022-11-09 ASSESSMENT — PAIN DESCRIPTION - LOCATION
LOCATION: HIP
LOCATION: LEG

## 2022-11-09 ASSESSMENT — PAIN DESCRIPTION - FREQUENCY: FREQUENCY: CONTINUOUS

## 2022-11-09 ASSESSMENT — PAIN DESCRIPTION - DESCRIPTORS
DESCRIPTORS: ACHING;SHOOTING
DESCRIPTORS: ACHING

## 2022-11-09 ASSESSMENT — PAIN SCALES - GENERAL
PAINLEVEL_OUTOF10: 7
PAINLEVEL_OUTOF10: 7

## 2022-11-09 ASSESSMENT — PAIN DESCRIPTION - PAIN TYPE: TYPE: SURGICAL PAIN

## 2022-11-09 ASSESSMENT — PAIN - FUNCTIONAL ASSESSMENT
PAIN_FUNCTIONAL_ASSESSMENT: ACTIVITIES ARE NOT PREVENTED
PAIN_FUNCTIONAL_ASSESSMENT: ACTIVITIES ARE NOT PREVENTED

## 2022-11-09 ASSESSMENT — PAIN DESCRIPTION - ONSET: ONSET: ON-GOING

## 2022-11-09 NOTE — CARE COORDINATION
[facility]/ Melissa MORENO order noted, all docs needed have been faxed to [facility] for home care services.     Home care to see patient within 24-48 hrs    Kaylee Bello RN, BSN CTN  [facility] [phone]

## 2022-11-09 NOTE — PROGRESS NOTES
Discharge orders acknowledged by RN . Discharge teaching completed with pt and family. AVS reviewed and all questions answered. Medication regimen reviewed and pt understands schedule. Follow up appointments also reviewed with pt and resources given for discharge. Pt was sent electronic to be filled and understands schedule. IV removed. Bedside monitor removed from pt. 60+ minutes of education completed. Required core measures completed. Pt vitals WDL. Pt discharged with all belongings to home with pt and daughter. Pt transported off of unit via wheelchair. No complications.

## 2022-11-09 NOTE — PROGRESS NOTES
Patient admitted to rehab with closed right hip fracture, sequela. A/Ox4. Transfers with walker x1. Mobility restrictions: none. On regular diet, tolerating well. Medications taken whole. On aspirin for DVT prophylaxis. Skin: incision to right hip, scattered bruising. Oxygen: room air. LDA: none. Has been continent of bowel and of bladder. LBM 11/7/22. Chair/bed alarms in use and call light in reach. Will monitor for safety.  Dinesh Barclay RN

## 2022-11-09 NOTE — PROGRESS NOTES
Shirin Dec  11/9/2022  [id number]    Chief Complaint: Closed right hip fracture, sequela    Subjective:  Patient seen this AM. Patient was discussed in rehab unit conference yesterday with entire rehab team , and we think the patient will be ready for D/C to home today. She just requires supervision for transfers, gait of 200 feet, and to go up and down 12 steps. She is modified independent this morning with all of her ADL activities. She does live with her daughter, and will have help at home at discharge. We will plan on discharge to home today with a rolling walker, and PT with visiting nurse. Patient does not want any home therapy at this time. I have filled out her NARINDER and meds for her discharge today. ROS: No N/V, SOB, chest pain, chills or fever. Objective:  Patient Vitals for the past 24 hrs:   BP Temp Temp src Pulse Resp SpO2 Weight   11/09/22 0730 (!) 146/88 98 °F (36.7 °C) Oral 75 16 -- --   11/09/22 0551 (!) 142/79 98.6 °F (37 °C) Oral 81 16 95 % 140 lb 3.4 oz (63.6 kg)   11/09/22 0351 -- -- -- -- 16 -- --   11/09/22 0321 -- -- -- -- 16 -- --   11/08/22 2245 -- -- -- -- 16 -- --   11/08/22 2215 -- -- -- -- 16 -- --   11/08/22 1840 -- -- -- -- 16 -- --   11/08/22 1810 -- -- -- -- 16 -- --   11/08/22 1433 139/86 98.6 °F (37 °C) Oral 84 16 93 % --   11/08/22 1416 -- -- -- -- 16 -- --   11/08/22 1346 -- -- -- -- 16 -- --   11/08/22 1012 -- -- -- -- 16 -- --   11/08/22 0942 -- -- -- -- 16 -- --   11/08/22 0827 -- -- -- 77 16 94 % --   11/08/22 0820 131/85 -- -- 78 -- -- --       Gen: No distress, pleasant. HEENT: Normocephalic, atraumatic. CV: Regular rate and rhythm. Resp: No respiratory distress. Abd: Soft, nontender   Ext: No edema. Pain with right hip movement. Neuro: Alert, oriented, appropriately interactive.        Wt Readings from Last 3 Encounters:   11/09/22 140 lb 3.4 oz (63.6 kg)   10/31/22 138 lb 3.7 oz (62.7 kg)   11/09/20 117 lb 8.1 oz (53.3 kg)       Laboratory data:   Lab Results   Component Value Date    WBC 11.2 (H) 11/08/2022    HGB 12.7 11/08/2022    HCT 38.9 11/08/2022    MCV 96.9 11/08/2022     11/08/2022       Lab Results   Component Value Date/Time     11/08/2022 07:25 AM    K 3.5 11/08/2022 07:25 AM    K 4.0 11/03/2022 07:13 AM     11/08/2022 07:25 AM    CO2 28 11/08/2022 07:25 AM    BUN 12 11/08/2022 07:25 AM    CREATININE <0.5 11/08/2022 07:25 AM    GLUCOSE 84 11/08/2022 07:25 AM    CALCIUM 8.6 11/08/2022 07:25 AM        Therapy progress:  PT  Assessment: Pt is limited this morning due to nausea, she was given anti-nausea medications prior to therapy but she reports these are not helping. Mobility is very limited due to this, pt did get OOB to use the bathroom, but that was the only functional mobility she was willing to perform at this time. Pt continues to be below baseline and will benefit from continued skilled therapy to address impaired strength, and functional mobility to allow a safe return to home with family. OT  Assessment: pt making steady gains w/ OT intervention. She is reporting nausea  & 5/10 R hip pain (RN aware but pt not due x 3 more hrs--already had combination of oxycodone, Robaxin and Zofran, now asking for Ativan but is not due). Pt used RW to/from bathrm w/ close SB/CGA --tactile cues to keep RW w/ her thru entire transfer. She is able to stand at sink to wash hands & perform oral care IND'ly. She tolerated UE HEP using 2 lb wts for 1 set of 15 for each of the 5 exercises      Body mass index is 20.12 kg/m². Assessment and Plan:   Discharge to home today in the care of her daughter. Angulated right neck femoral fracture-  S/P RIGHT total hip arthroplasty, 11/1, Dr. Asad Montoya, oxycodone    History of lumbar spine surgery, chronic low back pain-  lorazepam, Tylenol     HLD- Lipitor, ASA     Bowels: Schedule Miralax + Senna S. Follow bowel movements. Enema or suppository if needed.       Bladder: Check PVR x 3.  IMC if PVR > 200ml or if any volume is > 500 ml. Pain: Oxycodone is ordered prn.          Kenny Moreno MD 11/9/2022, 7:49 AM

## 2022-11-09 NOTE — CARE COORDINATION
Spoke with patient about change in home care preference. She is now agreeable to home care services. Reviewed with her the List of CMS star rated list of agencies and gave education regarding cms star rating system. She wants to discuss with her daughter when she comes to pick her up. Dgtr arrived; spoke with her about home care agencies and star rating system. They chose [facility] as first choice and then [facility] as second. The Plan for Transition of Care is related to the following treatment goals: to continue her progress in personal care and ambulation needs in home setting. The Patient and/or patient representative dgtr  was provided with a choice of provider and agrees   with the discharge plan. [x] Yes [] No    Freedom of choice list was provided with basic dialogue that supports the patient's individualized plan of care/goals, treatment preferences and shares the quality data associated with the providers. [x] Yes [] No  Referral initiated to [facility] for [facility].   [place], [place]     Case Management   [phone]    11/9/2022  11:02 AM

## 2022-11-09 NOTE — CARE COORDINATION
[facility]    Referral received from  to follow for home care services. I will follow for needs, and speak with patient to verify demos.     Luiza Prakash RN, BSN CTN  [facility] [phone]

## 2022-11-09 NOTE — DISCHARGE INSTR - DIET

## 2022-11-09 NOTE — CARE COORDINATION
SOCIAL WORK DISCHARGE SUMMARY        DATE OF DISCHARGE:    Wed 11-9-2022. LOCATION:    [address] [place] [address]    [phone]         TIME:   11:00 am        PHARMACY:  [facility]        DME:     wh walker.       [facility], [place], operated by [facility]     Case Management   [phone]    11/9/2022  11:17 AM

## 2022-11-09 NOTE — DISCHARGE SUMMARY
[facility]  Dr. Davonte Gilbert Discharge Summary     Patient Identification:  Tashi Romero  : 1950  Admit date: 2022  Discharge date: 22   Attending provider: Brandy Toth MD        Primary care provider: Kitty Siddiqi MD     Discharge Diagnoses:   Patient Active Problem List   Diagnosis    Closed fracture of right hip [facility])    Closed right hip fracture, initial encounter [facility])    Closed right hip fracture, sequela         Discharge Functional Status:    Physical therapy:  Bed Mobility: Scooting: Independent  Transfers: Sit to Stand: Modified independent  Stand to Sit: Modified independent  Bed to Chair: Modified independent (ambulating with RW), WB Status: WBAT RLE  Ambulation  Surface: Level tile  Device: Rolling Walker  Assistance: Modified Independent  Quality of Gait: good WB on RLE with step thru gait pattern, slightly forward flexed posture  Distance: approx 400' with multiple turns including approx 30' on carpet; short distances in therapy gym  Comments: pt anxious, needs cues for technique  More Ambulation?: No, Stairs  # Steps : 12  Stairs Height: 6\"  Rails: Bilateral  Curbs: 6\" (one curb step)  Device: Rolling walker, No Device (no device steps, RW curb step)  Assistance: Modified independent   Comment: non-reciprocal gait pattern on steps  Mobility:  , PT Equipment Recommendations  Equipment Needed: Yes  Mobility Devices: Chilango Simpsonch: Rolling, Assessment: Pt now mod I for transfers and community distance ambulation with RW. Safe to d/c home tomorrow with assist PRN and home health PT. Will need RW for safe mobility. Occupational therapy:  ,  , Assessment: 75 yo female admitted for a fall resulting in RIGHT hip femoral neck fracture with displacement. s/p  R MACI now WBAT. Per Dr. Betty Tsang no precautions. PMH: Chronic back pain, Restless leg syndrome. PTA, pt lives with family and fully IND no use of AD.  Pt anxious & restless which impacts her safety. Pt required SB A for bed mobility, close SB/CGA w/ fxl tx, and mobility with RW-- mod cues to slow pace and RW management. She required up to CGA w/ doff/donning underwear & pants, max A to manage non skid socks & adilia hose. Today, pt functioning below occupational performance baseline limited by R hip pain and decreased endurance and safety awareness. She would benefit from OT services on rehab x 5 days to improve her IND w/ ADLs, t/f & safety w/ fxl mobility in order to return home w/ family support        Inpatient Rehabilitation Course: Anne Yost is a 74 y.o. female admitted to inpatient rehabilitation on 11/2/2022 for s/p Closed right hip fracture, sequela. The patient participated in an aggressive multidisciplinary inpatient rehabilitation program involving 3 hours per day, 5 days per week of rehabilitation. 75 yo female admitted for a fall resulting in RIGHT hip femoral neck fracture with displacement. Patient is now  s/p 11/1 R MACI now WBAT. Per Dr. Jael Ogden no precautions. PMH: Chronic back pain, Restless leg syndrome. Patient lives with family and fully IND no use of AD. Patient started therapies, but is limited by pain and is agreeable to rehab unit treatment before discharge to home   After admission to the Rehab Unit, she made steady progress in her therapies as listed above under each therapy section. Her upper and lower extremity coordination and strength did improve in therapy, and she was starting to improve with her endurance and functional status as she  participated in her rehab therapies. Her right hip pain, balance, labs and blood pressure were monitored while on the rehabilitation unit. Patient seen this AM. Patient was discussed in rehab unit conference yesterday with entire rehab team , and we think the patient will be ready for D/C to home today. She just requires supervision for transfers, gait of 200 feet, and to go up and down 12 steps.   She is modified independent this morning with all of her ADL activities. She does live with her daughter, and will have help at home at discharge. We will plan on discharge to home today with a rolling walker, and PT with visiting nurse. Patient does not want any home therapy at this time. I have filled out her NARINDER and meds for her discharge today. She will follow-up with her orthopedic doctor, Dr. Reyes Maris, and her family doctor after discharge. Condition at Discharge: Good    Significant Diagnostics:   Lab Results   Component Value Date     11/08/2022    K 3.5 11/08/2022     11/08/2022    BUN 12 11/08/2022    CREATININE <0.5 (L) 11/08/2022    GLUCOSE 84 11/08/2022    CALCIUM 8.6 11/08/2022     Lab Results   Component Value Date    WBC 11.2 (H) 11/08/2022    RBC 4.02 11/08/2022    HGB 12.7 11/08/2022    HCT 38.9 11/08/2022    MCV 96.9 11/08/2022    MCH 31.5 11/08/2022    MCHC 32.5 11/08/2022    RDW 13.9 11/08/2022     11/08/2022    MPV 8.4 11/08/2022     Lab Results   Component Value Date    PROTIME 14.3 10/31/2022    INR 1.12 10/31/2022     No results found for: APTT  No results found for: Spalding Mena, GLUCOSEU, BILIRUBINUR, KETUA, SPECGRAV, BLOODU, PHUR, PROTEINU, UROBILINOGEN, NITRU, LEUKOCYTESUR, LABMICR, URRFLXCULT, URINETYPE  No results found for: MUCUS, WBCUA, EPIU, BACTERIA  No results found for: Armanod Owens, LABURIN    XR HIP RIGHT (2-3 VIEWS)    Result Date: 11/1/2022  Radiology exam is complete. No Radiologist dictation. Please follow up with ordering provider. CT HEAD WO CONTRAST    Result Date: 11/4/2022  EXAMINATION: CT OF THE HEAD WITHOUT CONTRAST  10/31/2022 11:50 am TECHNIQUE: CT of the head was performed without the administration of intravenous contrast. Automated exposure control, iterative reconstruction, and/or weight based adjustment of the mA/kV was utilized to reduce the radiation dose to as low as reasonably achievable.  COMPARISON: None HISTORY: [address] PROVIDED HISTORY: fall TECHNOLOGIST PROVIDED HISTORY: Has a \"code stroke\" or \"stroke alert\" been called? ->No Reason for exam:->fall Reason for Exam: fall FINDINGS: BRAIN/VENTRICLES: Patient's head is tilted toward the right in the CT gantry. Ventricular system is within normal limits. No evidence of mass effect or midline shift. Subtle foci of low attenuation within periventricular/subcortical white matter are identified. Finding likely related to changes of mild ischemic leukoencephalopathy. No abnormal extra-axial fluid collection is identified. There is minimal atherosclerotic calcification of distal internal carotid arteries. ORBITS: The visualized portion of the orbits demonstrate no acute abnormality. SINUSES: The visualized paranasal sinuses and mastoid air cells demonstrate no acute abnormality. There is mucosal thickening within several ethmoid air cells. Minimal mucosal thickening identified within frontal, sphenoid, and maxillary sinuses. There is leftward deviation of the nasal septum. There is right-sided julito bullosa of middle nasal turbinate. SOFT TISSUES/SKULL:  No acute abnormality of the visualized skull or soft tissues. No evidence of acute intracranial abnormality. XR CHEST 1 VIEW    Result Date: 10/31/2022  EXAMINATION: ONE XRAY VIEW OF THE CHEST 10/31/2022 12:57 pm COMPARISON: None. HISTORY: ORDERING SYSTEM PROVIDED HISTORY: preop TECHNOLOGIST PROVIDED HISTORY: Reason for exam:->preop Reason for Exam: preop FINDINGS: Normal cardiomediastinal silhouette. No acute airspace infiltrate. No pneumothorax or pleural effusion     No acute cardiopulmonary findings     FLUORO FOR SURGICAL PROCEDURES    Result Date: 11/1/2022  Radiology exam is complete. No Radiologist dictation. Please follow up with ordering provider.      XR HIP 2-3 VW W PELVIS RIGHT    Result Date: 10/31/2022  EXAMINATION: ONE XRAY VIEW OF THE PELVIS AND TWO XRAY VIEWS RIGHT HIP 10/31/2022 12:57 pm COMPARISON: None. HISTORY: ORDERING SYSTEM PROVIDED HISTORY: fall, injury TECHNOLOGIST PROVIDED HISTORY: Reason for exam:->fall, injury Reason for Exam: fall, injury FINDINGS: There is an angulated and displaced fracture through the right femoral neck. Angulated and displaced right femoral neck fracture       Patient Instructions: Follow-up visits: She will follow-up with her orthopedic doctor, Dr. Gali Herrera, and her family doctor after discharge. Discharge Medications:     Medication List        CHANGE how you take these medications      * aspirin 81 MG chewable tablet  Take 1 tablet by mouth in the morning and 1 tablet in the evening. Take twice a day for 30 days after hip surgery for DVT blood clot prophylaxis. Then can resume daily dosing. What changed: Another medication with the same name was added. Make sure you understand how and when to take each. * aspirin 81 MG chewable tablet  Take 1 tablet by mouth in the morning and 1 tablet in the evening. What changed: You were already taking a medication with the same name, and this prescription was added. Make sure you understand how and when to take each. methocarbamol 500 MG tablet  Commonly known as: ROBAXIN  Take 1 tablet by mouth 4 times daily for 10 days  What changed: when to take this     oxyCODONE 5 MG immediate release tablet  Commonly known as: ROXICODONE  Take 1 tablet by mouth every 6 hours as needed (pain level 1-5) for up to 14 days. What changed:   when to take this  reasons to take this           * This list has 2 medication(s) that are the same as other medications prescribed for you. Read the directions carefully, and ask your doctor or other care provider to review them with you.                 CONTINUE taking these medications      LORazepam 2 MG tablet  Commonly known as: ATIVAN     simvastatin 40 MG tablet  Commonly known as: ZOCOR               Where to Get Your Medications        You can get these medications from any pharmacy Bring a paper prescription for each of these medications  aspirin 81 MG chewable tablet  methocarbamol 500 MG tablet  oxyCODONE 5 MG immediate release tablet            I spent over 35 minutes on this discharge encounter between counseling, coordination of care, and medication reconciliation.          To comply with [facility] kaitlyn TORRESII.4.1:   Discharge order placed in advance to facilitate patients discharge needs      Ravindra Clancy MD, 11/9/2022, 11:36 AM

## 2022-11-09 NOTE — PROGRESS NOTES
Daughter at bedside to take pt home. Home care service provider picked out and Alberto Butler is aware. Meds to bed dropped off at 10:23 am. Pt now waiting on rolling walker to arrive. Will continue to monitor.

## 2022-11-09 NOTE — PROGRESS NOTES
CLINICAL PHARMACY NOTE: MEDS TO BEDS    Total # of Prescriptions Filled: 3   The following medications were delivered to the patient:  Current Discharge Medication List        START taking these medications    Details   !! aspirin 81 MG chewable tablet Take 1 tablet by mouth in the morning and 1 tablet in the evening. Qty: 60 tablet, Refills: 0       !! - Potential duplicate medications found. Please discuss with provider.       Methocarbamol 500mg   Oxycodone 5mg    Additional Documentation:

## 2022-11-11 ENCOUNTER — TELEPHONE (OUTPATIENT)
Dept: ORTHOPEDIC SURGERY | Age: 72
End: 2022-11-11

## 2022-11-11 NOTE — TELEPHONE ENCOUNTER
Spoke with Pt who asked me to please call back and leave the the name and number on her VM so she can call and get her mom set up for home health

## 2022-11-11 NOTE — TELEPHONE ENCOUNTER
I called back Kaden Murray from [facility] letting her know that I was able to speak with the daughter and gave her Kimi's name and contact number

## 2022-11-11 NOTE — TELEPHONE ENCOUNTER
[id number] [facility], [address] [place] [address], [phone]. CAN'T REACH PATIENT TO SCHEDULE HOMECARE. PLAYING PHONE TAG W/ DAUGHTER TO GET PATIENT SCHEDULED, MISSED TODAY'S APPT.

## 2022-11-14 ENCOUNTER — TELEPHONE (OUTPATIENT)
Dept: ORTHOPEDIC SURGERY | Age: 72
End: 2022-11-14

## 2022-11-14 NOTE — TELEPHONE ENCOUNTER
Mann Barroso from [facility] homecare reporting several attempts to schedule pt with daughter for therapy and still no answer. Many message left for daughter to call back and reschedule. Arvin Mack Pls advise on next step

## 2022-11-15 ENCOUNTER — TELEPHONE (OUTPATIENT)
Dept: ORTHOPEDIC SURGERY | Age: 72
End: 2022-11-15

## 2022-11-15 NOTE — TELEPHONE ENCOUNTER
Michelle Capone from [facility] [phone] states that patient and patient's daughter is not returning calls for home care.

## 2022-11-16 NOTE — TELEPHONE ENCOUNTER
IVETTE FROM [facility] CALLED STATES THAT PATIENT HAS CONTACTED THEM AND IS READY TO SCHEDULE. SHE IS NEEDING VERBAL ORDERS TO CONTINUE.  PLS CALL [phone]

## 2022-11-23 ENCOUNTER — TELEPHONE (OUTPATIENT)
Dept: ORTHOPEDIC SURGERY | Age: 72
End: 2022-11-23

## 2022-11-23 NOTE — TELEPHONE ENCOUNTER
Magen Sanchez from [facility] need extended verbal order for home care  Pls call [phone]

## 2022-11-23 NOTE — TELEPHONE ENCOUNTER
I spoke with Nyla Herman from [facility],    They are requesting a verbal for extended home care. Laddie Dakins stated that when they originally contacted Pt she denied home health care. Nyla Herman then told me that she spoke with the Daughter yesterday and the daughter told her that she went to her PO yesterday and now wants home health. I did inform Nyla Herman that the Pt did not come in for her PO yesterday 11/22/22, that the appointment had been canceled . Clare Din know that I will reach out Dr. Sherryle Greenland about this.      No verbal consent was given as I would to talk with Dr. Sherryle Greenland first.    Nyla Herman understood and I will reach out to her as soon as I hear back from Dr. Sherryle Greenland who is out of office until Monday

## 2023-08-31 NOTE — CARE COORDINATION
INITIAL CASE MANAGEMENT ASSESSMENT    Reviewed chart, met with patient to assess possible discharge needs. Explained Case Management role/services. Living Situation: lives at home with her daughter-son-in-law and grandchildren-- lives in her daughter's home     ADLs: independent     DME: none    PT/OT Recs:    Discharge Recommendations:  5-7 sessions per week, Patient would benefit from continued therapy after discharge     Brenna Garcia scored a 16/24 on the AM-PAC ADL Inpatient form. Current research shows that an AM-PAC score of 17 or less is typically not associated with a discharge to the patient's home setting. Based on the patient's AM-PAC score and their current ADL deficits, it is recommended that the patient have 5-7 sessions per week of Occupational Therapy at d/c to increase the patient's independence. At this time, this patient demonstrates complex nursing, medical, and rehabilitative needs, and would benefit from intensive rehabilitation services upon discharge from the Inpatient setting. This patient demonstrates the ability to participate in and benefit from an intensive therapy program    Active Services: none     Transportation: active      Medications: no issues    PCP: Bashir Allred      HD/PD: n/a    PLAN/COMMENTS: met with patient at bedside to discuss PT/OT recommendations for continued therapy-- patient prefers [facility]  ARU eval order noted--will follow       SW/CM provided contact information for patient or family to call with any questions. SW/CM will follow and assist as needed.     Electronically signed by Amanda Ya on 11/2/2022 at 11:22 AM  #[phone] None

## (undated) DEVICE — KIT POS FOAM HANA TBL

## (undated) DEVICE — SUTURE ABSORBABLE MONOFILAMENT 1-0 OS8 14 IN STRATAFIX SPRL SXPD2B202

## (undated) DEVICE — DUAL CUT SAGITTAL BLADE

## (undated) DEVICE — GLOVE ORTHO 8   MSG9480

## (undated) DEVICE — C-ARM: Brand: UNBRANDED

## (undated) DEVICE — SOLUTION IV 1000ML 0.9% SOD CHL FOR IRRIG PLAS CONT

## (undated) DEVICE — BASIC SINGLE BASIN 1-LF: Brand: MEDLINE INDUSTRIES, INC.

## (undated) DEVICE — GLOVE SURG SZ 8 L12IN FNGR THK79MIL GRN LTX FREE

## (undated) DEVICE — 3M™ MICROPORE™ TAPE, 1530-1: Brand: 3M™ MICROPORE™

## (undated) DEVICE — NEEDLE HYPO 18GA L1.5IN THN WALL PIVOTING SHLD BVL ORIENTED

## (undated) DEVICE — SYRINGE 60ML BULB IRRIG ST LF

## (undated) DEVICE — ELECTRODE BLDE L4IN NONINSULATED EDGE

## (undated) DEVICE — 1010 S-DRAPE TOWEL DRAPE 10/BX: Brand: STERI-DRAPE™

## (undated) DEVICE — SOLUTION PREP POVIDONE IOD FOR SKIN MUCOUS MEM PRIOR TO

## (undated) DEVICE — RETRACTOR SURG WIDE FLAT LO PROF LTWT PHOTONGUIDE

## (undated) DEVICE — SUTURE STRATAFIX SPRL SZ 2 0 L14IN ABSRB UD MH L36MM 1 2 CIR SXMP2B401

## (undated) DEVICE — TOTAL BASIC: Brand: MEDLINE INDUSTRIES, INC.

## (undated) DEVICE — 6619 2 PTNT ISO SYS INCISE AREA&LT;(&GT;&&LT;)&GT;P: Brand: STERI-DRAPE™ IOBAN™ 2

## (undated) DEVICE — BIPOLAR SEALER 23-112-1 AQM 6.0: Brand: AQUAMANTYS ®

## (undated) DEVICE — MAT FLR W32XL58IN

## (undated) DEVICE — SOLUTION IV IRRIG POUR BRL 0.9% SODIUM CHL 2F7124

## (undated) DEVICE — SYRINGE IRRIG 60ML SFT PLIABLE BLB EZ TO GRP 1 HND USE W/

## (undated) DEVICE — SUTURE STRATAFIX SPRL SZ 3-0 L12IN ABSRB UD FS-1 L30X30CM SXMP2B410

## (undated) DEVICE — GLOVE SURG SZ 85 L12IN FNGR ORTHO 126MIL CRM LTX FREE